# Patient Record
Sex: FEMALE | Race: WHITE | Employment: OTHER | URBAN - METROPOLITAN AREA
[De-identification: names, ages, dates, MRNs, and addresses within clinical notes are randomized per-mention and may not be internally consistent; named-entity substitution may affect disease eponyms.]

---

## 2018-03-07 RX ORDER — SCOLOPAMINE TRANSDERMAL SYSTEM 1 MG/1
1 PATCH, EXTENDED RELEASE TRANSDERMAL
COMMUNITY
End: 2021-03-15 | Stop reason: ALTCHOICE

## 2018-03-07 RX ORDER — CLINDAMYCIN AND BENZOYL PEROXIDE 10; 50 MG/G; MG/G
GEL TOPICAL NIGHTLY
COMMUNITY

## 2018-03-07 RX ORDER — GLIMEPIRIDE 4 MG/1
4 TABLET ORAL
COMMUNITY
End: 2022-03-07

## 2018-03-07 RX ORDER — SIMETHICONE 125 MG
1 CAPSULE ORAL 3 TIMES DAILY
COMMUNITY

## 2018-03-07 RX ORDER — TRETINOIN 1 MG/G
GEL TOPICAL NIGHTLY
COMMUNITY

## 2018-03-07 RX ORDER — M-VIT,TX,IRON,MINS/CALC/FOLIC 27MG-0.4MG
1 TABLET ORAL DAILY
COMMUNITY
End: 2020-12-02

## 2018-03-07 RX ORDER — ALPRAZOLAM 0.5 MG/1
0.5 TABLET ORAL EVERY 6 HOURS PRN
COMMUNITY
End: 2022-09-12

## 2018-03-07 RX ORDER — TACROLIMUS 0.5 MG/1
0.5 CAPSULE ORAL 2 TIMES DAILY
COMMUNITY

## 2018-03-07 RX ORDER — PRAVASTATIN SODIUM 10 MG
10 TABLET ORAL NIGHTLY
COMMUNITY

## 2018-03-07 RX ORDER — NYSTATIN 100000 U/G
OINTMENT TOPICAL 2 TIMES DAILY
COMMUNITY

## 2018-03-07 RX ORDER — DIPHENHYDRAMINE HCL 25 MG
25 CAPSULE ORAL NIGHTLY PRN
COMMUNITY

## 2018-03-07 RX ORDER — ESCITALOPRAM OXALATE 20 MG/1
20 TABLET ORAL DAILY
COMMUNITY

## 2018-03-07 RX ORDER — OMEPRAZOLE 20 MG/1
20 CAPSULE, DELAYED RELEASE ORAL DAILY
COMMUNITY

## 2018-03-07 RX ORDER — MYCOPHENOLATE MOFETIL 500 MG/1
1000 TABLET ORAL 2 TIMES DAILY
COMMUNITY
End: 2021-12-08

## 2018-03-07 RX ORDER — DOXYCYCLINE HYCLATE 100 MG
100 TABLET ORAL DAILY PRN
COMMUNITY

## 2018-03-07 RX ORDER — LISINOPRIL 5 MG/1
5 TABLET ORAL DAILY
COMMUNITY
End: 2022-09-12

## 2018-03-08 ENCOUNTER — HOSPITAL ENCOUNTER (OUTPATIENT)
Dept: RADIATION ONCOLOGY | Age: 63
Discharge: HOME | End: 2018-03-08
Payer: COMMERCIAL

## 2018-03-08 VITALS
HEART RATE: 100 BPM | DIASTOLIC BLOOD PRESSURE: 50 MMHG | BODY MASS INDEX: 31.5 KG/M2 | RESPIRATION RATE: 14 BRPM | WEIGHT: 220 LBS | HEIGHT: 70 IN | SYSTOLIC BLOOD PRESSURE: 119 MMHG | OXYGEN SATURATION: 95 % | TEMPERATURE: 96.6 F

## 2018-03-08 DIAGNOSIS — G47.33 OSA (OBSTRUCTIVE SLEEP APNEA): ICD-10-CM

## 2018-03-08 DIAGNOSIS — D05.12 DUCTAL CARCINOMA IN SITU (DCIS) OF LEFT BREAST: ICD-10-CM

## 2018-03-08 DIAGNOSIS — E11.9 TYPE 2 DIABETES MELLITUS WITHOUT COMPLICATION, UNSPECIFIED LONG TERM INSULIN USE STATUS: ICD-10-CM

## 2018-03-08 DIAGNOSIS — K21.9 GASTROESOPHAGEAL REFLUX DISEASE WITHOUT ESOPHAGITIS: ICD-10-CM

## 2018-03-08 DIAGNOSIS — E78.00 PURE HYPERCHOLESTEROLEMIA: ICD-10-CM

## 2018-03-08 DIAGNOSIS — I10 ESSENTIAL HYPERTENSION: ICD-10-CM

## 2018-03-08 DIAGNOSIS — Z94.1 H/O HEART TRANSPLANT (HCC): ICD-10-CM

## 2018-03-08 PROCEDURE — 99212 OFFICE O/P EST SF 10 MIN: CPT | Performed by: RADIOLOGY

## 2018-03-08 NOTE — H&P
RADIATION NEW PATIENT EVALUATION  DATE OF VISIT: 3/8/2018    DIAGNOSIS & STAGING: Low grad DCIS L breast 0.9 cm negative margins ER 90%      Dear Dr. Tatiana Alonso and Gucci Fowler,     CURRENT COMPLAINT: Here today for adjuvant radiation recommendations. HPI: I was asked by Dr. Tatiana Alonso to see this 58 y.o. postmenopausal female who has been followed closely with mammography +/- US every 6 months of the L breast for an abnormality thought to be probably benign since 2015. In November 2016 there was a 0.3 x 0.5 x 0.5 oval mass in the left breast seen and biopsy was recommended, but the patient declined. On 6/13/18 repeat mammography once again demonstrated likely benign findings and short term follow up was once again recommended, however on 11/28/18 the left breast mass was slightly larger (0.6cm) and biopsy was recommended. On 11/28/17 left breast biopsy at 12 o'clock 9 cm from the nipple demonstrated intraductal papilloma with focal ADH. Left breast lumpectomy on 1/20/18 at [facility] by Dr Gucci Fowler at [place] revealed a low grade solid and cribiform DCIS arising from intraductal papilloma with close margins. 0.9 cm ER 90%. The area underwent reexcision on 2/19/18 and only fat necrosis and fibrocystic changes with florid ductal hyperplasia was seen. The patient saw Dr Tatiana Alonso and discussed aromatase inhibitor therapy. Per Dr Catie Palma note, the patient researched the drug and declined therapy, however the patient states that Dr Tatiana Alonso did not recommend therapy as the relative benefit was small. She is here today to discuss adjuvant radiotherapy. She had a cardiac transplant in 2004 for dilated cardiomyopathy and mitral valve insufficiency. She is doing well from a cardiac standpoint, and has a catheterization scheduled in the near future. She complains of mild tenderness in the surgical bed.      PAST MEDICAL HISTORY:   Past Medical History:   Diagnosis Date    Depressive disorder     Ductal carcinoma in situ (DCIS) of left breast No cyanosis clubbing or lymphedema    MUSCULOSKELETAL: No bony tenderness along the hips ribs or spine. Motor strength is 5 out of 5 all extremities bilaterally. Tone is normal.    NEUROLOGIC:  Awake, alert, oriented x 3. Nonfocal    SKIN:  no bruising or bleeding, normal skin color, texture, turgor, no redness, warmth, or swelling, no rashes, no lesions, no abnormal moles and no jaundice      STUDIES: I have personally reviewed the imaging, laboratory, and pathology studies as previously described. IMPRESSION/PLAN:    0.9 cm low grade DCIS arising in papilloma. L breast, negative margins. ER 90%. History of cardiac transplant . [place]ed [facility] [place]:  Surgery alone 5y RR 8%, 10y RR 13%, with radiation 3%, 5%,  With AI  4%, 6%, AI and radiation 2% 2%. Recommend Oncotype DCIS score and further discussions regarding relative benefit of adjuvant therapy. Intent of treatment, potential risks benefits and side effects reviewed today. FOLLOW UP:  April 9 to discuss Oncotype DCIS results    ORDERS:  Through Dr Lillie Thornton office- Oncotype DCIS    Thank you for this consult and allowing us to participate in the care of this patient.   Sincerely,    Electronically signed by Charlotte Shelley MD on 3/8/18 at 2:43 PM      cc:   No att. providers found  MD Sandra Murillo MD  [facility], [address] [place] [address]

## 2018-04-09 ENCOUNTER — HOSPITAL ENCOUNTER (OUTPATIENT)
Dept: RADIATION ONCOLOGY | Age: 63
Discharge: HOME | End: 2018-04-09
Payer: COMMERCIAL

## 2018-11-12 ENCOUNTER — HOSPITAL ENCOUNTER (OUTPATIENT)
Dept: RADIATION ONCOLOGY | Age: 63
Discharge: HOME | End: 2018-11-12
Payer: COMMERCIAL

## 2018-11-12 VITALS
SYSTOLIC BLOOD PRESSURE: 142 MMHG | WEIGHT: 221.6 LBS | TEMPERATURE: 97.6 F | BODY MASS INDEX: 31.8 KG/M2 | RESPIRATION RATE: 16 BRPM | DIASTOLIC BLOOD PRESSURE: 97 MMHG | HEART RATE: 106 BPM

## 2018-11-12 DIAGNOSIS — D05.12 DUCTAL CARCINOMA IN SITU (DCIS) OF LEFT BREAST: Primary | ICD-10-CM

## 2018-11-12 PROCEDURE — 99214 OFFICE O/P EST MOD 30 MIN: CPT | Performed by: RADIOLOGY

## 2018-11-12 NOTE — ONCOLOGY
comparison. We will see her back at that time. Electronically signed by Juancho Davis MD on 11/12/18 at 4:58 PM      Thank you for allowing us to participate in the care of this patient.   cc:   No att. providers found  MD Lorena Gabriel MD  [address] [place] [address], [address] [place] [address]

## 2019-01-31 ENCOUNTER — HOSPITAL ENCOUNTER (OUTPATIENT)
Dept: PHYSICAL THERAPY | Age: 64
Setting detail: THERAPIES SERIES
Discharge: HOME | End: 2019-01-31
Payer: COMMERCIAL

## 2019-01-31 PROCEDURE — 97162 PT EVAL MOD COMPLEX 30 MIN: CPT

## 2019-01-31 ASSESSMENT — PAIN DESCRIPTION - LOCATION: LOCATION: KNEE

## 2019-01-31 ASSESSMENT — PAIN DESCRIPTION - FREQUENCY: FREQUENCY: INTERMITTENT

## 2019-01-31 ASSESSMENT — PAIN - FUNCTIONAL ASSESSMENT: PAIN_FUNCTIONAL_ASSESSMENT: PREVENTS OR INTERFERES WITH MANY ACTIVE NOT PASSIVE ACTIVITIES

## 2019-01-31 ASSESSMENT — PAIN DESCRIPTION - PAIN TYPE: TYPE: CHRONIC PAIN

## 2019-01-31 ASSESSMENT — PAIN DESCRIPTION - PROGRESSION: CLINICAL_PROGRESSION: GRADUALLY WORSENING

## 2019-01-31 ASSESSMENT — PAIN DESCRIPTION - DESCRIPTORS: DESCRIPTORS: DULL;PRESSURE

## 2019-01-31 ASSESSMENT — PAIN DESCRIPTION - ONSET: ONSET: PROGRESSIVE

## 2019-01-31 ASSESSMENT — PAIN DESCRIPTION - ORIENTATION: ORIENTATION: RIGHT;LEFT

## 2019-01-31 ASSESSMENT — PAIN SCALES - GENERAL: PAINLEVEL_OUTOF10: 2

## 2019-02-06 ENCOUNTER — HOSPITAL ENCOUNTER (OUTPATIENT)
Dept: PHYSICAL THERAPY | Age: 64
Setting detail: THERAPIES SERIES
Discharge: HOME | End: 2019-02-06
Payer: COMMERCIAL

## 2019-02-06 PROCEDURE — 97110 THERAPEUTIC EXERCISES: CPT

## 2019-02-06 ASSESSMENT — PAIN DESCRIPTION - LOCATION: LOCATION: KNEE

## 2019-02-06 ASSESSMENT — PAIN DESCRIPTION - PAIN TYPE: TYPE: CHRONIC PAIN

## 2019-02-06 ASSESSMENT — PAIN SCALES - GENERAL: PAINLEVEL_OUTOF10: 0

## 2019-02-06 ASSESSMENT — PAIN DESCRIPTION - PROGRESSION: CLINICAL_PROGRESSION: GRADUALLY WORSENING

## 2019-02-06 ASSESSMENT — PAIN DESCRIPTION - FREQUENCY: FREQUENCY: INTERMITTENT

## 2019-02-06 ASSESSMENT — PAIN DESCRIPTION - ORIENTATION: ORIENTATION: RIGHT;LEFT

## 2019-02-08 ENCOUNTER — HOSPITAL ENCOUNTER (OUTPATIENT)
Dept: PHYSICAL THERAPY | Age: 64
Setting detail: THERAPIES SERIES
Discharge: HOME | End: 2019-02-08
Payer: COMMERCIAL

## 2019-02-08 PROCEDURE — 97110 THERAPEUTIC EXERCISES: CPT

## 2019-02-08 ASSESSMENT — PAIN DESCRIPTION - FREQUENCY: FREQUENCY: INTERMITTENT

## 2019-02-08 ASSESSMENT — PAIN DESCRIPTION - ORIENTATION: ORIENTATION: RIGHT;LEFT

## 2019-02-08 ASSESSMENT — PAIN SCALES - GENERAL: PAINLEVEL_OUTOF10: 4

## 2019-02-08 ASSESSMENT — PAIN DESCRIPTION - LOCATION: LOCATION: KNEE

## 2019-02-08 ASSESSMENT — PAIN DESCRIPTION - DESCRIPTORS: DESCRIPTORS: DULL;PRESSURE

## 2019-02-08 ASSESSMENT — PAIN DESCRIPTION - PAIN TYPE: TYPE: CHRONIC PAIN

## 2019-02-08 ASSESSMENT — PAIN DESCRIPTION - PROGRESSION: CLINICAL_PROGRESSION: GRADUALLY WORSENING

## 2019-02-13 ENCOUNTER — HOSPITAL ENCOUNTER (OUTPATIENT)
Dept: PHYSICAL THERAPY | Age: 64
Setting detail: THERAPIES SERIES
Discharge: HOME | End: 2019-02-13
Payer: COMMERCIAL

## 2019-02-13 PROCEDURE — 97113 AQUATIC THERAPY/EXERCISES: CPT

## 2019-02-13 ASSESSMENT — PAIN DESCRIPTION - FREQUENCY: FREQUENCY: INTERMITTENT

## 2019-02-13 ASSESSMENT — PAIN DESCRIPTION - PROGRESSION: CLINICAL_PROGRESSION: GRADUALLY WORSENING

## 2019-02-13 ASSESSMENT — PAIN SCALES - GENERAL: PAINLEVEL_OUTOF10: 7

## 2019-02-13 ASSESSMENT — PAIN DESCRIPTION - LOCATION: LOCATION: KNEE

## 2019-02-13 ASSESSMENT — PAIN DESCRIPTION - DESCRIPTORS: DESCRIPTORS: ACHING

## 2019-02-13 ASSESSMENT — PAIN DESCRIPTION - ORIENTATION: ORIENTATION: LEFT;RIGHT

## 2019-02-15 ENCOUNTER — HOSPITAL ENCOUNTER (OUTPATIENT)
Dept: PHYSICAL THERAPY | Age: 64
Setting detail: THERAPIES SERIES
Discharge: HOME | End: 2019-02-15
Payer: COMMERCIAL

## 2019-02-20 ENCOUNTER — HOSPITAL ENCOUNTER (OUTPATIENT)
Dept: PHYSICAL THERAPY | Age: 64
Setting detail: THERAPIES SERIES
Discharge: HOME | End: 2019-02-20
Payer: COMMERCIAL

## 2019-02-22 ENCOUNTER — HOSPITAL ENCOUNTER (OUTPATIENT)
Dept: PHYSICAL THERAPY | Age: 64
Setting detail: THERAPIES SERIES
Discharge: HOME | End: 2019-02-22
Payer: COMMERCIAL

## 2019-02-22 PROCEDURE — 97110 THERAPEUTIC EXERCISES: CPT

## 2019-02-22 ASSESSMENT — PAIN SCALES - GENERAL: PAINLEVEL_OUTOF10: 6

## 2019-02-22 ASSESSMENT — PAIN DESCRIPTION - PAIN TYPE: TYPE: CHRONIC PAIN

## 2019-02-27 ENCOUNTER — HOSPITAL ENCOUNTER (OUTPATIENT)
Dept: PHYSICAL THERAPY | Age: 64
Setting detail: THERAPIES SERIES
Discharge: HOME | End: 2019-02-27
Payer: COMMERCIAL

## 2019-03-01 ENCOUNTER — HOSPITAL ENCOUNTER (OUTPATIENT)
Dept: PHYSICAL THERAPY | Age: 64
Setting detail: THERAPIES SERIES
Discharge: HOME | End: 2019-03-01
Payer: COMMERCIAL

## 2019-03-01 PROCEDURE — 97110 THERAPEUTIC EXERCISES: CPT

## 2019-03-01 ASSESSMENT — PAIN DESCRIPTION - PAIN TYPE: TYPE: CHRONIC PAIN

## 2019-03-01 ASSESSMENT — PAIN DESCRIPTION - FREQUENCY: FREQUENCY: INTERMITTENT

## 2019-03-01 ASSESSMENT — PAIN DESCRIPTION - ORIENTATION: ORIENTATION: LEFT;RIGHT

## 2019-03-01 ASSESSMENT — PAIN DESCRIPTION - DESCRIPTORS: DESCRIPTORS: ACHING

## 2019-03-01 ASSESSMENT — PAIN SCALES - GENERAL: PAINLEVEL_OUTOF10: 7

## 2019-03-01 ASSESSMENT — PAIN DESCRIPTION - LOCATION: LOCATION: OTHER (COMMENT)

## 2019-05-13 ENCOUNTER — HOSPITAL ENCOUNTER (OUTPATIENT)
Dept: ULTRASOUND IMAGING | Age: 64
Discharge: HOME | End: 2019-05-15
Payer: COMMERCIAL

## 2019-05-13 ENCOUNTER — HOSPITAL ENCOUNTER (OUTPATIENT)
Dept: WOMENS IMAGING | Age: 64
Discharge: HOME | End: 2019-05-15
Payer: COMMERCIAL

## 2019-05-13 DIAGNOSIS — Z85.3 HISTORY OF BREAST CANCER: ICD-10-CM

## 2019-05-13 PROCEDURE — G0279 TOMOSYNTHESIS, MAMMO: HCPCS

## 2019-05-13 PROCEDURE — 76642 ULTRASOUND BREAST LIMITED: CPT

## 2019-05-17 ENCOUNTER — HOSPITAL ENCOUNTER (OUTPATIENT)
Dept: RADIATION ONCOLOGY | Age: 64
Discharge: HOME | End: 2019-05-17
Payer: COMMERCIAL

## 2019-05-17 VITALS
SYSTOLIC BLOOD PRESSURE: 115 MMHG | WEIGHT: 222.2 LBS | DIASTOLIC BLOOD PRESSURE: 70 MMHG | HEIGHT: 70 IN | RESPIRATION RATE: 16 BRPM | TEMPERATURE: 96.6 F | HEART RATE: 96 BPM | BODY MASS INDEX: 31.81 KG/M2

## 2019-05-17 DIAGNOSIS — D05.12 DUCTAL CARCINOMA IN SITU (DCIS) OF LEFT BREAST: Primary | ICD-10-CM

## 2019-05-17 PROCEDURE — 99214 OFFICE O/P EST MOD 30 MIN: CPT | Performed by: RADIOLOGY

## 2019-05-17 RX ORDER — GABAPENTIN 300 MG/1
300 CAPSULE ORAL 2 TIMES DAILY
COMMUNITY
End: 2020-12-02

## 2019-05-17 NOTE — ONCOLOGY
History     Tobacco Use   Smoking Status Former Smoker    Packs/day: 10.00    Last attempt to quit: 1986    Years since quittin.3   Smokeless Tobacco Never Used     Social History     Substance and Sexual Activity   Alcohol Use No       ALLERGIES:   Allergies   Allergen Reactions    Iodides     Metformin      Other reaction(s): GI Upset    Metoclopramide      Other reaction(s): Intolerance  Teeth grinding    Nsaids      Other reaction(s): Other: See Comments  S/p heart transplant; decreased renal function    Procaine Hives        CURRENT MEDICATIONS:     Prior to Admission medications    Medication Sig Start Date End Date Taking? Authorizing Provider   gabapentin (NEURONTIN) 300 MG capsule Take 300 mg by mouth 2 times daily. Yes Historical Provider, MD   aspirin 81 MG tablet Take 81 mg by mouth daily   Yes Historical Provider, MD   ALPRAZolam (XANAX) 0.5 MG tablet Take 0.5 mg by mouth every 6 hours as needed for Sleep. Yes Historical Provider, MD   mycophenolate (CELLCEPT) 500 MG tablet Take 1,000 mg by mouth 2 times daily   Yes Historical Provider, MD   tacrolimus (PROGRAF) 0.5 MG capsule Take 0.5 mg by mouth 2 times daily   Yes Historical Provider, MD   insulin glargine (LANTUS SOLOSTAR) 100 UNIT/ML injection pen Inject 3 Units into the skin nightly   Yes Historical Provider, MD   omeprazole (PRILOSEC) 20 MG delayed release capsule Take 20 mg by mouth daily   Yes Historical Provider, MD   Multiple Vitamins-Minerals (OCUVITE-LUTEIN PO) Take 1 tablet by mouth daily   Yes Historical Provider, MD   glimepiride (AMARYL) 4 MG tablet Take 4 mg by mouth every morning (before breakfast)   Yes Historical Provider, MD   clindamycin-benzoyl peroxide (BENZACLIN) 1-5 % gel Apply topically nightly Apply topically 2 times daily.    Yes Historical Provider, MD   pravastatin (PRAVACHOL) 10 MG tablet Take 10 mg by mouth nightly   Yes Historical Provider, MD   lisinopril (PRINIVIL;ZESTRIL) 5 MG tablet Take 5 mg by mouth daily   Yes Historical Provider, MD   SITagliptin (JANUVIA) 100 MG tablet Take 100 mg by mouth daily   Yes Historical Provider, MD   escitalopram (LEXAPRO) 20 MG tablet Take 20 mg by mouth daily   Yes Historical Provider, MD   vitamin D (CHOLECALCIFEROL) 1000 UNIT TABS tablet Take 1,000 Units by mouth 2 times daily   Yes Historical Provider, MD   doxycycline hyclate (VIBRA-TABS) 100 MG tablet Take 100 mg by mouth daily    Historical Provider, MD   diphenhydrAMINE (BENADRYL) 25 MG capsule Take 25 mg by mouth nightly as needed for Itching (2-4 tabs at bedtime prn)    Historical Provider, MD   nystatin (MYCOSTATIN) 705609 UNIT/GM ointment Apply topically 2 times daily Apply topically 2 times daily. Historical Provider, MD   Magnesium 400 MG CAPS Take 1 tablet by mouth daily    Historical Provider, MD   tretinoin microspheres (RETIN-A MICRO) 0.1 % gel Apply topically nightly Apply topically nightly. Historical Provider, MD   scopolamine (TRANSDERM-SCOP, 1.5 MG,) transdermal patch Place 1 patch onto the skin every 72 hours    Historical Provider, MD   Multiple Vitamins-Minerals (THERAPEUTIC MULTIVITAMIN-MINERALS) tablet Take 1 tablet by mouth daily    Historical Provider, MD   Simethicone (GAS-X EXTRA STRENGTH) 125 MG CAPS Take 1 capsule by mouth 3 times daily    Historical Provider, MD     ECOG PERFORMANCE STATUS:    VITAL SIGNS:    Vitals:    05/17/19 0947   BP: 115/70   Pulse: 96   Resp: 16   Temp: 96.6 °F (35.9 °C)   Weight: 222 lb 3.2 oz (100.8 kg)   Height: 5' 10\" (1.778 m)     PHYSICAL EXAMINATION:  GENERAL: No acute distress. Alert, oriented, cooperative. Seen with  present. HEENT:  PERRLA, EOMI. Oral cavity WNL. NECK:  No palpable cervical or supraclavicular adenopathy. CHEST/LUNGS: CTA  CARDIOVASCULAR:  RRR, no audible murmur. Well-healed sternotomy scar from heart transplant. ABDOMEN:  Benign  EXTREMITIES: No C/C/E  BREASTS: Examined sitting and supine.   Good cosmetic outcome, with a solitary left upper inner quadrant scar. no palpable seroma. Breast nontender. Left nipple inverted which the patient states has always been the case. No palpable breast nodule, and in particular in the lower inner quadrant I could appreciate nothing suspicious. No axillary , infraclavicular, or supraclavicular adenopathy. Right breast entirely within normal limits. STUDIES: Mammogram L, & US. Compared with priors from [facility], now imported into [employer]. MILTON at site of DCIS. New 5mm nodule LIQ. Considered low risk, f/u 6m recommended. IMPRESSION/PLAN:  No evidence of recurrence at the lumpectomy site, with a new 5 mm density in the lower inner quadrant. Uncertain significance, considered low risk by radiology with a 6 month mammogram recommended. I ordered a bilateral mammogram to be done 6 months and I will see the patient back at that time. Electronically signed by Sameer Alvares MD on 5/17/19 at 11:22 AM      Thank you for allowing us to participate in the care of this patient.   cc:   No att. providers found  MD Magda Castañeda MD  [facility], [address] [place] [address]

## 2019-11-15 ENCOUNTER — HOSPITAL ENCOUNTER (OUTPATIENT)
Dept: WOMENS IMAGING | Age: 64
Discharge: HOME | End: 2019-11-17
Payer: COMMERCIAL

## 2019-11-15 DIAGNOSIS — Z85.3 HX: BREAST CANCER: ICD-10-CM

## 2019-11-15 PROCEDURE — G0279 TOMOSYNTHESIS, MAMMO: HCPCS

## 2019-11-20 ENCOUNTER — HOSPITAL ENCOUNTER (OUTPATIENT)
Dept: RADIATION ONCOLOGY | Age: 64
Discharge: HOME | End: 2019-11-20
Payer: COMMERCIAL

## 2019-11-20 VITALS
TEMPERATURE: 97.5 F | WEIGHT: 231 LBS | OXYGEN SATURATION: 98 % | DIASTOLIC BLOOD PRESSURE: 83 MMHG | BODY MASS INDEX: 33.15 KG/M2 | RESPIRATION RATE: 16 BRPM | SYSTOLIC BLOOD PRESSURE: 136 MMHG | HEART RATE: 89 BPM

## 2019-11-20 DIAGNOSIS — D05.12 DUCTAL CARCINOMA IN SITU (DCIS) OF LEFT BREAST: Primary | ICD-10-CM

## 2019-11-20 PROCEDURE — 99211 OFF/OP EST MAY X REQ PHY/QHP: CPT | Performed by: RADIOLOGY

## 2020-05-21 ENCOUNTER — HOSPITAL ENCOUNTER (OUTPATIENT)
Dept: WOMENS IMAGING | Age: 65
Discharge: HOME | End: 2020-05-23
Payer: COMMERCIAL

## 2020-05-21 PROCEDURE — G0279 TOMOSYNTHESIS, MAMMO: HCPCS

## 2020-05-22 ENCOUNTER — HOSPITAL ENCOUNTER (OUTPATIENT)
Dept: RADIATION ONCOLOGY | Age: 65
Discharge: HOME | End: 2020-05-22
Payer: COMMERCIAL

## 2020-05-22 VITALS
WEIGHT: 230.2 LBS | BODY MASS INDEX: 33.03 KG/M2 | SYSTOLIC BLOOD PRESSURE: 136 MMHG | HEART RATE: 89 BPM | DIASTOLIC BLOOD PRESSURE: 87 MMHG | RESPIRATION RATE: 16 BRPM | TEMPERATURE: 97.3 F

## 2020-05-22 PROCEDURE — 99212 OFFICE O/P EST SF 10 MIN: CPT | Performed by: RADIOLOGY

## 2020-05-22 NOTE — ONCOLOGY
NURSING ASSESSMENT     Date: 5/22/2020        Patient Name: Sofie Rojas     YOB: 1955      Age:  59 y.o. MRN: [id number]     Chaperone [] Yes   [x] No        Pain Score:   Pain Score (1-10): 8  Pain Location: lt breast  Near nipple  Pain Duration: comes and goes  Pain Management/Control:none      Is pain affecting your ability to take care of yourself or move throughout your home? [] Yes   [x] No    General: Fatigue  Patient has gained weight [] Yes   [x] No  Patient has lost weight [] Yes   [x] No  How much weight in pounds and over what length of time:     Eyes (Ophthalmic): No Problem- wearing glasses more     Skin (Dermatological): needs to see dermatologist- has itchy scapulas,rt arm and legs  Had rash but improved     ENT: No Problems     Respiratory: SOB and ALONZO     Cardiovascular: hx of transplant 2004. Some palpitations at time        Gastrointestinal: Nausea- every am-  This is new    Genito-Urinary: No Problems     Breast: Changes- lt ca. occ breast itches on lt  Pain in the lt breast  Has had a long time     Musculoskeletal: Joint Pain lower half of body    Neurological: Numbness and Tingling- feet. Hematological and Lymphatic: Prior Transfusion- during surgery     Endocrine: Diabetes Mellitus        Was the patient admitted during the course of treatment OR within 30 days of treatment?  NO      Additional Comments: Dr Nicol Cuello dismissed the pt

## 2020-05-22 NOTE — H&P
RADIATION FOLLOW UP NOTE  DATE OF VISIT: 5/22/2020    DIAGNOSIS & STAGING: Low grade DCIS L breast 0.9 cm negative margins ER 90%     Dear Evin Felix DO,     CURRENT COMPLAINT: Routine followup     INTERVAL HISTORY: Cody Branch is a  postmenopausal female who has been followed closely with mammography +/- US every 6 months of the L breast for an abnormality thought to be probably benign since 2015. In November 2016 there was a 0.3 x 0.5 x 0.5 oval mass in the left breast seen and biopsy was recommended, but the patient declined.  On 6/13/18 repeat mammography once again demonstrated likely benign findings and short term follow up was once again recommended, however on 11/28/18 the left breast mass was slightly larger (0.6cm) and biopsy was recommended. On 11/28/17 left breast biopsy at 12 o'clock 9 cm from the nipple demonstrated intraductal papilloma with focal ADH. Left breast lumpectomy on 1/20/18 at [facility] by Dr Ino Dougherty at [place] revealed a low grade solid and cribiform DCIS arising from intraductal papilloma with close margins. 0.9 cm ER 90%. The area underwent reexcision on 2/19/18 and only fat necrosis and fibrocystic changes with florid ductal hyperplasia was seen.      The patient saw Dr Erika Franco and discussed aromatase inhibitor therapy, and the patient states that Dr Erika Franco did not recommend therapy as the relative benefit was small. She no longer sees Dr Erika Franco. She was seen 3/8/18 to discuss adjuvant radiotherapy recommendations. She had a cardiac transplant in 2004 for dilated cardiomyopathy and mitral valve insufficiency. Oncotype DCIS score was ordered to help with radiation decision making. The score  returned as 0 (ER 10.7 MO >10) corresponding with 5% any breast recurrence risk (ICD and DCIS) and 4% IDC risk fiollowing surgery alone (without radiation). I discussed the report and the significance with the patient and her  and she decided for close followup and forego the radiation.      Since Spring 2018 she has been under close surveillence with self breast exams and q 6 month left mammography. She has transferred her mammograms to  [facility] and last bilateral mammogram was 11/15/19. COMPARISON: May 13, 2019, October 11, 2018, and November 28, 2017.       FINDINGS:3-D tomosynthesis imaging of the bilateral breasts was performed. Stable postop changes in the upper central left breast. Benign coarse calcification in the deep left breast. There are scattered fibroglandular densities within each breast.      There are no suspicious masses, areas of architectural distortion or suspicious areas of microcalcifications.         CAD analysis was performed and used in the interpretation.           Impression   BI-RADS 2: BENIGN FINDINGS.        Most recent mammogram 5/21/20:  EXAMINATION: BEE DIGITAL DIAGNOSTIC W OR WO CAD LEFT       CLINICAL HISTORY:D05.12 Ductal carcinoma in situ (DCIS) of left breast ICD10        COMPARISON: Priors dating back to 2017.       RESULT:       3-D tomosynthesis imaging of the left breast was performed.        There are scattered fibroglandular densities.          There are no suspicious masses or asymmetries, areas of architectural distortion or suspicious areas of microcalcifications. Posttreatment changes with areas of fat necrosis are unchanged. Stable asymmetries.         CAD analysis was performed and used in the interpretation.               Impression       BI-RADS 2: BENIGN FINDINGS.       ROUTINE FOLLOW-UP MAMMOGRAPHY IS SUGGESTED WHEN DUE FOR ANNUAL SCREENING.       DENSITY: Scattered        Board Certified Radiologists.  Accredited by the ACR and FDA. She is here for routine followup. She complains of persistent left breast tenderness to deep palpation.      PAST MEDICAL HISTORY:   Past Medical History:   Diagnosis Date    Depressive disorder     Ductal carcinoma in situ (DCIS) of left breast 01/30/2018    [id number]   2 cm low grade negative margins ER strongly pos.  s/p partial mastectomy    Esophageal reflux     H/O heart transplant ([facility] [address] 2004    for dilated cardiomyopathy     HPV (human papilloma virus) anogenital infection     Hyperlipidemia     Hypertension     Kidney stones     Lumbago     Multiple nevi     KARMEN (obstructive sleep apnea)     Osteoarthritis     [facility]        PAST SURGICAL HISTORY:  Past Surgical History:   Procedure Laterality Date    BREAST LUMPECTOMY      CATARACT REMOVAL Bilateral     CHOLECYSTECTOMY  2011    HEART TRANSPLANT  2004       ALLERGIES:   Allergies   Allergen Reactions    Iodides     Metformin      Other reaction(s): GI Upset    Metoclopramide      Other reaction(s): Intolerance  Teeth grinding    Nsaids      Other reaction(s): Other: See Comments  S/p heart transplant; decreased renal function    Procaine Hives        I have personally reviewed and reconciled the allergies listed. CURRENT MEDICATIONS:   Prior to Admission medications    Medication Sig Start Date End Date Taking? Authorizing Provider   insulin lispro (HUMALOG) 100 UNIT/ML injection vial Inject 200 Units into the skin 3 times daily (before meals) Indications: sliding scale   Yes Historical Provider, MD   gabapentin (NEURONTIN) 300 MG capsule Take 300 mg by mouth 2 times daily. Yes Historical Provider, MD   aspirin 81 MG tablet Take 81 mg by mouth daily   Yes Historical Provider, MD   ALPRAZolam (XANAX) 0.5 MG tablet Take 0.5 mg by mouth every 6 hours as needed for Sleep.    Yes Historical Provider, MD   mycophenolate (CELLCEPT) 500 MG tablet Take 1,000 mg by mouth 2 times daily   Yes Historical Provider, MD   tacrolimus (PROGRAF) 0.5 MG capsule Take 0.5 mg by mouth 2 times daily   Yes Historical Provider, MD   insulin glargine (LANTUS SOLOSTAR) 100 UNIT/ML injection pen Inject 3 Units into the skin nightly   Yes Historical Provider, MD   omeprazole (PRILOSEC) 20 MG delayed release capsule Take 20 mg by

## 2020-11-23 ENCOUNTER — HOSPITAL ENCOUNTER (OUTPATIENT)
Dept: WOMENS IMAGING | Age: 65
Discharge: HOME | End: 2020-11-25
Payer: MEDICARE

## 2020-11-23 ENCOUNTER — HOSPITAL ENCOUNTER (OUTPATIENT)
Dept: ULTRASOUND IMAGING | Age: 65
Discharge: HOME | End: 2020-11-25
Payer: MEDICARE

## 2020-11-23 PROCEDURE — G0279 TOMOSYNTHESIS, MAMMO: HCPCS

## 2020-11-23 PROCEDURE — 76642 ULTRASOUND BREAST LIMITED: CPT

## 2020-11-24 ENCOUNTER — TELEPHONE (OUTPATIENT)
Dept: SURGERY | Age: 65
End: 2020-11-24

## 2020-11-24 NOTE — TELEPHONE ENCOUNTER
I scheduled the patient for 12-2-20 at 1:15 PM with Dr. Reji Pyle. The patient has no questions at this time.

## 2020-12-02 ENCOUNTER — HOSPITAL ENCOUNTER (OUTPATIENT)
Dept: RADIATION ONCOLOGY | Age: 65
Discharge: HOME | End: 2020-12-02
Payer: MEDICARE

## 2020-12-02 ENCOUNTER — OFFICE VISIT (OUTPATIENT)
Dept: SURGERY | Age: 65
End: 2020-12-02
Payer: MEDICARE

## 2020-12-02 VITALS
SYSTOLIC BLOOD PRESSURE: 130 MMHG | TEMPERATURE: 97.7 F | RESPIRATION RATE: 18 BRPM | HEART RATE: 98 BPM | BODY MASS INDEX: 31.33 KG/M2 | DIASTOLIC BLOOD PRESSURE: 78 MMHG | WEIGHT: 223.8 LBS | HEIGHT: 71 IN

## 2020-12-02 VITALS
HEART RATE: 98 BPM | DIASTOLIC BLOOD PRESSURE: 78 MMHG | BODY MASS INDEX: 31.33 KG/M2 | RESPIRATION RATE: 18 BRPM | TEMPERATURE: 99.1 F | SYSTOLIC BLOOD PRESSURE: 130 MMHG | WEIGHT: 223.8 LBS | HEIGHT: 71 IN

## 2020-12-02 PROBLEM — D05.12 BREAST NEOPLASM, TIS (DCIS), LEFT: Status: ACTIVE | Noted: 2020-12-02

## 2020-12-02 PROBLEM — R92.8 ABNORMAL MAMMOGRAM OF LEFT BREAST: Status: ACTIVE | Noted: 2020-12-02

## 2020-12-02 PROCEDURE — 3017F COLORECTAL CA SCREEN DOC REV: CPT | Performed by: SURGERY

## 2020-12-02 PROCEDURE — 1123F ACP DISCUSS/DSCN MKR DOCD: CPT | Performed by: SURGERY

## 2020-12-02 PROCEDURE — G8417 CALC BMI ABV UP PARAM F/U: HCPCS | Performed by: SURGERY

## 2020-12-02 PROCEDURE — G8427 DOCREV CUR MEDS BY ELIG CLIN: HCPCS | Performed by: SURGERY

## 2020-12-02 PROCEDURE — 1036F TOBACCO NON-USER: CPT | Performed by: SURGERY

## 2020-12-02 PROCEDURE — 1090F PRES/ABSN URINE INCON ASSESS: CPT | Performed by: SURGERY

## 2020-12-02 PROCEDURE — 4040F PNEUMOC VAC/ADMIN/RCVD: CPT | Performed by: SURGERY

## 2020-12-02 PROCEDURE — G8400 PT W/DXA NO RESULTS DOC: HCPCS | Performed by: SURGERY

## 2020-12-02 PROCEDURE — 99204 OFFICE O/P NEW MOD 45 MIN: CPT | Performed by: SURGERY

## 2020-12-02 PROCEDURE — G8484 FLU IMMUNIZE NO ADMIN: HCPCS | Performed by: SURGERY

## 2020-12-02 RX ORDER — TRIAMCINOLONE ACETONIDE 1 MG/G
1 CREAM TOPICAL DAILY
COMMUNITY
Start: 2020-10-23 | End: 2021-12-08

## 2020-12-02 RX ORDER — BETAMETHASONE DIPROPIONATE 0.5 MG/G
1 CREAM TOPICAL 2 TIMES DAILY
COMMUNITY
Start: 2020-12-01

## 2020-12-02 ASSESSMENT — ENCOUNTER SYMPTOMS
SHORTNESS OF BREATH: 0
COUGH: 0
COLOR CHANGE: 0
ABDOMINAL PAIN: 0
VOMITING: 0
CHEST TIGHTNESS: 0
NAUSEA: 0
SORE THROAT: 0

## 2020-12-02 NOTE — PROGRESS NOTES
NEW BREAST PATIENT         SERVICE DATE: 12/2/20  SERVICE TIME:  1:28 PM EST    REFERRED BY:  Dr. Caty Vickers, Carina Houston DO  REASON FOR TODAY'S VISIT:    Chief Complaint   Patient presents with    New Patient     HX of Left Breast cancer 2018. Left Lumpectomy 2-9-2018 followed by radiation, declined endocrine therapy    Abnormal Mammogram     Left Breast BIRADS 4. Patient states the left breast is more painful than it was before      CHAPERONE WAS OFFERED, PATIENT RESPONDED: no    HISTORY AND CHIEF COMPLAINT:  Soy Zhong is a 72 y.o.  female who is here for a New Patient (HX of Left Breast cancer 2018. Left Lumpectomy 2-9-2018 followed by radiation, declined endocrine therapy) and Abnormal Mammogram (Left Breast BIRADS 4. Patient states the left breast is more painful than it was before)    Cancer Staging  Breast neoplasm, Tis (DCIS), left  Staging form: Breast, AJCC 8th Edition  - Clinical stage from 3/5/2018: Stage 0 (cTis (DCIS), cN0, cM0, ER+) - Signed by Yasmine Temple MD on 12/2/2020         BREAST HISTORY  Her past breast history (prior to this encounter) is as follows: Abnormal mammogram:   Yes, Left BIRADS 4  Abnormal Breast US:  Yes, Left Breast  Breast biopsy:    Yes, 2018 Left Breast  Breast cysts:    No  Breast surgery:    Yes, Left Breast Lumpectomy 2018 followed by reexcision of margins for DCIS on 2-9-2018,   Breast cancer              Yes, Left Breast DCIS, Oncotype DX score 0  History of Breastfeeding: No   Currently Breastfeeding: No      RISK FACTORS FOR BREAST CANCER:  Family History of Breast Cancer: Yes, significant for Paternal Grandmother age 80.   History of ovarian cancer: no  Ashkenazi Ancestry: no  Age at the birth of first child: n/a  Age at the onset of menses: 6  Age at menopause: 52's   Hormonal therapy: no  Postmenopausal obesity: yes, BMI 31.66    BRA SIZE: 42DDD    Past Medical History:   Diagnosis Date    Depressive disorder     Ductal carcinoma in situ (DCIS) of left breast 2018    pTispNxpM   2 cm low grade negative margins ER strongly pos.  s/p partial mastectomy    Esophageal reflux     H/O heart transplant ([facility] [address]     for dilated cardiomyopathy     HPV (human papilloma virus) anogenital infection     Hyperlipidemia     Hypertension     Kidney stones     Lumbago     Multiple nevi     KARMEN (obstructive sleep apnea)     Osteoarthritis     Type 2 diabetes mellitus without complication (HCC)      Past Surgical History:   Procedure Laterality Date    BREAST LUMPECTOMY      CATARACT REMOVAL Bilateral     CHOLECYSTECTOMY  2011    HEART TRANSPLANT       Family History   Problem Relation Age of Onset    Breast Cancer Paternal Grandmother     Cancer Paternal Grandmother      Social History     Socioeconomic History    Marital status:      Spouse name: Not on file    Number of children: Not on file    Years of education: Not on file    Highest education level: Not on file   Occupational History    Not on file   Social Needs    Financial resource strain: Not on file    Food insecurity     Worry: Not on file     Inability: Not on file    Transportation needs     Medical: Not on file     Non-medical: Not on file   Tobacco Use    Smoking status: Former Smoker     Packs/day: 10.00     Last attempt to quit: 1986     Years since quittin.9    Smokeless tobacco: Never Used   Substance and Sexual Activity    Alcohol use: No     Comment: occ    Drug use: Never    Sexual activity: Yes     Partners: Male   Lifestyle    Physical activity     Days per week: Not on file     Minutes per session: Not on file    Stress: Not on file   Relationships    Social connections     Talks on phone: Not on file     Gets together: Not on file     Attends Sikhism service: Not on file     Active member of club or organization: Not on file     Attends meetings of clubs or organizations: Not on file     Relationship status: Not on file   Dejon Stokes Intimate partner violence     Fear of current or ex partner: Not on file     Emotionally abused: Not on file     Physically abused: Not on file     Forced sexual activity: Not on file   Other Topics Concern    Not on file   Social History Narrative    Not on file       Review of Systems   Constitutional: Negative for activity change, appetite change, chills, diaphoresis, fatigue, fever and unexpected weight change. HENT: Negative for congestion, ear pain, hearing loss, mouth sores, nosebleeds and sore throat. Respiratory: Negative for cough, chest tightness and shortness of breath. Cardiovascular: Negative for chest pain, palpitations and leg swelling. Gastrointestinal: Negative for abdominal pain, nausea and vomiting. Endocrine: Negative for cold intolerance, heat intolerance, polydipsia, polyphagia and polyuria. Genitourinary: Negative for difficulty urinating, menstrual problem and vaginal bleeding. Musculoskeletal: Negative for neck pain and neck stiffness. Skin: Negative for color change, pallor, rash and wound. Allergic/Immunologic: Negative for environmental allergies and immunocompromised state. Neurological: Negative for weakness. Hematological: Does not bruise/bleed easily. Psychiatric/Behavioral: Negative for agitation, confusion, sleep disturbance and suicidal ideas. The patient is not nervous/anxious. Have you ever tested positive for AIDS? no  Have you ever tested positive for Hepatitis? no    ANTICOAGULANT MEDICATIONS:  ASA    SOCIAL HISTORY   Marital status:   Occupation:  Retired  Tobacco use: Mac Kelley  reports that she quit smoking about 34 years ago. She smoked 10.00 packs per day. She has never used smokeless tobacco.  Alcohol use: Mac Kelley  reports no history of alcohol use. Drug use: Mac Kelley  reports no history of drug use.   Caffeine intake: 2 cups of caffeinated coffee per day(s)  Exercise:  not active    /78   Pulse 98   Temp 99.1 °F (37.3 °C) Resp 18   Ht 5' 10.5\" (1.791 m)   Wt 223 lb 12.8 oz (101.5 kg)   BMI 31.66 kg/m²     Physical Exam  Vitals signs reviewed. Constitutional:       Appearance: Normal appearance. She is well-developed. HENT:      Head: Normocephalic and atraumatic. Nose: Nose normal.   Eyes:      Conjunctiva/sclera: Conjunctivae normal.      Right eye: No hemorrhage. Left eye: No hemorrhage. Neck:      Musculoskeletal: Normal range of motion and neck supple. Cardiovascular:      Rate and Rhythm: Normal rate. Pulmonary:      Effort: Pulmonary effort is normal. No respiratory distress. Chest:      Breasts: Breasts are symmetrical.         Right: No inverted nipple, mass, nipple discharge, skin change or tenderness. Left: No inverted nipple, mass, nipple discharge, skin change or tenderness. Abdominal:      Tenderness: There is no abdominal tenderness. Musculoskeletal: Normal range of motion. Comments: Normal Range of motion in upper and lower extremities. Lymphadenopathy:      Cervical: No cervical adenopathy. Right cervical: No superficial, deep or posterior cervical adenopathy. Left cervical: No superficial, deep or posterior cervical adenopathy. Upper Body:      Right upper body: No supraclavicular, axillary or pectoral adenopathy. Left upper body: No supraclavicular, axillary or pectoral adenopathy. Skin:     General: Skin is warm and dry. Findings: No abrasion, bruising, erythema or lesion. Neurological:      Mental Status: She is alert and oriented to person, place, and time. She is not disoriented. Psychiatric:         Speech: Speech normal.         Behavior: Behavior normal. Behavior is cooperative. Thought Content:  Thought content normal.         Judgment: Judgment normal.     RADIOGRAPHIC FINDINGS:    [facility] Digital Diagnostic W Or Wo Cad Bilateral    Result Date: 11/23/2020  [facility] DIGITAL DIAGNOSTIC W OR WO CAD BILATERAL, US BREAST LIMITED LEFT:11/23/2020 CLINICAL HISTORY:D05.12 Ductal carcinoma in situ (DCIS) of left breast ICD10. COMPARISONS:   11/15/2019, 10/11/2018, 11/28/2017. TECHNIQUE:  Full field routine digital mammograms were obtained bilaterally. 3D breast tomosynthesis was also performed. Directed ultrasound of the left breast was performed, with a regional survey. CAD analysis was performed and used in the interpretation. FINDINGS-    An approximately 1 cm partially obscured irregular density within the upper-outer quadrant of the left breast at a posterior depth is best visualized on the 3-D breast tomosynthesis images. Directed ultrasound demonstrates an approximately 6 x 5 mm irregular hypoechoic nodule at the 1:00 position approximately 7 cm from the nipple, which may correspond to the mammographic area of concern. However, biopsy using 3D breast tomosynthesis guidance is suggested. Scattered fibroglandular densities are noted with otherwise stable asymmetry and postoperative changes on the left. BI-RADS 4: SUSPICIOUS FINDING--BIOPSY SHOULD BE CONSIDERED. A 3D STEREOTACTIC LEFT BREAST BIOPSY IS SUGGESTED. DENSITY: Scattered fibroglandular densities. Board Certified Radiologists. Accredited by the ACR and FDA. MAMMOGRAPHY IS VERY IMPORTANT TO YOUR HEALTH. THE AMERICAN CANCER SOCIETY GUIDELINES RECOMMEND THAT WOMEN 36YEARS OF AGE AND OLDER SHOULD HAVE A MAMMOGRAM EVERY YEAR. A REMINDER LETTER WILL BE SENT AT THE APPROPRIATE TIME. Us Breast Limited Left    Result Date: 11/23/2020  [facility] DIGITAL DIAGNOSTIC W OR WO CAD BILATERAL, US BREAST LIMITED LEFT:11/23/2020 CLINICAL HISTORY:D05.12 Ductal carcinoma in situ (DCIS) of left breast ICD10. COMPARISONS:   11/15/2019, 10/11/2018, 11/28/2017. TECHNIQUE:  Full field routine digital mammograms were obtained bilaterally. 3D breast tomosynthesis was also performed. Directed ultrasound of the left breast was performed, with a regional survey.  CAD analysis was performed and used in the interpretation. FINDINGS-    An approximately 1 cm partially obscured irregular density within the upper-outer quadrant of the left breast at a posterior depth is best visualized on the 3-D breast tomosynthesis images. Directed ultrasound demonstrates an approximately 6 x 5 mm irregular hypoechoic nodule at the 1:00 position approximately 7 cm from the nipple, which may correspond to the mammographic area of concern. However, biopsy using 3D breast tomosynthesis guidance is suggested. Scattered fibroglandular densities are noted with otherwise stable asymmetry and postoperative changes on the left. BI-RADS 4: SUSPICIOUS FINDING--BIOPSY SHOULD BE CONSIDERED. A 3D STEREOTACTIC LEFT BREAST BIOPSY IS SUGGESTED. DENSITY: Scattered fibroglandular densities. Board Certified Radiologists. Accredited by the ACR and FDA. MAMMOGRAPHY IS VERY IMPORTANT TO YOUR HEALTH. THE AMERICAN CANCER SOCIETY GUIDELINES RECOMMEND THAT WOMEN 36YEARS OF AGE AND OLDER SHOULD HAVE A MAMMOGRAM EVERY YEAR. A REMINDER LETTER WILL BE SENT AT THE APPROPRIATE TIME. ASSESSMENT    IMPRESSION :      ICD-10-CM    1. Abnormal mammogram of left breast  R92.8    2. Breast neoplasm, Tis (DCIS), left  D05.12         PLAN:    F/u cardiologist regarding premedication for breast biopsy  I recommended a stereotactic core needle biopsy. I reviewed the procedure in great detail. I reviewed the risks and benefits of the procedure including bleeding and infection. I did inform the patient that if the procedure was not amendable to stereotactic biopsy, we will need to schedule an excisional biopsy. We will schedule the procedure. The patient had the opportunity to ask several questions and all were answered to their satisfaction. Total face to face time was 45 minutes with greater than 50% spent on counseling the patient or coordinating her care.      Cecilia Rogers MD    CC: Summer Jack,     The chief complaint, extensive medical and family history, and review of systems were asked and reviewed with the patient by me. I also reviewed the note in detail. This note was partially generated using Dragon voice recognition system, and there may be some incorrect words, spellings, punctuation that were not noticed in checking the note before saving.

## 2020-12-09 ENCOUNTER — HOSPITAL ENCOUNTER (OUTPATIENT)
Dept: WOMENS IMAGING | Age: 65
Discharge: HOME | End: 2020-12-11
Payer: MEDICARE

## 2020-12-09 VITALS — RESPIRATION RATE: 20 BRPM | SYSTOLIC BLOOD PRESSURE: 164 MMHG | DIASTOLIC BLOOD PRESSURE: 77 MMHG | HEART RATE: 109 BPM

## 2020-12-09 PROCEDURE — 88342 IMHCHEM/IMCYTCHM 1ST ANTB: CPT

## 2020-12-09 PROCEDURE — 2500000003 HC RX 250 WO HCPCS: Performed by: RADIOLOGY

## 2020-12-09 PROCEDURE — 2709999900 MAM STEREO BREAST BX W LOC DEVICE 1ST LESION LEFT

## 2020-12-09 PROCEDURE — 88305 TISSUE EXAM BY PATHOLOGIST: CPT

## 2020-12-09 PROCEDURE — 88341 IMHCHEM/IMCYTCHM EA ADD ANTB: CPT

## 2020-12-09 PROCEDURE — 2580000003 HC RX 258: Performed by: RADIOLOGY

## 2020-12-09 RX ORDER — LIDOCAINE HYDROCHLORIDE 20 MG/ML
20 INJECTION, SOLUTION INFILTRATION; PERINEURAL ONCE
Status: COMPLETED | OUTPATIENT
Start: 2020-12-09 | End: 2020-12-09

## 2020-12-09 RX ORDER — MAGNESIUM HYDROXIDE 1200 MG/15ML
250 LIQUID ORAL CONTINUOUS
Status: DISCONTINUED | OUTPATIENT
Start: 2020-12-09 | End: 2020-12-12 | Stop reason: HOSPADM

## 2020-12-09 RX ORDER — LIDOCAINE HYDROCHLORIDE AND EPINEPHRINE 10; 10 MG/ML; UG/ML
20 INJECTION, SOLUTION INFILTRATION; PERINEURAL ONCE
Status: COMPLETED | OUTPATIENT
Start: 2020-12-09 | End: 2020-12-09

## 2020-12-09 RX ADMIN — LIDOCAINE HYDROCHLORIDE AND EPINEPHRINE 20 ML: 10; 10 INJECTION, SOLUTION INFILTRATION; PERINEURAL at 11:30

## 2020-12-09 RX ADMIN — SODIUM CHLORIDE 250 ML: 900 IRRIGANT IRRIGATION at 11:30

## 2020-12-09 RX ADMIN — LIDOCAINE HYDROCHLORIDE 20 ML: 20 INJECTION, SOLUTION INFILTRATION; PERINEURAL at 11:30

## 2020-12-09 ASSESSMENT — PAIN SCALES - GENERAL: PAINLEVEL_OUTOF10: 1

## 2020-12-09 NOTE — SEDATION DOCUMENTATION
10:41 Patient arrived to [facility] with steady gait. 10:54 Reviewed procedure with patient and patient verbalizes understanding. VS taken. Consent signed. Reviewed history, medications, and allergies. 11:00 Patient assisted into mamm room and into stereo chair. Tech taking films. 11:05  Dr. Murl Cogan talking with patient and looking at films. 11:30  Area of concern located. Area cleaned with chloraprep x 3. Eviva KM16A lot [id number] exp 05- used during procedure. Dr. Murl Cogan then injected lido 2% into left breast to numb area. Then he took samples of tissue out using Eviva 0913-20  Lot [id number] exp 09-. Patient tolerated fair. Tissue out at 11:30 and into formalin. He then inserted Venda Eviva 13 lot [id number] exp 02-. Compression held to site. Steri strips, gauze, and tegaderm applied. Wrapped chest with ace wrap and inserted ice pack to bx site. Assisted patient out of mamm chair and into consultation room. Steady gait. !2:06 Discharge instructions reviewed with patient and patient verbalizes understanding. VS taken. Dressing clean, dry, and intact. Denies pain at present. Encouraged patient to call with any questions or concerns. Patient left [facility] with steady gait.

## 2020-12-14 ENCOUNTER — TELEPHONE (OUTPATIENT)
Dept: SURGERY | Age: 65
End: 2020-12-14

## 2020-12-14 ENCOUNTER — OFFICE VISIT (OUTPATIENT)
Dept: SURGERY | Age: 65
End: 2020-12-14
Payer: MEDICARE

## 2020-12-14 VITALS
WEIGHT: 224.2 LBS | SYSTOLIC BLOOD PRESSURE: 125 MMHG | HEIGHT: 71 IN | DIASTOLIC BLOOD PRESSURE: 81 MMHG | BODY MASS INDEX: 31.39 KG/M2 | HEART RATE: 101 BPM

## 2020-12-14 PROCEDURE — G8417 CALC BMI ABV UP PARAM F/U: HCPCS | Performed by: SURGERY

## 2020-12-14 PROCEDURE — 3017F COLORECTAL CA SCREEN DOC REV: CPT | Performed by: SURGERY

## 2020-12-14 PROCEDURE — G8427 DOCREV CUR MEDS BY ELIG CLIN: HCPCS | Performed by: SURGERY

## 2020-12-14 PROCEDURE — 99215 OFFICE O/P EST HI 40 MIN: CPT | Performed by: SURGERY

## 2020-12-14 PROCEDURE — 1123F ACP DISCUSS/DSCN MKR DOCD: CPT | Performed by: SURGERY

## 2020-12-14 PROCEDURE — G8400 PT W/DXA NO RESULTS DOC: HCPCS | Performed by: SURGERY

## 2020-12-14 PROCEDURE — G8484 FLU IMMUNIZE NO ADMIN: HCPCS | Performed by: SURGERY

## 2020-12-14 PROCEDURE — 1090F PRES/ABSN URINE INCON ASSESS: CPT | Performed by: SURGERY

## 2020-12-14 PROCEDURE — 1036F TOBACCO NON-USER: CPT | Performed by: SURGERY

## 2020-12-14 PROCEDURE — 4040F PNEUMOC VAC/ADMIN/RCVD: CPT | Performed by: SURGERY

## 2020-12-14 NOTE — TELEPHONE ENCOUNTER
Attempted to contact patient, unable to leave message phone rings continuously. Patient needs to be made aware of previous message. Refill on : lisdexamfetamine (VYVANSE) 40 MG capsule  [facility]  Call placed on refill line at 8:35am  Mom would like a call when RX is called in. OK to leave message on her phone.

## 2020-12-15 PROBLEM — Z17.0 CARCINOMA OF UPPER-OUTER QUADRANT OF LEFT BREAST IN FEMALE, ESTROGEN RECEPTOR POSITIVE (HCC): Status: ACTIVE | Noted: 2020-12-15

## 2020-12-15 PROBLEM — C50.412 CARCINOMA OF UPPER-OUTER QUADRANT OF LEFT BREAST IN FEMALE, ESTROGEN RECEPTOR POSITIVE (HCC): Status: ACTIVE | Noted: 2020-12-15

## 2020-12-15 NOTE — PROGRESS NOTES
CANCER TALK    PATIENT:  Kisha Simon    DATE:     12/15/20    SURGICAL DISCUSSION:    Parvin Simpson is a 72y.o. year old  female who presents for a discussion left breast cancer. She did not receive radiation or hormonal therapy after her DCIS diagnosis in 2018. I believe this is a recurrence. We underwent a description of the pathology of her tumor, the clinical stage, her treatment options of breast conservation versus simple mastectomy and sentinel lymph node biopsy. These were all discussed with her. The patient is aware that by having a sentinel lymph node if the sentinel lymph node is negative on frozen section and the final pathology is positive she will need to return to the Operating Room. She is also aware there is a 5% chance that the sentinel lymph node may not be identified at surgery and that she may need to have a completion axillary dissection. There is a 3-5% chance of a false/negative finding on sentinel lymph node biopsy. The indications for chemotherapy and radiation therapy were also discussed. The patient has undergone explanation of the complications of the procedures which are including but not limited to bleeding, infection, nerve injury and lymphedema. The variation in lymphedema rates between sentinel lymph node biopsy and the completion axillary dissection were discussed. The patient is aware that if she meets all the criteria of having clear margins that the survival and local recurrence rate for mastectomy and breast conservation are the same. The potential risk of local recurrence is 5-6%. She is aware that if her margins return positive on the lumpectomy specimen that she would need to have either a re-excision for margins or a completion mastectomy. Multiple questions were answered and the patient wanted to schedule a wire localized lumpectomy and sentinel lymph node biopsy at the next available time. She will need cardiac clearance due to her transplant. Greater than 50% of the time spent was reviewing management options with the patient. Approximately 50 minutes was spent with the patient and family. IMPRESSION:    Cancer Staging  Breast neoplasm, Tis (DCIS), left  Staging form: Breast, AJCC 8th Edition  - Clinical stage from 3/5/2018: Stage 0 (cTis (DCIS), cN0, cM0, ER+) - Signed by Ciara Villavicencio MD on 12/2/2020    Carcinoma of upper-outer quadrant of left breast in female, estrogen receptor positive ([facility] [address]  Staging form: Breast, AJCC 8th Edition  - Clinical stage from 12/14/2020: rcT1, cN0, cM0, G2, ER+, WA+ - Signed by Ciara Villavicencio MD on 12/15/2020     Consultations to    Plastic Surgery Not Applicable  Radiation/Oncology Yes   Medical/Oncology  Yes  Genetic Counseling Yes, But Patient declined  Fertility issues  Not Applicable    Yes, But Patient declined  Psychology Yes, But Patient declined    Dictated by:  Merrill Ennis MD 12/15/20      Cc: Teofilo Delgado, DO Dr. Beatrice Garcia    This note was partially generated using Dragon voice recognition system, and there may be some incorrect words, spellings, punctuation that were not noticed in checking the note before saving.

## 2020-12-29 ENCOUNTER — HOSPITAL ENCOUNTER (OUTPATIENT)
Dept: OCCUPATIONAL THERAPY | Age: 65
Setting detail: THERAPIES SERIES
Discharge: HOME | End: 2020-12-29
Payer: MEDICARE

## 2020-12-29 ENCOUNTER — NURSE ONLY (OUTPATIENT)
Dept: SURGERY | Age: 65
End: 2020-12-29
Payer: MEDICARE

## 2020-12-29 VITALS
DIASTOLIC BLOOD PRESSURE: 86 MMHG | BODY MASS INDEX: 31.22 KG/M2 | RESPIRATION RATE: 20 BRPM | HEIGHT: 71 IN | SYSTOLIC BLOOD PRESSURE: 147 MMHG | HEART RATE: 96 BPM | WEIGHT: 223 LBS

## 2020-12-29 PROCEDURE — 97166 OT EVAL MOD COMPLEX 45 MIN: CPT

## 2020-12-29 RX ORDER — LIDOCAINE AND PRILOCAINE 25; 25 MG/G; MG/G
CREAM TOPICAL
Qty: 1 TUBE | Refills: 0 | Status: SHIPPED | OUTPATIENT
Start: 2020-12-29 | End: 2021-08-30

## 2020-12-29 NOTE — PROGRESS NOTES
Occupational Therapy Lymphedema Prehab Screen    Date: 2020  Patient Name: Jovanna Cowan        MRN: [id number]  Account: [de-identified]   : 1955  (72 y.o.)    Chart Review:  Diagnosis:  There were no encounter diagnoses. Past Medical History:   Diagnosis Date    Depressive disorder     Ductal carcinoma in situ (DCIS) of left breast 2018    pTispNxpM   2 cm low grade negative margins ER strongly pos.  s/p partial mastectomy    Esophageal reflux     H/O heart transplant ([facility] [place] [address]     for dilated cardiomyopathy     HPV (human papilloma virus) anogenital infection     Hyperlipidemia     Hypertension     Kidney stones     Lumbago     Multiple nevi     KARMEN (obstructive sleep apnea)     Osteoarthritis     Type 2 diabetes mellitus without complication ([facility] [place] [address]      Past Surgical History:   Procedure Laterality Date    BREAST LUMPECTOMY      CATARACT REMOVAL Bilateral     CHOLECYSTECTOMY      HEART TRANSPLANT      BEE STEROTACTIC LOC BREAST BIOPSY LEFT  2020    BEE STEROTACTIC LOC BREAST BIOPSY LEFT 2020 [facility]       Strength:   WFL BUEs    ROM:    WNL BUEs    Observation:   Good skin integrity BUEs. Circumferential Measurements    Location Rt UE (cm)   Date:  Lt UE (cm)   Date:    3rd DIP/ PIP 4.8/6 4.5/6   10 cm from distal 3rd finger 21.2 21.7   15 cm 23.1 22.3   20 cm 17.6 17.9   30 cm 22.3 23.3   40 cm 29 28.6   50 cm 31 30   60 cm 37.5 36.7                                                     Instructed pt. in lymphedema signs and symptoms and provided written hand out. Yes    Instructed pt. in ROM HEP for prevention and reduction of lymphedema symptoms. Yes    Pt. and  demonstrate a good understanding of provided information. Plan:  Follow up with physician per physician orders. Therapy Time:   OT Individual Minutes  Time In: 1310  Time Out: 2188  Minutes: 39    Electronically signed by:     MILO Rider  2020, 1:51 PM

## 2020-12-29 NOTE — PROGRESS NOTES
Patient teaching completed for their scheduled Left Breast Needle LOC Lumpectomy and SLNB. I reviewed Surgical instructions with the Patient, including the following information, you will receive a phone call from the surgery schedulers the day prior to the surgery, usually between 3-5 pm, to let you know what time to report to surgery. A map of [facility] was given to the Patient with instructions on where to park and go the day of surgery. The Patient was instructed to Not Eat or drink anything after midnight, before their surgery. Approved medications, which you have discussed with your Doctor, can be taken the morning of surgery, with sips of water. All blood thinners, oral anticoagulants, Aspirin, NSAIDS, should be stopped at least ten days prior to surgery. Please follow your Doctors instructions. Do not chew gum, take cough drops or eat hard candy the morning of your surgery. Do not wear any jewelry. Remove all body piercings prior to arriving for surgery. Leave all valuable items at home. Avoid hairspray, make-up, deodorant, lotions, nail polish or acrylic nails. Bring all personal care items, toiletries, and a loose fitting shirt that buttons or zips in the front to wear when discharged. Dont forget to bring your cell phone . Bring your cases for dentures and glasses, do not wear contact lenses. You will need an adult to drive you home after your surgery or when you are discharged from the hospital.No driving for one week after Lumpectomy. The Surgeon will call you with your pathology results in about 7-10 days. Do not lift greater than 10 pounds or use the affected arm to reach upwards after surgery, until the Doctor clears you post operatively. You may be asked to wear a surgical bra. If it is recommended that you wear one,it will be given to you after your surgery. Wear the surgical bra /ace wrap for the first 48 hours after surgery.  After surgery, you will need to take care of your incision as it heals. Either stitches, staples, or tape strips ( steri-strips),were used to close your incision. You will need to keep the area clean, change the dressing according to the wound care instructions that were given to you and observe for s/s of infection at each dressing change. You may notice soreness, tenderness, tingling, numbness and itching around your incision. There may also be mild oozing and bruising, and a small lump may form. This is normal and no cause for concern. Placing an ice bag on the site,it may help with any swelling or discomfort. After 48 hours you may, remove the outer gauze bandage. You can take a shower. Allow the soap and water to just run over the incision, do not scrub the incision, gently pat the area dry. If steri strips or surgical glue are covering the incision,do not remove, the surgeon will address the steri strips or glue at your post operative appointment. Redress the wound with gauze/Band-Aid as needed. Take Tylenol or Motrin for pain. If you notice any of the following signs of an infection, such as, a yellow or green discharge that is increasing, a change in the odor of the discharge/drainage, a change in the size of the incision,redness or hardening of the area surrounding the incision or if it is  hot to the touch, a fever, or excessive bleeding that has soaked through the dressing, notify the surgeon as soon as possible. I educated the Patient on KENDALL drains . I explained that they may have an external drainage device after surgery. I reviewed how to empty and record the drainage output on the log sheet they were given. The Patient is aware to call Dr. Susan Arteaga, if they develop a fever over 101? F, increased drainage (more than 240 cc 8 ounces per drain over one 24-hour period), or increased pain not controlled by the pain medication. I explained that the fluid output should gradually decrease. The color, although not vital, might change from red to red-orange, to straw color. If it is thick, pus -like, and / or changes to red,to please call Dr. Bell Heads office. The ideal time to remove the drains is when the output is less than 30 cc per 24-hours for 2 days consecutively. It can take up to two weeks for that to happen. Dr. Olesya Thakur will typically remove the drains if it has been more than two weeks. Patient is aware to bring the drainage log to the post operative appointment. If the drainage record indicates that there is still too much fluid draining to have the drain removed, Dr. Olesya Thakur will determine when you should return to the office. Patient is aware to empty, measure and to record the drainage at least three times daily, using the cc side of the measuring cup. They were given a lanyard and it was explained that the drains should be pinned to the Bra, clothing or the lanyard so the drain will not be pulled out of the body. I reviewed the procedure for emptying the drain as follows, get all the supplies and work in a clean area: measuring cup(s), drain sheet, pen . You and/or your assistant must wash and dry your hands. Unpin the drain(s). Open the stopper out of the bulb and empty the fluid into the measuring cup. Educated the Patient on the importance of milking/ stripping the tubing three times a day and it was practiced on a KENDALL drain. Patient was then taught to squeeze the drainage bulb while firmly pushing the stopper back into place. Then to, re-pin the drain to their bra/clothing/lanyard. Then make sure to record the amount of drainage on the log. Empty the cup into the toilet or sink, then rinse and store it for future use. Begin recording the drainage the day you are discharged from the hospital.Pre-Operative Lymphedema arm measurements were completed by OT. I educated the Patient on Lymphedema and how to protect the affected limb to avoid injuries. I reviewed post operative exercises. The Patient is aware to not begin exercising until the surgeon has approved exercising post operatively. Your postop appointment is on 1- at 9 AM at the [facility] office. The Patient verbalized understanding of all of the surgical instructions and has no further questions at this time.

## 2020-12-31 ENCOUNTER — OFFICE VISIT (OUTPATIENT)
Dept: FAMILY MEDICINE CLINIC | Age: 65
End: 2020-12-31
Payer: MEDICARE

## 2020-12-31 VITALS
SYSTOLIC BLOOD PRESSURE: 118 MMHG | BODY MASS INDEX: 31.22 KG/M2 | OXYGEN SATURATION: 99 % | DIASTOLIC BLOOD PRESSURE: 76 MMHG | HEART RATE: 89 BPM | TEMPERATURE: 97.6 F | WEIGHT: 223 LBS | HEIGHT: 71 IN

## 2020-12-31 DIAGNOSIS — Z01.818 PRE-OP EXAMINATION: ICD-10-CM

## 2020-12-31 DIAGNOSIS — Z01.818 PRE-OP EXAMINATION: Primary | ICD-10-CM

## 2020-12-31 PROCEDURE — G8427 DOCREV CUR MEDS BY ELIG CLIN: HCPCS | Performed by: NURSE PRACTITIONER

## 2020-12-31 PROCEDURE — 93000 ELECTROCARDIOGRAM COMPLETE: CPT | Performed by: NURSE PRACTITIONER

## 2020-12-31 PROCEDURE — 99203 OFFICE O/P NEW LOW 30 MIN: CPT | Performed by: NURSE PRACTITIONER

## 2020-12-31 PROCEDURE — G8417 CALC BMI ABV UP PARAM F/U: HCPCS | Performed by: NURSE PRACTITIONER

## 2020-12-31 PROCEDURE — 1090F PRES/ABSN URINE INCON ASSESS: CPT | Performed by: NURSE PRACTITIONER

## 2020-12-31 PROCEDURE — G8484 FLU IMMUNIZE NO ADMIN: HCPCS | Performed by: NURSE PRACTITIONER

## 2020-12-31 ASSESSMENT — PATIENT HEALTH QUESTIONNAIRE - PHQ9
SUM OF ALL RESPONSES TO PHQ QUESTIONS 1-9: 0
SUM OF ALL RESPONSES TO PHQ9 QUESTIONS 1 & 2: 0
2. FEELING DOWN, DEPRESSED OR HOPELESS: 0

## 2020-12-31 NOTE — PROGRESS NOTES
Preoperative Consultation      Elis Lawson  YOB: 1955    Date of Service:  12/31/2020    Vitals:    12/31/20 1611   BP: 118/76   Site: Right Upper Arm   Position: Sitting   Cuff Size: Large Adult   Pulse: 89   Temp: 97.6 °F (36.4 °C)   TempSrc: Temporal   SpO2: 99%   Weight: 223 lb (101.2 kg)   Height: 5' 10.5\" (1.791 m)      Wt Readings from Last 2 Encounters:   12/31/20 223 lb (101.2 kg)   12/29/20 223 lb (101.2 kg)     BP Readings from Last 3 Encounters:   12/31/20 118/76   12/29/20 (!) 147/86   12/14/20 125/81        Chief Complaint   Patient presents with   Jack Nath Pre-op Exam     Patient here for Pre-op for (DR. WHYTE) LEFT BREAST NEEDLE LOCALIZED LUMPECTOMY AND SENTINEL LYMPH NODE BIOPSY  SURGERY 1/7/21     Allergies   Allergen Reactions    Metformin      Other reaction(s): GI Upset    Metoclopramide      Other reaction(s): Intolerance  Teeth grinding    Naproxen     Nsaids Other (See Comments)     Other reaction(s): Other: See Comments  S/p heart transplant; decreased renal function  S/p heart transplant; decreased renal function    Procaine Hives    Iodides Rash     Outpatient Medications Marked as Taking for the 12/31/20 encounter (Office Visit) with Lorenzo Camarillo.  ALEX Thornton - CNP   Medication Sig Dispense Refill    Multiple Vitamins-Minerals (OCUVITE ADULT FORMULA PO) Take by mouth      lidocaine-prilocaine (EMLA) 2.5-2.5 % cream Indications: apply topically to Left areola one hour prior to procedure Apply topically to left areola one hour prior to procedure 1 Tube 0    triamcinolone (KENALOG) 0.1 % cream Apply 1 Dose topically daily      diclofenac sodium (VOLTAREN) 1 % GEL Apply 2 g topically 4 times daily      augmented betamethasone dipropionate (DIPROLENE-AF) 0.05 % cream Apply 1 Dose topically 2 times daily      insulin lispro (HUMALOG) 100 UNIT/ML injection vial Inject into the skin 3 times daily (before meals) Indications: sliding scale 15 units TID Patient objection to receiving blood products: No    Patient Active Problem List   Diagnosis    Ductal carcinoma in situ (DCIS) of left breast    [facility] ([facility] [address] [facility]    Esophageal reflux    Hyperlipidemia    Hypertension    KARMEN (obstructive sleep apnea)    Type 2 diabetes mellitus without complication ([facility] [address] [facility]    Breast neoplasm, Tis (DCIS), left    Abnormal mammogram of left breast    Carcinoma of upper-outer quadrant of left breast in female, estrogen receptor positive ([facility] [address]       Past Medical History:   Diagnosis Date    Depressive disorder     Ductal carcinoma in situ (DCIS) of left breast 01/30/2018    pTispNxpM   2 cm low grade negative margins ER strongly pos.  s/p partial mastectomy    Esophageal reflux     H/O heart transplant ([facility] [address] 2004    for dilated cardiomyopathy     HPV (human papilloma virus) anogenital infection     Hyperlipidemia     Hypertension     Kidney stones     Lumbago     Multiple nevi     KARMEN (obstructive sleep apnea)     Osteoarthritis     Type 2 diabetes mellitus without complication ([facility] [address]      Past Surgical History:   Procedure Laterality Date    BREAST LUMPECTOMY      CATARACT REMOVAL Bilateral     CHOLECYSTECTOMY  2011    HEART TRANSPLANT  2004    BEE STEROTACTIC LOC BREAST BIOPSY LEFT  12/9/2020    BEE STEROTACTIC LOC BREAST BIOP[place] [facility]     Family History   Problem Relation Age of Onset    Breast Cancer Paternal Grandmother     Cancer Paternal Grandmother      Social History     Socioeconomic History    Marital status:      Spouse name: Not on file    Number of children: Not on file    Years of education: Not on file    Highest education level: Not on file   Occupational History    Not on file   Social Needs    Financial resource strain: Not on file    Food insecurity     Worry: Not on file     Inability: Not on file    Transportation needs     Medical: Not on file     Non-medical: Not on file   Tobacco Use  Smoking status: Former Smoker     Packs/day: 10.00     Quit date: 1986     Years since quittin.0    Smokeless tobacco: Never Used   Substance and Sexual Activity    Alcohol use: No     Comment: occ    Drug use: Never    Sexual activity: Yes     Partners: Male   Lifestyle    Physical activity     Days per week: Not on file     Minutes per session: Not on file    Stress: Not on file   Relationships    Social connections     Talks on phone: Not on file     Gets together: Not on file     Attends Zoroastrian service: Not on file     Active member of club or organization: Not on file     Attends meetings of clubs or organizations: Not on file     Relationship status: Not on file    Intimate partner violence     Fear of current or ex partner: Not on file     Emotionally abused: Not on file     Physically abused: Not on file     Forced sexual activity: Not on file   Other Topics Concern    Not on file   Social History Narrative    Not on file       Review of Systems  A comprehensive review of systems was negative except for what was noted in the HPI. Physical Exam   Constitutional: She is oriented to person, place, and time. She appears well-developed and well-nourished. No distress. HENT:   Head: Normocephalic and atraumatic. Mouth/Throat: Uvula is midline, oropharynx is clear and moist and mucous membranes are normal.   Eyes: Conjunctivae and EOM are normal. Pupils are equal, round, and reactive to light. Neck: Trachea normal and normal range of motion. Neck supple. No JVD present. Carotid bruit is not present. No mass and no thyromegaly present. Cardiovascular: Normal rate, regular rhythm, normal heart sounds and intact distal pulses. Exam reveals no gallop and no friction rub. No murmur heard. Pulmonary/Chest: Effort normal and breath sounds normal. No respiratory distress. She has no wheezes. She has no rales. 5. Please review the above to proceed with surgery

## 2021-01-03 LAB
SARS-COV-2: DETECTED
SOURCE: ABNORMAL

## 2021-01-04 ENCOUNTER — TELEPHONE (OUTPATIENT)
Dept: OBGYN CLINIC | Age: 66
End: 2021-01-04

## 2021-01-04 PROBLEM — Z79.60 IMMUNODEFICIENCY DUE TO TREATMENT WITH IMMUNOSUPPRESSIVE MEDICATION: Status: ACTIVE | Noted: 2019-11-13

## 2021-01-04 PROBLEM — Z79.899 IMMUNODEFICIENCY DUE TO TREATMENT WITH IMMUNOSUPPRESSIVE MEDICATION (HCC): Status: ACTIVE | Noted: 2019-11-13

## 2021-01-04 PROBLEM — E66.9 OBESITY, CLASS I, BMI 30-34.9: Status: ACTIVE | Noted: 2018-09-11

## 2021-01-04 PROBLEM — D24.9 PAPILLOMA OF BREAST: Status: ACTIVE | Noted: 2017-12-14

## 2021-01-04 PROBLEM — T45.1X5A IMMUNODEFICIENCY DUE TO TREATMENT WITH IMMUNOSUPPRESSIVE MEDICATION: Status: ACTIVE | Noted: 2019-11-13

## 2021-01-04 PROBLEM — D84.821 IMMUNODEFICIENCY DUE TO TREATMENT WITH IMMUNOSUPPRESSIVE MEDICATION (HCC): Status: ACTIVE | Noted: 2019-11-13

## 2021-01-04 PROBLEM — M72.2 PLANTAR FASCIITIS: Status: ACTIVE | Noted: 2019-08-06

## 2021-01-04 PROBLEM — M47.817 FACET ARTHROPATHY, LUMBOSACRAL: Status: ACTIVE | Noted: 2019-04-12

## 2021-01-04 PROBLEM — M76.829 PTTD (POSTERIOR TIBIAL TENDON DYSFUNCTION): Status: ACTIVE | Noted: 2019-08-06

## 2021-01-04 PROBLEM — E66.811 OBESITY, CLASS I, BMI 30-34.9: Status: ACTIVE | Noted: 2018-09-11

## 2021-01-04 PROBLEM — M79.672 PAIN IN LEFT FOOT: Status: ACTIVE | Noted: 2019-08-06

## 2021-01-04 PROBLEM — K76.0 FATTY INFILTRATION OF LIVER: Status: ACTIVE | Noted: 2018-05-18

## 2021-01-04 PROBLEM — H43.813 PVD (POSTERIOR VITREOUS DETACHMENT), BILATERAL: Status: ACTIVE | Noted: 2017-11-14

## 2021-01-04 RX ORDER — DIPHENHYDRAMINE HCL 25 MG
TABLET ORAL
COMMUNITY
Start: 2020-12-18

## 2021-01-04 RX ORDER — CALCIUM CITRATE/VITAMIN D3 200MG-6.25
TABLET ORAL
COMMUNITY
Start: 2020-12-18

## 2021-01-04 RX ORDER — GLUCOSAM/CHON-MSM1/C/MANG/BOSW 500-416.6
TABLET ORAL
COMMUNITY
Start: 2020-12-18

## 2021-01-15 ENCOUNTER — NURSE ONLY (OUTPATIENT)
Dept: PRIMARY CARE CLINIC | Age: 66
End: 2021-01-15

## 2021-01-15 DIAGNOSIS — Z01.818 ENCOUNTER FOR PREADMISSION TESTING: ICD-10-CM

## 2021-01-15 DIAGNOSIS — Z01.818 ENCOUNTER FOR PREADMISSION TESTING: Primary | ICD-10-CM

## 2021-01-16 LAB
SARS-COV-2: NOT DETECTED
SOURCE: NORMAL

## 2021-01-21 ENCOUNTER — ANESTHESIA EVENT (OUTPATIENT)
Dept: OPERATING ROOM | Age: 66
End: 2021-01-21
Payer: MEDICARE

## 2021-01-21 ENCOUNTER — HOSPITAL ENCOUNTER (OUTPATIENT)
Dept: WOMENS IMAGING | Age: 66
Discharge: HOME | End: 2021-01-23
Attending: SURGERY
Payer: MEDICARE

## 2021-01-21 ENCOUNTER — HOSPITAL ENCOUNTER (OUTPATIENT)
Age: 66
Setting detail: OUTPATIENT SURGERY
Discharge: HOME | End: 2021-01-21
Attending: SURGERY | Admitting: SURGERY
Payer: MEDICARE

## 2021-01-21 ENCOUNTER — HOSPITAL ENCOUNTER (OUTPATIENT)
Dept: NUCLEAR MEDICINE | Age: 66
Discharge: HOME | End: 2021-01-23
Payer: MEDICARE

## 2021-01-21 ENCOUNTER — ANESTHESIA (OUTPATIENT)
Dept: OPERATING ROOM | Age: 66
End: 2021-01-21
Payer: MEDICARE

## 2021-01-21 VITALS — RESPIRATION RATE: 20 BRPM | DIASTOLIC BLOOD PRESSURE: 74 MMHG | HEART RATE: 100 BPM | SYSTOLIC BLOOD PRESSURE: 140 MMHG

## 2021-01-21 VITALS
SYSTOLIC BLOOD PRESSURE: 132 MMHG | DIASTOLIC BLOOD PRESSURE: 56 MMHG | HEART RATE: 84 BPM | OXYGEN SATURATION: 94 % | TEMPERATURE: 96.8 F | RESPIRATION RATE: 12 BRPM

## 2021-01-21 VITALS — DIASTOLIC BLOOD PRESSURE: 69 MMHG | TEMPERATURE: 96.3 F | OXYGEN SATURATION: 98 % | SYSTOLIC BLOOD PRESSURE: 119 MMHG

## 2021-01-21 DIAGNOSIS — Z85.3 HISTORY OF LEFT BREAST CANCER: ICD-10-CM

## 2021-01-21 DIAGNOSIS — Z17.0 CARCINOMA OF UPPER-OUTER QUADRANT OF LEFT BREAST IN FEMALE, ESTROGEN RECEPTOR POSITIVE (HCC): ICD-10-CM

## 2021-01-21 DIAGNOSIS — C50.412 CARCINOMA OF UPPER-OUTER QUADRANT OF LEFT BREAST IN FEMALE, ESTROGEN RECEPTOR POSITIVE (HCC): ICD-10-CM

## 2021-01-21 LAB
GLUCOSE BLD-MCNC: 152 MG/DL (ref 60–115)
GLUCOSE BLD-MCNC: 99 MG/DL (ref 60–115)
PERFORMED ON: ABNORMAL
PERFORMED ON: NORMAL

## 2021-01-21 PROCEDURE — 3430000000 HC RX DIAGNOSTIC RADIOPHARMACEUTICAL: Performed by: SURGERY

## 2021-01-21 PROCEDURE — 38792 RA TRACER ID OF SENTINL NODE: CPT

## 2021-01-21 PROCEDURE — 6370000000 HC RX 637 (ALT 250 FOR IP): Performed by: ANESTHESIOLOGY

## 2021-01-21 PROCEDURE — 76998 US GUIDE INTRAOP: CPT | Performed by: SURGERY

## 2021-01-21 PROCEDURE — 88307 TISSUE EXAM BY PATHOLOGIST: CPT

## 2021-01-21 PROCEDURE — 19301 PARTIAL MASTECTOMY: CPT | Performed by: SURGERY

## 2021-01-21 PROCEDURE — 88305 TISSUE EXAM BY PATHOLOGIST: CPT

## 2021-01-21 PROCEDURE — 6360000002 HC RX W HCPCS

## 2021-01-21 PROCEDURE — 7100000001 HC PACU RECOVERY - ADDTL 15 MIN: Performed by: SURGERY

## 2021-01-21 PROCEDURE — A9541 TC99M SULFUR COLLOID: HCPCS | Performed by: SURGERY

## 2021-01-21 PROCEDURE — 76098 X-RAY EXAM SURGICAL SPECIMEN: CPT

## 2021-01-21 PROCEDURE — 3700000000 HC ANESTHESIA ATTENDED CARE: Performed by: SURGERY

## 2021-01-21 PROCEDURE — 7100000010 HC PHASE II RECOVERY - FIRST 15 MIN: Performed by: SURGERY

## 2021-01-21 PROCEDURE — 6370000000 HC RX 637 (ALT 250 FOR IP): Performed by: SURGERY

## 2021-01-21 PROCEDURE — 38525 BIOPSY/REMOVAL LYMPH NODES: CPT | Performed by: SURGERY

## 2021-01-21 PROCEDURE — 6360000002 HC RX W HCPCS: Performed by: ANESTHESIOLOGY

## 2021-01-21 PROCEDURE — 64450 NJX AA&/STRD OTHER PN/BRANCH: CPT | Performed by: ANESTHESIOLOGY

## 2021-01-21 PROCEDURE — 2709999900 HC NON-CHARGEABLE SUPPLY

## 2021-01-21 PROCEDURE — C1819 TISSUE LOCALIZATION-EXCISION: HCPCS

## 2021-01-21 PROCEDURE — 6360000002 HC RX W HCPCS: Performed by: SURGERY

## 2021-01-21 PROCEDURE — 14000 TIS TRNFR TRUNK 10 SQ CM/<: CPT | Performed by: SURGERY

## 2021-01-21 PROCEDURE — 2580000003 HC RX 258: Performed by: ANESTHESIOLOGY

## 2021-01-21 PROCEDURE — 2720000010 HC SURG SUPPLY STERILE: Performed by: SURGERY

## 2021-01-21 PROCEDURE — 3600000014 HC SURGERY LEVEL 4 ADDTL 15MIN: Performed by: SURGERY

## 2021-01-21 PROCEDURE — 3600000004 HC SURGERY LEVEL 4 BASE: Performed by: SURGERY

## 2021-01-21 PROCEDURE — 88341 IMHCHEM/IMCYTCHM EA ADD ANTB: CPT

## 2021-01-21 PROCEDURE — 19281 PERQ DEVICE BREAST 1ST IMAG: CPT

## 2021-01-21 PROCEDURE — 7100000011 HC PHASE II RECOVERY - ADDTL 15 MIN: Performed by: SURGERY

## 2021-01-21 PROCEDURE — 2580000003 HC RX 258: Performed by: SURGERY

## 2021-01-21 PROCEDURE — 38900 IO MAP OF SENT LYMPH NODE: CPT | Performed by: SURGERY

## 2021-01-21 PROCEDURE — 2500000003 HC RX 250 WO HCPCS

## 2021-01-21 PROCEDURE — 2709999900 HC NON-CHARGEABLE SUPPLY: Performed by: SURGERY

## 2021-01-21 PROCEDURE — 3700000001 HC ADD 15 MINUTES (ANESTHESIA): Performed by: SURGERY

## 2021-01-21 PROCEDURE — 7100000000 HC PACU RECOVERY - FIRST 15 MIN: Performed by: SURGERY

## 2021-01-21 PROCEDURE — 2500000003 HC RX 250 WO HCPCS: Performed by: RADIOLOGY

## 2021-01-21 PROCEDURE — 88342 IMHCHEM/IMCYTCHM 1ST ANTB: CPT

## 2021-01-21 DEVICE — CLIP INT SM TI EZ LD LIG SYS WECK HORZ: Type: IMPLANTABLE DEVICE | Status: FUNCTIONAL

## 2021-01-21 RX ORDER — MEPERIDINE HYDROCHLORIDE 25 MG/ML
12.5 INJECTION INTRAMUSCULAR; INTRAVENOUS; SUBCUTANEOUS EVERY 5 MIN PRN
Status: DISCONTINUED | OUTPATIENT
Start: 2021-01-21 | End: 2021-01-21 | Stop reason: HOSPADM

## 2021-01-21 RX ORDER — DEXAMETHASONE SODIUM PHOSPHATE 10 MG/ML
INJECTION INTRAMUSCULAR; INTRAVENOUS PRN
Status: DISCONTINUED | OUTPATIENT
Start: 2021-01-21 | End: 2021-01-21 | Stop reason: SDUPTHER

## 2021-01-21 RX ORDER — DEXAMETHASONE SODIUM PHOSPHATE 4 MG/ML
INJECTION, SOLUTION INTRA-ARTICULAR; INTRALESIONAL; INTRAMUSCULAR; INTRAVENOUS; SOFT TISSUE PRN
Status: DISCONTINUED | OUTPATIENT
Start: 2021-01-21 | End: 2021-01-21 | Stop reason: SDUPTHER

## 2021-01-21 RX ORDER — ONDANSETRON 2 MG/ML
INJECTION INTRAMUSCULAR; INTRAVENOUS PRN
Status: DISCONTINUED | OUTPATIENT
Start: 2021-01-21 | End: 2021-01-21 | Stop reason: SDUPTHER

## 2021-01-21 RX ORDER — HYDROCODONE BITARTRATE AND ACETAMINOPHEN 5; 325 MG/1; MG/1
1 TABLET ORAL PRN
Status: COMPLETED | OUTPATIENT
Start: 2021-01-21 | End: 2021-01-21

## 2021-01-21 RX ORDER — HYDROCODONE BITARTRATE AND ACETAMINOPHEN 5; 325 MG/1; MG/1
2 TABLET ORAL PRN
Status: COMPLETED | OUTPATIENT
Start: 2021-01-21 | End: 2021-01-21

## 2021-01-21 RX ORDER — DIPHENHYDRAMINE HYDROCHLORIDE 50 MG/ML
12.5 INJECTION INTRAMUSCULAR; INTRAVENOUS
Status: DISCONTINUED | OUTPATIENT
Start: 2021-01-21 | End: 2021-01-21 | Stop reason: HOSPADM

## 2021-01-21 RX ORDER — FENTANYL CITRATE 50 UG/ML
25 INJECTION, SOLUTION INTRAMUSCULAR; INTRAVENOUS EVERY 5 MIN PRN
Status: DISCONTINUED | OUTPATIENT
Start: 2021-01-21 | End: 2021-01-21 | Stop reason: HOSPADM

## 2021-01-21 RX ORDER — LIDOCAINE HYDROCHLORIDE 10 MG/ML
INJECTION, SOLUTION EPIDURAL; INFILTRATION; INTRACAUDAL; PERINEURAL PRN
Status: DISCONTINUED | OUTPATIENT
Start: 2021-01-21 | End: 2021-01-21 | Stop reason: SDUPTHER

## 2021-01-21 RX ORDER — MAGNESIUM HYDROXIDE 1200 MG/15ML
LIQUID ORAL PRN
Status: DISCONTINUED | OUTPATIENT
Start: 2021-01-21 | End: 2021-01-21 | Stop reason: ALTCHOICE

## 2021-01-21 RX ORDER — FENTANYL CITRATE 50 UG/ML
INJECTION, SOLUTION INTRAMUSCULAR; INTRAVENOUS PRN
Status: DISCONTINUED | OUTPATIENT
Start: 2021-01-21 | End: 2021-01-21 | Stop reason: SDUPTHER

## 2021-01-21 RX ORDER — PROCHLORPERAZINE EDISYLATE 5 MG/ML
5 INJECTION INTRAMUSCULAR; INTRAVENOUS
Status: DISCONTINUED | OUTPATIENT
Start: 2021-01-21 | End: 2021-01-21 | Stop reason: HOSPADM

## 2021-01-21 RX ORDER — 0.9 % SODIUM CHLORIDE 0.9 %
500 INTRAVENOUS SOLUTION INTRAVENOUS
Status: DISCONTINUED | OUTPATIENT
Start: 2021-01-21 | End: 2021-01-21 | Stop reason: HOSPADM

## 2021-01-21 RX ORDER — SODIUM CHLORIDE 9 MG/ML
INJECTION INTRAVENOUS PRN
Status: DISCONTINUED | OUTPATIENT
Start: 2021-01-21 | End: 2021-01-21 | Stop reason: ALTCHOICE

## 2021-01-21 RX ORDER — PROPOFOL 10 MG/ML
INJECTION, EMULSION INTRAVENOUS PRN
Status: DISCONTINUED | OUTPATIENT
Start: 2021-01-21 | End: 2021-01-21 | Stop reason: SDUPTHER

## 2021-01-21 RX ORDER — METHYLENE BLUE 10 MG/ML
INJECTION INTRAVENOUS PRN
Status: DISCONTINUED | OUTPATIENT
Start: 2021-01-21 | End: 2021-01-21 | Stop reason: ALTCHOICE

## 2021-01-21 RX ORDER — LABETALOL HYDROCHLORIDE 5 MG/ML
5 INJECTION, SOLUTION INTRAVENOUS EVERY 10 MIN PRN
Status: DISCONTINUED | OUTPATIENT
Start: 2021-01-21 | End: 2021-01-21 | Stop reason: HOSPADM

## 2021-01-21 RX ORDER — ONDANSETRON 2 MG/ML
4 INJECTION INTRAMUSCULAR; INTRAVENOUS
Status: DISCONTINUED | OUTPATIENT
Start: 2021-01-21 | End: 2021-01-21 | Stop reason: HOSPADM

## 2021-01-21 RX ORDER — CEFAZOLIN SODIUM 2 G/50ML
2000 SOLUTION INTRAVENOUS
Status: COMPLETED | OUTPATIENT
Start: 2021-01-21 | End: 2021-01-21

## 2021-01-21 RX ORDER — LIDOCAINE HYDROCHLORIDE 20 MG/ML
20 INJECTION, SOLUTION INFILTRATION; PERINEURAL ONCE
Status: COMPLETED | OUTPATIENT
Start: 2021-01-21 | End: 2021-01-21

## 2021-01-21 RX ORDER — MIDAZOLAM HYDROCHLORIDE 1 MG/ML
INJECTION INTRAMUSCULAR; INTRAVENOUS PRN
Status: DISCONTINUED | OUTPATIENT
Start: 2021-01-21 | End: 2021-01-21 | Stop reason: SDUPTHER

## 2021-01-21 RX ORDER — ROPIVACAINE HYDROCHLORIDE 5 MG/ML
INJECTION, SOLUTION EPIDURAL; INFILTRATION; PERINEURAL
Status: COMPLETED | OUTPATIENT
Start: 2021-01-21 | End: 2021-01-21

## 2021-01-21 RX ORDER — SODIUM CHLORIDE, SODIUM LACTATE, POTASSIUM CHLORIDE, CALCIUM CHLORIDE 600; 310; 30; 20 MG/100ML; MG/100ML; MG/100ML; MG/100ML
INJECTION, SOLUTION INTRAVENOUS CONTINUOUS
Status: DISCONTINUED | OUTPATIENT
Start: 2021-01-21 | End: 2021-01-21 | Stop reason: HOSPADM

## 2021-01-21 RX ADMIN — FENTANYL CITRATE 25 MCG: 50 INJECTION, SOLUTION INTRAMUSCULAR; INTRAVENOUS at 11:21

## 2021-01-21 RX ADMIN — LIDOCAINE HYDROCHLORIDE 50 MG: 10 INJECTION, SOLUTION EPIDURAL; INFILTRATION; INTRACAUDAL; PERINEURAL at 09:11

## 2021-01-21 RX ADMIN — FENTANYL CITRATE 25 MCG: 50 INJECTION, SOLUTION INTRAMUSCULAR; INTRAVENOUS at 09:41

## 2021-01-21 RX ADMIN — CEFAZOLIN SODIUM 2 G: 2 SOLUTION INTRAVENOUS at 09:22

## 2021-01-21 RX ADMIN — PHENYLEPHRINE HYDROCHLORIDE 100 MCG: 10 INJECTION INTRAVENOUS at 09:50

## 2021-01-21 RX ADMIN — ONDANSETRON 4 MG: 2 INJECTION INTRAMUSCULAR; INTRAVENOUS at 10:13

## 2021-01-21 RX ADMIN — LIDOCAINE HYDROCHLORIDE 20 ML: 20 INJECTION, SOLUTION INFILTRATION; PERINEURAL at 07:18

## 2021-01-21 RX ADMIN — PHENYLEPHRINE HYDROCHLORIDE 100 MCG: 10 INJECTION INTRAVENOUS at 10:00

## 2021-01-21 RX ADMIN — SODIUM CHLORIDE, POTASSIUM CHLORIDE, SODIUM LACTATE AND CALCIUM CHLORIDE: 600; 310; 30; 20 INJECTION, SOLUTION INTRAVENOUS at 10:06

## 2021-01-21 RX ADMIN — DEXAMETHASONE SODIUM PHOSPHATE 4 MG: 4 INJECTION, SOLUTION INTRAMUSCULAR; INTRAVENOUS at 09:19

## 2021-01-21 RX ADMIN — SODIUM CHLORIDE, POTASSIUM CHLORIDE, SODIUM LACTATE AND CALCIUM CHLORIDE: 600; 310; 30; 20 INJECTION, SOLUTION INTRAVENOUS at 08:11

## 2021-01-21 RX ADMIN — FENTANYL CITRATE 25 MCG: 50 INJECTION, SOLUTION INTRAMUSCULAR; INTRAVENOUS at 11:16

## 2021-01-21 RX ADMIN — PHENYLEPHRINE HYDROCHLORIDE 100 MCG: 10 INJECTION INTRAVENOUS at 09:34

## 2021-01-21 RX ADMIN — ROPIVACAINE HYDROCHLORIDE 30 ML: 5 INJECTION, SOLUTION EPIDURAL; INFILTRATION; PERINEURAL at 08:43

## 2021-01-21 RX ADMIN — SODIUM CHLORIDE, POTASSIUM CHLORIDE, SODIUM LACTATE AND CALCIUM CHLORIDE: 600; 310; 30; 20 INJECTION, SOLUTION INTRAVENOUS at 09:07

## 2021-01-21 RX ADMIN — HYDROCODONE BITARTRATE AND ACETAMINOPHEN 2 TABLET: 5; 325 TABLET ORAL at 12:12

## 2021-01-21 RX ADMIN — MIDAZOLAM HYDROCHLORIDE 2 MG: 2 INJECTION, SOLUTION INTRAMUSCULAR; INTRAVENOUS at 09:07

## 2021-01-21 RX ADMIN — MIDAZOLAM HYDROCHLORIDE 2 MG: 2 INJECTION, SOLUTION INTRAMUSCULAR; INTRAVENOUS at 08:43

## 2021-01-21 RX ADMIN — DEXAMETHASONE SODIUM PHOSPHATE 4 MG: 10 INJECTION INTRAMUSCULAR; INTRAVENOUS at 09:29

## 2021-01-21 RX ADMIN — PROPOFOL 150 MG: 10 INJECTION, EMULSION INTRAVENOUS at 09:11

## 2021-01-21 RX ADMIN — PROPOFOL 30 MG: 10 INJECTION, EMULSION INTRAVENOUS at 09:39

## 2021-01-21 RX ADMIN — Medication 2 MILLICURIE: at 07:32

## 2021-01-21 ASSESSMENT — PULMONARY FUNCTION TESTS
PIF_VALUE: 20
PIF_VALUE: 1
PIF_VALUE: 20
PIF_VALUE: 2
PIF_VALUE: 20
PIF_VALUE: 20
PIF_VALUE: 15
PIF_VALUE: 1
PIF_VALUE: 20
PIF_VALUE: 17
PIF_VALUE: 2
PIF_VALUE: 18
PIF_VALUE: 20
PIF_VALUE: 20
PIF_VALUE: 2
PIF_VALUE: 20
PIF_VALUE: 3
PIF_VALUE: 20
PIF_VALUE: 20
PIF_VALUE: 21
PIF_VALUE: 20
PIF_VALUE: 3
PIF_VALUE: 17
PIF_VALUE: 17
PIF_VALUE: 3
PIF_VALUE: 20
PIF_VALUE: 2
PIF_VALUE: 2
PIF_VALUE: 4
PIF_VALUE: 20
PIF_VALUE: 18
PIF_VALUE: 1
PIF_VALUE: 20
PIF_VALUE: 20
PIF_VALUE: 3
PIF_VALUE: 20
PIF_VALUE: 20
PIF_VALUE: 5
PIF_VALUE: 20
PIF_VALUE: 4
PIF_VALUE: 20
PIF_VALUE: 1
PIF_VALUE: 20
PIF_VALUE: 20
PIF_VALUE: 2
PIF_VALUE: 2
PIF_VALUE: 19
PIF_VALUE: 20
PIF_VALUE: 17
PIF_VALUE: 20

## 2021-01-21 ASSESSMENT — PAIN SCALES - GENERAL
PAINLEVEL_OUTOF10: 10
PAINLEVEL_OUTOF10: 10
PAINLEVEL_OUTOF10: 0

## 2021-01-21 NOTE — SEDATION DOCUMENTATION
0449 Patient arrived to [facility] via wheelchair from short stay surgery. 9314 Reviewed procedure with patient and patient verbalizes understanding. Reviewed history, medications, and allergies. Consent was signed. VS taken. 2230 Patient assisted into mamm room and into needle loc chair. Tech taking films. 8254 Dr. Carl Plascencia talking with patient and looking at films. Area of concern located D 1/4 and 1 1/3. Area cleaned with chloraprep x 3. Dr. Carl Plascencia injected lido 2 % into left breast site to numb area . He then inserted [place] BLN 20ga x 7.5cm lot [id number] exp 02-. Patient tolerated well. Films taken. Gauze and paper tape over wire. 1959 [facility] from [place] arrived. Areolar area cleaned with chloraprep x 3. Dr. Carl Plascencia then injected filtered sulfur colloid injecttions x 4 of approx 1.5mCi for sentinel node imaging to areolar area at 0731 Area massaged and then dressing of gauze and paper tape applied. 1940 Patient assisted out of needle loc chair and into wheelchair. VS taken. Patient assisted to [place] via wheelchair. Tolerated procedure well.

## 2021-01-21 NOTE — H&P
UPDATED HISTORY AND PHYSICAL EXAMINATION    SERVICE DATE:  1/21/2021   SERVICE TIME:  7:51 AM    PHYSICAL EXAM MUST BE COMPLETED ON ADMISSION    The History and Physical (completed in the past 30 days) has been reviewed and the patient has been examined. The contents accurately reflect the patient's condition with the following additions or revisions since the H&P was completed. Examination indicates no changes. This H&P can be found in the Epic. The patient was counseled at length about the risks of anibal Covid-19 during their perioperative period and any recovery window from their procedure. The patient was made aware that anibal Covid-19  may worsen their prognosis for recovering from their procedure  and lend to a higher morbidity and/or mortality risk. All material risks, benefits, and reasonable alternatives including postponing the procedure were discussed. The patient does wish to proceed with the procedure at this time.     SIGNATURE: Cong Travis MD PATIENT NAME: Tonnie Hodgkin   DATE: 1/21/21 MRN: [id number]   TIME: 7:51 AM EST PHONE: [phone]

## 2021-01-21 NOTE — ANESTHESIA PROCEDURE NOTES
Peripheral Block    Patient location during procedure: pre-op  Start time: 1/21/2021 8:43 AM  End time: 1/21/2021 8:53 AM  Staffing  Performed: anesthesiologist   Anesthesiologist: Julius Leiva MD  Preanesthetic Checklist  Completed: patient identified, IV checked, site marked, risks and benefits discussed, surgical consent, monitors and equipment checked, pre-op evaluation, timeout performed, anesthesia consent given, oxygen available and patient being monitored  Peripheral Block  Patient position: supine  Prep: ChloraPrep  Patient monitoring: cardiac monitor, continuous pulse ox, frequent blood pressure checks and IV access  Block type: PECS I and PECS II  Laterality: left  Injection technique: single-shot  Guidance: nerve stimulator and ultrasound guided  Local infiltration: ropivacaine  Infiltration strength: 0.5 %  Dose: 30 mL  Provider prep: mask and sterile gloves (Sterile probe cover)  Local infiltration: ropivacaine  Needle  Needle type: combined needle/nerve stimulator   Needle gauge: 22 G  Needle length: 5 cm  Needle localization: anatomical landmarks and ultrasound guidance  Assessment  Injection assessment: negative aspiration for heme, no paresthesia on injection and local visualized surrounding nerve on ultrasound  Paresthesia pain: immediately resolved  Slow fractionated injection: yes  Hemodynamics: stable  Additional Notes  Ultrasound image printed and saved in patient chart.     Sterile probe cover used    Medications Administered  Ropivacaine (NAROPIN) injection 0.5%, 30 mL  Reason for block: post-op pain management and at surgeon's request

## 2021-01-21 NOTE — ANESTHESIA POSTPROCEDURE EVALUATION
Department of Anesthesiology  Postprocedure Note    Patient: Yonatan Walden  MRN: [id number]  YOB: 1955  Date of evaluation: 1/21/2021  Time:  11:01 AM     Procedure Summary     Date: 01/21/21 Room / Location: [address]    Anesthesia Start: 7017 Anesthesia Stop: 1100    Procedure: LEFT BREAST NEEDLE LOCALIZED LUMPECTOMY AND SENTINEL LYMPH NODE BIOPSY. (Left Breast) Diagnosis: (LEFT BREAST CANCER)    Surgeons: Fitz Shelley MD Responsible Provider: Ru Mac MD    Anesthesia Type: general ASA Status: 3          Anesthesia Type: general    Kellie Phase I: Kellie Score: 4    Kellie Phase II:      Last vitals: Reviewed and per EMR flowsheets.        Anesthesia Post Evaluation    Patient location during evaluation: bedside  Patient participation: waiting for patient participation  Level of consciousness: sleepy but conscious  Pain score: 0  Airway patency: patent  Nausea & Vomiting: no nausea and no vomiting  Complications: no  Cardiovascular status: blood pressure returned to baseline and hemodynamically stable  Respiratory status: acceptable, face mask and oral airway  Hydration status: euvolemic

## 2021-01-21 NOTE — ANESTHESIA PRE PROCEDURE
Department of Anesthesiology  Preprocedure Note       Name:  Deloris Ribeiro   Age:  72 y.o.  :  1955                                          MRN:  [id number]         Date:  2021      Surgeon: Cori Beasley):  Geoffrey Kinsey MD    Procedure: Procedure(s):  LEFT BREAST NEEDLE LOCALIZED LUMPECTOMY AND SENTINEL LYMPH NODE BIOPSY. Medications prior to admission:   Prior to Admission medications    Medication Sig Start Date End Date Taking? Authorizing Provider   Multiple Vitamins-Minerals (OCUVITE ADULT FORMULA PO) Take by mouth   Yes Historical Provider, MD   aspirin 81 MG tablet Take 81 mg by mouth daily   Yes Historical Provider, MD   Simethicone (GAS-X EXTRA STRENGTH) 125 MG CAPS Take 1 capsule by mouth 3 times daily   Yes Historical Provider, MD   Blood Glucose Monitoring Suppl (TRUE METRIX AIR GLUCOSE METER) w/Device KIT USE AS DIRECTED FOR CHECKING BLOOD SUGAR 4 TIMES DAILY. 20   Historical Provider, MD   TRUE METRIX BLOOD GLUCOSE TEST strip USE AS DIRECTED FOR CHECKING BLOOD SUGAR 4 TIMES DAILY. 20   Historical Provider, MD   TRUEplus Lancets 28G MISC USE AS DIRECTED FOR CHECKING BLOOD SUGAR 4 TIMES DAILY.  20   Historical Provider, MD   lidocaine-prilocaine (EMLA) 2.5-2.5 % cream Indications: apply topically to Left areola one hour prior to procedure Apply topically to left areola one hour prior to procedure 20   Geoffrey Kinsey MD   triamcinolone (KENALOG) 0.1 % cream Apply 1 Dose topically daily 10/23/20   Historical Provider, MD   diclofenac sodium (VOLTAREN) 1 % GEL Apply 2 g topically 4 times daily 20   Historical Provider, MD   augmented betamethasone dipropionate (DIPROLENE-AF) 0.05 % cream Apply 1 Dose topically 2 times daily 20   Historical Provider, MD   insulin lispro (HUMALOG) 100 UNIT/ML injection vial Inject into the skin 3 times daily (before meals) Indications: sliding scale 15 units TID    Historical Provider, MD ALPRAZolam (XANAX) 0.5 MG tablet Take 0.5 mg by mouth every 6 hours as needed for Sleep. Historical Provider, MD   mycophenolate (CELLCEPT) 500 MG tablet Take 1,000 mg by mouth 2 times daily    Historical Provider, MD   tacrolimus (PROGRAF) 0.5 MG capsule Take 0.5 mg by mouth 2 times daily    Historical Provider, MD   insulin glargine (LANTUS SOLOSTAR) 100 UNIT/ML injection pen Inject into the skin 2 times daily 45 units in the AM  55 at bedtime    Historical Provider, MD   omeprazole (PRILOSEC) 20 MG delayed release capsule Take 20 mg by mouth daily    Historical Provider, MD   doxycycline hyclate (VIBRA-TABS) 100 MG tablet Take 100 mg by mouth daily as needed     Historical Provider, MD   glimepiride (AMARYL) 4 MG tablet Take 4 mg by mouth every morning (before breakfast)    Historical Provider, MD   clindamycin-benzoyl peroxide (BENZACLIN) 1-5 % gel Apply topically nightly Apply topically 2 times daily. Historical Provider, MD   pravastatin (PRAVACHOL) 10 MG tablet Take 10 mg by mouth nightly    Historical Provider, MD   lisinopril (PRINIVIL;ZESTRIL) 5 MG tablet Take 5 mg by mouth daily    Historical Provider, MD   SITagliptin (JANUVIA) 100 MG tablet Take 100 mg by mouth daily    Historical Provider, MD   escitalopram (LEXAPRO) 20 MG tablet Take 20 mg by mouth daily    Historical Provider, MD   diphenhydrAMINE (BENADRYL) 25 MG capsule Take 25 mg by mouth nightly as needed for Itching (2-4 tabs at bedtime prn)    Historical Provider, MD   nystatin (MYCOSTATIN) 992691 UNIT/GM ointment Apply topically 2 times daily Apply topically 2 times daily. Historical Provider, MD   vitamin D (CHOLECALCIFEROL) 1000 UNIT TABS tablet Take 1,000 Units by mouth 2 times daily    Historical Provider, MD   tretinoin microspheres (RETIN-A MICRO) 0.1 % gel Apply topically nightly Apply topically nightly.     Historical Provider, MD scopolamine (TRANSDERM-SCOP, 1.5 MG,) transdermal patch Place 1 patch onto the skin every 72 hours    Historical Provider, MD       Current medications:    Current Facility-Administered Medications   Medication Dose Route Frequency Provider Last Rate Last Admin    ceFAZolin (ANCEF) 2000 mg in dextrose 3 % 50 mL IVPB (duplex)  2,000 mg Intravenous On Call to [address] [place] [address], MD        lactated ringers infusion   Intravenous Continuous Phyllistine MD Daina 100 mL/hr at 01/21/21 0811 New Bag at 01/21/21 1887       Allergies: Allergies   Allergen Reactions    Metformin      Other reaction(s): GI Upset    Metoclopramide      Other reaction(s): Intolerance  Teeth grinding    Naproxen     Nsaids Other (See Comments)     Other reaction(s):  Other: See Comments  S/p heart transplant; decreased renal function  S/p heart transplant; decreased renal function    Procaine Hives    Iodides Rash       Problem List:    Patient Active Problem List   Diagnosis Code    Ductal carcinoma in situ (DCIS) of left breast D05.12    H/O heart transplant ([facility] [address] Z94.1    Esophageal reflux K21.9    Hyperlipidemia E78.5    Hypertension I10    KARMEN (obstructive sleep apnea) G47.33    Type 2 diabetes mellitus without complication (HCC) B28.8    Breast neoplasm, Tis (DCIS), left D05.12    Abnormal mammogram of left breast R92.8    Carcinoma of upper-outer quadrant of left breast in female, estrogen receptor positive ([facility] [address] C50.412, Z17.0    Heart replaced by transplant ([facility] [address] Z94.1    Gastroparesis K31.84    Vitamin D deficiency E55.9    Senile sebaceous gland hyperplasia L73.8    PTTD (posterior tibial tendon dysfunction) M76.829    PVD (posterior vitreous detachment), bilateral H43.813    Plantar fasciitis M72.2    Papilloma of breast 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.9    Pain in left foot M79.672    Obesity, Class I, BMI 30-34.9 E66.9    Moderate episode of recurrent major depressive disorder (HCC) F33.1    Lumbago M54.5  Immunodeficiency due to treatment with immunosuppressive medication D84.821, Z79.899    History of cervical dysplasia Z87.410    Fatty infiltration of liver K76.0    Family history of malignant neoplasm of breast Z80.3    Facet arthropathy, lumbosacral M47.817    Bilateral pseudophakia Z96.1       Past Medical History:        Diagnosis Date    Anxiety     Depressive disorder     Ductal carcinoma in situ (DCIS) of left breast 2018    pTispNxpM   2 cm low grade negative margins ER strongly pos.  s/p partial mastectomy    Esophageal reflux     H/O heart transplant ([facility] [address]     for dilated cardiomyopathy     HPV (human papilloma virus) anogenital infection     Hyperlipidemia     Hypertension     Kidney stones     Lumbago     Multiple nevi     KARMEN (obstructive sleep apnea)     Osteoarthritis     Type 2 diabetes mellitus without complication ([facility]        Past Surgical History:        Procedure Laterality Date    BREAST LUMPECTOMY      CATARACT REMOVAL Bilateral     CHOLECYSTECTOMY      HEART TRANSPLANT      BEE STEROTACTIC LOC BREAST BIOPSY LEFT  2020    BEE STEROTACTIC LOC BREAST BIOPSY LEFT 2020 [facility] [place]       Social History:    Social History     Tobacco Use    Smoking status: Former Smoker     Packs/day: 10.00     Quit date: 1986     Years since quittin.0    Smokeless tobacco: Never Used   Substance Use Topics    Alcohol use: No     Comment: occ                                Counseling given: Not Answered      Vital Signs (Current):   Vitals:    21 0600   BP: 119/77   Pulse: 89   Resp: 18   Temp: 99.2 °F (37.3 °C)   TempSrc: Temporal   SpO2: 93%                                              BP Readings from Last 3 Encounters:   21 119/77   21 (!) 140/74   20 118/76       NPO Status: Time of last liquid consumption:                         Time of last solid consumption:  Date of last liquid consumption: 01/20/21                        Date of last solid food consumption: 01/20/21    BMI:   Wt Readings from Last 3 Encounters:   12/31/20 223 lb (101.2 kg)   12/29/20 223 lb (101.2 kg)   12/14/20 224 lb 3.2 oz (101.7 kg)     There is no height or weight on file to calculate BMI.    CBC:   Lab Results   Component Value Date    WBC 22.9 04/14/2013    RBC 5.75 04/14/2013    RBC 4.80 12/28/2011    HGB 17.3 04/14/2013    HCT 53.3 04/14/2013    MCV 92.8 04/14/2013    RDW 13.3 04/14/2013     04/14/2013       CMP:   Lab Results   Component Value Date     04/14/2013    K 4.7 04/14/2013     04/14/2013    CO2 21 04/14/2013    BUN 23 04/14/2013    CREATININE 1.25 04/14/2013    GFRAA 56.7 04/14/2013    LABGLOM 46.9 04/14/2013    GLUCOSE 266 04/14/2013    GLUCOSE 247 12/28/2011    PROT 7.4 04/14/2013    CALCIUM 10.0 04/14/2013    BILITOT 0.9 04/14/2013    ALKPHOS 99 04/14/2013    AST 27 04/14/2013    ALT 31 04/14/2013       POC Tests:   Recent Labs     01/21/21  0621   POCGLU 152*       Coags: No results found for: PROTIME, INR, APTT    HCG (If Applicable): No results found for: PREGTESTUR, PREGSERUM, HCG, HCGQUANT     ABGs: No results found for: PHART, PO2ART, ZFZ8JVZ, ZPA6KTZ, BEART, R7KNJHMD     Type & Screen (If Applicable):  No results found for: LABABO, LABRH    Drug/Infectious Status (If Applicable):  No results found for: HIV, HEPCAB    COVID-19 Screening (If Applicable):   Lab Results   Component Value Date    COVID19 Not Detected 01/15/2021         Anesthesia Evaluation  Patient summary reviewed and Nursing notes reviewed no history of anesthetic complications:   Airway: Mallampati: II  TM distance: >3 FB   Neck ROM: full  Mouth opening: > = 3 FB Dental: normal exam         Pulmonary:Negative Pulmonary ROS and normal exam    (+) sleep apnea:                             Cardiovascular:Negative CV ROS  Exercise tolerance: good (>4 METS), (+) hypertension:,       ECG reviewed               Beta Blocker:  Not on Beta Blocker      ROS comment: Heart transplant 2004     Neuro/Psych:   Negative Neuro/Psych ROS              GI/Hepatic/Renal: Neg GI/Hepatic/Renal ROS  (+) GERD:,           Endo/Other: Negative Endo/Other ROS   (+) DiabetesType II DM, , .          Pt had PAT visit. Abdominal:           Vascular: negative vascular ROS. Anesthesia Plan      general     ASA 3       Induction: intravenous. MIPS: Postoperative opioids intended and Prophylactic antiemetics administered. Anesthetic plan and risks discussed with patient. Plan discussed with CRNA.     Attending anesthesiologist reviewed and agrees with Pre Eval content              Ron Moran MD   1/21/2021

## 2021-01-21 NOTE — OP NOTE
OPERATIVE REPORT    LOG ID: [id number]  Surgery Date: 1/21/2021   Incision/Procedure Start Time:  0936  Incision Close/Procedure End Time:  10:45    Procedure Performed: Procedure(s):  LEFT BREAST NEEDLE LOCALIZED LUMPECTOMY AND SENTINEL LYMPH NODE BIOPSY. Surgeon(s)/Proceduralist(s) and Assistant(s): [facility]   First Assistant: Edgardo Palacios  Scrub Person First: Lala Marshall       Anesthesia: Anesthesiologist: Yovani Mcdonnell MD  CRNA: ALEX Lares CRNA   General   Block by Anesthesia    Pre-Operative Diagnosis: LEFT BREAST CANCER   Post-Operative Diagnosis: same    ESTIMATED BLOOD LOSS: Minimal, 1 cc. COMPLICATIONS: No complications. FINDINGS: clip in specimen (additional lateral margin     SPECIMEN: left breast mass; left sentinel lymph nodes; superior, medial, inferior, lateral, additional left breast margins were taken. DRAINS: No drains were placed. Venodynes placed preoperatively    PREOPERATIVE ANTIBIOTICS:  ANCEF 2 GRAMS     Cancer Staging  Breast neoplasm, Tis (DCIS), left  Staging form: Breast, AJCC 8th Edition  - Clinical stage from 3/5/2018: Stage 0 (cTis (DCIS), cN0, cM0, ER+) - Signed by Rae Isaac MD on 12/2/2020    Carcinoma of upper-outer quadrant of left breast in female, estrogen receptor positive ([facility] [address]  Staging form: Breast, AJCC 8th Edition  - Clinical stage from 12/14/2020: Stage IA (rcT1, cN0, cM0, G2, ER+, MS+, HER2-) - Signed by Rae Isaac MD on 1/4/2021        INDICATIONS:  Mal Guerra is a 72 y.o.  female who presented with a left breast imaging abnormality. She underwent a minimally invasive   biopsy that showed cancer. I recommended  left needle localized lumpectomy and sentinel lymph node biopsy and possible axillary dissection. She understood the risks and benefits of procedure and consented on the day of surgery. SIGNATURE: Néstor Raymundo MD PATIENT NAME: Xiomy Remy   DATE: 1/21/21 MRN: [id number]   TIME: 11:02 AM EST PHONE:  [phone]

## 2021-01-22 ENCOUNTER — TELEPHONE (OUTPATIENT)
Dept: SURGERY | Age: 66
End: 2021-01-22

## 2021-01-22 NOTE — TELEPHONE ENCOUNTER
I called the patient post Left Breast Needle LOC Lumpectomy and SLNB. The Patient verbalized a pain level ,\"3\", and stated that she took Tylenol at about 9:00 or 10:00 AM with good effect for a ,\"poking type pain sensation\". The Patient encouraged to use ice packs per post operative orders. The patient requested that I re-review post operative instructions, r/t surgery was rescheduled d/t COVID, and it's been a few weeks since the post operative teaching session. I reviewed post operative instructions with the patient and verified date/time/location of post operative appointment. The Patient has no further questions at this time.

## 2021-01-28 ENCOUNTER — TELEPHONE (OUTPATIENT)
Dept: SURGERY | Age: 66
End: 2021-01-28

## 2021-01-28 NOTE — TELEPHONE ENCOUNTER
I notified the patient of her Left Breast Needle LOC Lumpectomy and SLNB pathology results. The Patient aware of her clear surgical margins. The Patient verbalized understanding of her pathology results and has no questions at this time.

## 2021-01-28 NOTE — TELEPHONE ENCOUNTER
----- Message from Angela Lim MD sent at 1/28/2021  7:54 AM EST -----  Regarding: call with path, clear margins, no oncotype bc so small    ----- Message -----  From: Catherine Ching Incoming Lab Results From Soft  Sent: 1/27/2021   4:10 PM EST  To: Angela Lim MD

## 2021-02-08 ENCOUNTER — VIRTUAL VISIT (OUTPATIENT)
Dept: SURGERY | Age: 66
End: 2021-02-08
Payer: MEDICARE

## 2021-02-08 ENCOUNTER — TELEPHONE (OUTPATIENT)
Dept: SURGERY | Age: 66
End: 2021-02-08

## 2021-02-08 DIAGNOSIS — N64.9 BREAST DISORDER: ICD-10-CM

## 2021-02-08 DIAGNOSIS — C50.412 CARCINOMA OF UPPER-OUTER QUADRANT OF LEFT BREAST IN FEMALE, ESTROGEN RECEPTOR POSITIVE (HCC): Primary | ICD-10-CM

## 2021-02-08 DIAGNOSIS — Z17.0 CARCINOMA OF UPPER-OUTER QUADRANT OF LEFT BREAST IN FEMALE, ESTROGEN RECEPTOR POSITIVE (HCC): Primary | ICD-10-CM

## 2021-02-08 PROCEDURE — 99024 POSTOP FOLLOW-UP VISIT: CPT | Performed by: SURGERY

## 2021-02-08 NOTE — TELEPHONE ENCOUNTER
I called patient to schedule her 6m mammogram and follow up. Patient states that she had some bumps by her left nipple where she had received the some injections. She states she can't feel them today, and thinks they will go away, but she forgot to mention to Dr. Alicia Phillip during her appointment today.     Please advise.    (patient would like response via wiseri)

## 2021-02-08 NOTE — PROGRESS NOTES
POSTOPERATIVE NOTE    PATIENT:  Shannan Kiran     DATE:     2/8/2021     TIME: 10:20 AM EST     HISTORY AND CHIEF COMPLAINT:    Shannan Kiran  is a 72y.o. year old  Female  status post  Left lumpectomy and sln    The pathology showed clear margins. PHYSICAL EXAMINATION:       Shannan Kiran  is a 72y.o. year old  Female in no acute distress, alert and oriented x 3. Incision: clean, dry, intact    IMPRESSION:    Status post left lumpectomy and sln    PLAN:    Patient is doing well. Recommend clinical breast exams in the breast surgical suite in 6 month(s)  Mammography in 6 month(s)  Recommend an active lifestyle, healthy diet, limited alcohol intake, achieve and maintain a healthy BMI to optimize breast cancer outcomes / decrease risk of breast cancer.   Follow up with medical oncology  Follow up with radiation oncology  Follow up with PCP    Dictated by:  Alan Fuchs MD 2/8/2021 10:20 AM EST

## 2021-02-18 ENCOUNTER — HOSPITAL ENCOUNTER (OUTPATIENT)
Dept: RADIATION ONCOLOGY | Age: 66
Discharge: HOME | End: 2021-02-18
Attending: RADIOLOGY
Payer: MEDICARE

## 2021-02-18 ENCOUNTER — HOSPITAL ENCOUNTER (OUTPATIENT)
Dept: RADIATION ONCOLOGY | Age: 66
Discharge: HOME | End: 2021-02-18
Payer: MEDICARE

## 2021-02-18 VITALS
DIASTOLIC BLOOD PRESSURE: 82 MMHG | WEIGHT: 223 LBS | BODY MASS INDEX: 31.54 KG/M2 | HEART RATE: 106 BPM | SYSTOLIC BLOOD PRESSURE: 125 MMHG | RESPIRATION RATE: 16 BRPM | TEMPERATURE: 97 F

## 2021-02-18 PROCEDURE — 99212 OFFICE O/P EST SF 10 MIN: CPT | Performed by: RADIOLOGY

## 2021-02-18 PROCEDURE — 77334 RADIATION TREATMENT AID(S): CPT | Performed by: RADIOLOGY

## 2021-02-18 PROCEDURE — 77290 THER RAD SIMULAJ FIELD CPLX: CPT | Performed by: RADIOLOGY

## 2021-02-18 NOTE — CONSULTS
NURSING ASSESSMENT     Date: 2021        Patient Name: Ariel Olvera     YOB: 1955      Age:  72 y.o. MRN: [id number]     Chaperone [] Yes   [x] No      Advance Directives:   Do you currently have completed advance directives (living will)? [x] Yes   [] No         *If yes, please bring us a copy for your records. *If no, would you like info or assistance in completing advance directives (living will)? [] Yes   [x] No    Pain Score:   Pain Score (1-10): none     General: Fatigue  Patient has gained weight [] Yes   [x] No  Patient has lost weight [] Yes   [x] No  How much weight in pounds and over what length of time:     Eyes (Ophthalmic): No Problem     Skin (Dermatological): dry skin     ENT: scratchy throat all the time     Respiratory: ALONZO     Cardiovascular: Edema feet on occasion      Device   [] Yes   [x] No   Copy of Card Obtained [] Yes   [x] No    Gastrointestinal: No Problems    Genito-Urinary: No Problems, recently passed a kidney stone     Breast: Changes left breast lumpectomy done 21     Musculoskeletal: Joint Pain and Back Pain chronic    Neurological: Numbness and Tingling feet sometimes      Hematological and Lymphatic: No Problems     Endocrine: Diabetes Mellitus, Hgba1c 7    Gyn History:   /Para: 0/0  Age of Menarche: 6  Age of Menopause 52's  Vaginal Bleeding:  none  First Pregnancy:  Breast Feeding:    Last Menstrual period:   Oral Contraceptives: yes     Number of years: 5  Hormone Replacement therapy none     Number of Years:   Last Pap Smear:   Last Mammogram    A 10-point review of systems has been conducted and pertinent positives have been   recorded.  All other review of systems are negative    Was the patient admitted during the course of treatment OR within 30 days of treatment? n/a    Additional Comments: saw Dr Lenore Quick today

## 2021-02-18 NOTE — H&P
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 0/4 SLN. neg margins. ERPR+ 99/98  Her2- , Likely local recurrence of DCIS diagnosed 2019 a/w OncotypeDCIS score 0. Dear Dr Sujata Head,     CURRENT COMPLAINT: Here today for adjuvant radiation recommendations    INTERVAL HISTORY:   Ivett Alcaraz is a  postmenopausal female who has been followed closely with mammography +/- US every 6 months of the L breast for an abnormality thought to be probably benign since 2015. In November 2016 there was a 0.3 x 0.5 x 0.5 oval mass in the left breast seen and biopsy was recommended, but the patient declined.  On 6/13/18 repeat mammography once again demonstrated likely benign findings and short term follow up was once again recommended, however on 11/28/18 the left breast mass was slightly larger (0.6cm) and biopsy was recommended. On 11/28/17 left breast biopsy at 12 o'clock 9 cm from the nipple demonstrated intraductal papilloma with focal ADH. Left breast lumpectomy on 1/20/18 at [facility] by Dr Amelia Damico at [facility] revealed a low grade solid and cribiform DCIS arising from intraductal papilloma with close margins. 0.9 cm ER 90%. The area underwent reexcision on 2/19/18 and only fat necrosis and fibrocystic changes with florid ductal hyperplasia was seen.      The patient saw Dr Joe Bryan and discussed aromatase inhibitor therapy. Per Dr Lozada Grain note, the patient researched the drug and declined therapy, however the patient states that Dr Joe Bryan did not recommend therapy as the relative benefit was small. She is here today to discuss adjuvant radiotherapy.  She had a cardiac transplant in 2004 for dilated cardiomyopathy and mitral valve insufficiency.    Oncotype DCIS score was ordered to help with radiation decision making. The score has returned as 0 (ER 10.7 UT >10) corresponding with 5% any breast recurrence risk (ICD and DCIS) and 4% IDC risk following surgery alone (without radiation). I discussed the report and the significance with the patient and her  today. She had decided for close followup and forego the radiation.      Since Spring 2018 she has been under close surveillence with self breast exams and q 6 month left mammography. She has transferred her mammograms to  [address] [place] [address]. Mammogram 5/21/20 showed benign findings but 11/23/20 showed suspicious area in the L breast    BEE DIGITAL DIAGNOSTIC W OR WO CAD BILATERAL, US BREAST LIMITED LEFT:11/23/2020       CLINICAL HISTORY:D05.12 Ductal carcinoma in situ (DCIS) of left breast ICD10.       COMPARISONS:   11/15/2019, 10/11/2018, 11/28/2017.       TECHNIQUE:  Full field routine digital mammograms were obtained bilaterally.  3D breast tomosynthesis was also performed.        Directed ultrasound of the left breast was performed, with a regional survey.        CAD analysis was performed and used in the interpretation.           FINDINGS-           An approximately 1 cm partially obscured irregular density within the upper-outer quadrant of the left breast at a posterior depth is best visualized on the 3-D breast tomosynthesis images.       Directed ultrasound demonstrates an approximately 6 x 5 mm irregular hypoechoic nodule at the 1:00 position approximately 7 cm from the nipple, which may correspond to the mammographic area of concern.       However, biopsy using 3D breast tomosynthesis guidance is suggested.       Scattered fibroglandular densities are noted with otherwise stable asymmetry and postoperative changes on the left.                   Impression       BI-RADS 4: SUSPICIOUS FINDING--BIOPSY SHOULD BE CONSIDERED. She was sent to Dr Sujata Head for further workup and treatment. Stereotactic core bx on 12/9/20 showed  ADDENDUM DIAGNOSIS:   LEFT BREAST CORE BIOPSY:   INVASIVE CARCINOMA WITH MIXED DUCTAL AND LOBULAR FEATURES         PROCEDURE: NEEDLE BIOPSY   SPECIMEN LATERALITY: LEFT   TUMOR SITE: UPPER OUTER QUADRANT   TUMOR SIZE: GREATEST DIMENSION OF LARGEST INVASIVE FOCUS: 3MM   HISTOLOGIC TYPE: INVASIVE CARCINOMA WITH MIXED DUCTAL AND LOBULAR   FEATURES   HISTOLOGIC GRADE: GLANDULAR DIFFERENTIATION SCORE 3, NUCLEAR PLEOMORPHISM   SCORE 2, MITOTIC RATE SCORE 1.  OVERALL GRADE: GRADE 2   DUCTAL CARCINOMA IN SITU: PRESENT. ARCHITECTURAL PATTERNS: CRIBRIFORM,   SOLID.  NUCLEAR GRADE: GRADE 2 (INTERMEDIATE), NECROSIS: PRESENT, FOCAL   LYMPHOVASCULAR INVASION: NOT IDENTIFIED   MICROCALCIFICATIONS: PRESENT IN DCIS AND INVASIVE CARCINOMA        ESTROGEN/PROGESTERONE RECEPTORS:     ANTIBODY:  ER     %TUMOR STAINING/INTENSITY:  99%/STRONG INTENSITY STAINING      INTERPRETATION:  POSITIVE   ANTIBODY:  [place]     %TUMOR STAINING/INTENSITY:  98%/STRONG INTENSITY STAINING      INTERPRETATION:  POSITIVE     HER-2 STUDIES:  HER2 NEGATIVE BY IHC (SCORE 0) AND HER2 NEGATIVE BY FISH     She underwent lumpectomy and SLN evaluation. Final pathology showed: FINAL DIAGNOSIS:   A.  LEFT BREAST-   NO EVIDENCE OF INVASIVE CARCINOMA. FOCAL LOW-GRADE DUCTAL CARCINOMA IN-SITU   FOCAL FLORID-USUAL DUCTAL EPITHELIAL HYPERPLASIA   ALL MARGINS ARE FREE OF INVOLVEMENT. BJocelyn Curly MARGIN-   NO EVIDENCE OF CARCINOMA. FIBROCYSTIC CHANGES AND ADENOSIS. CJocelyn Curly BREAST TISSUE-   INVASIVE DUCTAL CARCINOMA (3MM) AND DUCTAL CARCINOMA IN-SITU, NOT AT NEW   MARGIN. IMMUNOSTAIN P63 AND MYOSIN (WITH APPROPRIATE CONTROLS) ARE NEGATIVE FOR   MYOEPITHELIAL CELL LAYER IN BLOCK C3, SUPPORTING ABOVE DIAGNOSIS. D.  SUPERIOR MARGIN-   NO EVIDENCE OF CARCINOMA. E.  MEDIAL MARGIN-   NO EVIDENCE OF CARCINOMA.      [facility]id[facility]io [facility]- NO EVIDENCE OF CARCINOMA. Heriberto Lathe LYMPH NODES-   FOUR LYMPH NODES NEGATIVE FOR METASTATIC CARCINOMA (0/4). IMMUNOSTAIN CK7 PERFORMED ONLY ON BLOCK G1 AND G2, NEGATIVE FOR TUMOR   CELLS IN THE PRESENCE OF SATISFACTORY CONTROLS. COMMENTS: DR. Carol Kinsey HAS REVIEWED ONLY SLIDES A2, A6 & PART C ALONG   WITH CHECKLIST OF THIS CASE AND CONCURS. CAP BREAST INVASIVE TUMOR CHECKLIST:   PROCEDURE: EXCISION (LUMPECTOMY)   LATERALITY: LEFT   TUMOR SIZE: 3 MM GREATEST DIMENSION   HISTOLOGIC TYPE: INVASIVE DUCTAL CARCINOMA   HISTOLOGIC GRADE (SUDHIR HISTOLOGIC SCORE):   GLANDULAR (ACINAR/TUBULAR DIFFERENTIATION): SCORE 2   NUCLEAR PLEOMORPHISM: SCORE 2   MITOTIC RATE: SCORE 2   OVERALL GRADE: GRADE 2 (SCORE OF 6)   DUCTAL CARCINOMA IN SITU: PRESENT   INVASIVE CARCINOMA MARGINS - UNINVOLVED BY INVASIVE CARCINOMA   DISTANCE FROM CLOSEST MARGIN: 8 MM   SPECIFY CLOSEST MARGIN: LATERAL   DCIS MARGINS: UNINVOLVED BY DCIS   DISTANCE FROM CLOSEST MARGIN: 8 MM   SPECIFY CLOSEST MARGIN: LATERAL   REGIONAL LYMPH NODES: UNINVOLVED BY TUMOR CELLS   NUMBER OF LYMPH NODES EXAMINED:  4   NUMBER OF SENTINEL NODES EXAMINED:  4   TREATMENT EFFECTS:  NO KNOW PRESURGICAL THERAPY   LYMPHOVASCULAR INVASION-NOT IDENTIFIED   PATHOLOGIC STAGE (TNM, [facility] 8TH EDITION)     PRIMARY TUMOR (PT): PT1A     REGIONAL LYMPH NODES (PSN): PSN0     DISTANT METASTASIS (PM): PMX     BREAST BIOMARKER TESTING     PERFORMED ON THE PREVIOUS BIOPSY (S-).  PLEASE SEE PREVIOUS   BIOPSY REPORT FOR COMPLETE DETAILS. ER (POSITIVE 99%)   TN (POSITIVE 98%)   HER-2/ANSLEY: NEGATIVE ([facility] SCORE 0) . She is healed and here today for adjuvant raditaion recommendations.        PAST MEDICAL HISTORY:   Past Medical History:   Diagnosis Date    Anxiety     Cancer [facility])     breast, left    Depressive disorder     Ductal carcinoma in situ (DCIS) of left breast 01/30/2018 pTispNxpM   2 cm low grade negative margins ER strongly pos.  s/p partial mastectomy    Esophageal reflux     H/O heart transplant ([facility] [address] 2004    for dilated cardiomyopathy     HPV (human papilloma virus) anogenital infection     Hyperlipidemia     Hypertension     Kidney stones     Lumbago     Multiple nevi     KARMEN (obstructive sleep apnea)     Osteoarthritis     Type 2 diabetes mellitus without complication ([facility] [address]        PAST SURGICAL HISTORY:  Past Surgical History:   Procedure Laterality Date    BREAST BIOPSY Left 1/21/2021    LEFT BREAST NEEDLE LOCALIZED LUMPECTOMY AND SENTINEL LYMPH NODE BIOPSY. performed by Carlos Marquez MD at [address] [facility] LUMPECTOMY Left     CATARACT REMOVAL Bilateral     CHOLECYSTECTOMY  2011    HEART TRANSPLANT  2004    BEE STEROTACTIC LOC BREAST BIOPSY LEFT  12/9/2020    BEE STEROTACTIC LOC BREAST BIOPSY LEFT 12/9/2020 [facility]       ALLERGIES:   Allergies   Allergen Reactions    Metformin      Other reaction(s): GI Upset    Metoclopramide      Other reaction(s): Intolerance  Teeth grinding    Naproxen     Nsaids Other (See Comments)     Other reaction(s): Other: See Comments  S/p heart transplant; decreased renal function  S/p heart transplant; decreased renal function    Procaine Hives    Iodides Rash        I have personally reviewed and reconciled the allergies listed. CURRENT MEDICATIONS:   Prior to Admission medications    Medication Sig Start Date End Date Taking? Authorizing Provider   Blood Glucose Monitoring Suppl (TRUE METRIX AIR GLUCOSE METER) w/Device KIT USE AS DIRECTED FOR CHECKING BLOOD SUGAR 4 TIMES DAILY. 12/18/20   Historical Provider, MD   TRUE METRIX BLOOD GLUCOSE TEST strip USE AS DIRECTED FOR CHECKING BLOOD SUGAR 4 TIMES DAILY. 12/18/20   Historical Provider, MD   TRUEplus Lancets 28G MISC USE AS DIRECTED FOR CHECKING BLOOD SUGAR 4 TIMES DAILY.  12/18/20   Historical Provider, MD Multiple Vitamins-Minerals (OCUVITE ADULT FORMULA PO) Take by mouth    Historical Provider, MD   lidocaine-prilocaine (EMLA) 2.5-2.5 % cream Indications: apply topically to Left areola one hour prior to procedure Apply topically to left areola one hour prior to procedure 12/29/20   Dayna Dorado MD   triamcinolone (KENALOG) 0.1 % cream Apply 1 Dose topically daily 10/23/20   Historical Provider, MD   diclofenac sodium (VOLTAREN) 1 % GEL Apply 2 g topically 4 times daily 9/17/20   Historical Provider, MD   augmented betamethasone dipropionate (DIPROLENE-AF) 0.05 % cream Apply 1 Dose topically 2 times daily 12/1/20   Historical Provider, MD   insulin lispro (HUMALOG) 100 UNIT/ML injection vial Inject into the skin 3 times daily (before meals) Indications: sliding scale 15 units TID    Historical Provider, MD   aspirin 81 MG tablet Take 81 mg by mouth daily    Historical Provider, MD   ALPRAZolam (XANAX) 0.5 MG tablet Take 0.5 mg by mouth every 6 hours as needed for Sleep. Historical Provider, MD   mycophenolate (CELLCEPT) 500 MG tablet Take 1,000 mg by mouth 2 times daily    Historical Provider, MD   tacrolimus (PROGRAF) 0.5 MG capsule Take 0.5 mg by mouth 2 times daily    Historical Provider, MD   insulin glargine (LANTUS SOLOSTAR) 100 UNIT/ML injection pen Inject into the skin 2 times daily 45 units in the AM  55 at bedtime    Historical Provider, MD   omeprazole (PRILOSEC) 20 MG delayed release capsule Take 20 mg by mouth daily    Historical Provider, MD   doxycycline hyclate (VIBRA-TABS) 100 MG tablet Take 100 mg by mouth daily as needed     Historical Provider, MD   glimepiride (AMARYL) 4 MG tablet Take 4 mg by mouth every morning (before breakfast)    Historical Provider, MD   clindamycin-benzoyl peroxide (BENZACLIN) 1-5 % gel Apply topically nightly Apply topically 2 times daily.     Historical Provider, MD   pravastatin (PRAVACHOL) 10 MG tablet Take 10 mg by mouth nightly    Historical Provider, MD lisinopril (PRINIVIL;ZESTRIL) 5 MG tablet Take 5 mg by mouth daily    Historical Provider, MD   SITagliptin (JANUVIA) 100 MG tablet Take 100 mg by mouth daily    Historical Provider, MD   escitalopram (LEXAPRO) 20 MG tablet Take 20 mg by mouth daily    Historical Provider, MD   diphenhydrAMINE (BENADRYL) 25 MG capsule Take 25 mg by mouth nightly as needed for Itching (2-4 tabs at bedtime prn)    Historical Provider, MD   nystatin (MYCOSTATIN) 393397 UNIT/GM ointment Apply topically 2 times daily Apply topically 2 times daily. Historical Provider, MD   vitamin D (CHOLECALCIFEROL) 1000 UNIT TABS tablet Take 1,000 Units by mouth 2 times daily    Historical Provider, MD   tretinoin microspheres (RETIN-A MICRO) 0.1 % gel Apply topically nightly Apply topically nightly. Historical Provider, MD   scopolamine (TRANSDERM-SCOP, 1.5 MG,) transdermal patch Place 1 patch onto the skin every 72 hours    Historical Provider, MD   Simethicone (GAS-X EXTRA STRENGTH) 125 MG CAPS Take 1 capsule by mouth 3 times daily    Historical Provider, MD       I have personally reviewed and reconciled the medications listed. FAMILY HISTORY:   Family History   Problem Relation Age of Onset    Breast Cancer Paternal Grandmother     Cancer Paternal Grandmother        SOCIAL HISTORY:   Social History     Tobacco Use   Smoking Status Former Smoker    Packs/day: 10.00    Quit date: 1986    Years since quittin.1   Smokeless Tobacco Never Used     Social History     Substance and Sexual Activity   Alcohol Use No    Comment: occ       Review Of Systems:  Pain Score:   Pain Score (1-10): none     General: Fatigue  Patient has gained weight []? Yes   [x]? No  Patient has lost weight []? Yes   [x]? No  How much weight in pounds and over what length of time:      Eyes (Ophthalmic):  No Problem                Skin (Dermatological): dry skin                ENT: scratchy throat all the time                Respiratory: ALONZO Cardiovascular: Edema feet on occasion                            Device   []? Yes   [x]? No              Copy of Card Obtained []? Yes   [x]? No     Gastrointestinal: No Problems     Genito-Urinary: No Problems, recently passed a kidney stone                Breast: Changes left breast lumpectomy done 21                Musculoskeletal: Joint Pain and Back Pain chronic     Neurological: Numbness and Tingling feet sometimes                            Hematological and Lymphatic: No Problems                Endocrine: Diabetes Mellitus, Hgba1c 7     Gyn History:   /Para: 0/0  Age of Menarche: 6  Age of Menopause 50's      A 10-point review of systems has been conducted and pertinent positives have been   recorded. All other review of systems are negative. ECOG PERFORMANCE STATUS: 1    VITAL SIGNS:   Vitals:    21 1126   BP: 125/82   Pulse: 106   Resp: 16   Temp: 97 °F (36.1 °C)   Weight: 223 lb (101.2 kg)       PHYSICAL EXAMINATION:  Constitutional: No acute distress.  awake, alert, cooperative    HEENT:  Normocephalic, without obvious abnormality, atraumatic, EOMI, external ears without lesions, oral pharynx with moist mucus membranes, orophayrnx clear, mucous membranes moist    NECK:  Supple, without supraclavicular or cervical adenopathy, thyroid symmetric, not enlarged    CARDIOVASCULAR:  Normal apical impulse, regular rate and rhythm, normal S1 and S2, no S3 or S4, and no murmur noted    CHEST/BREASTS:  Breasts relatively symmetrical with well healed L breast incision, some residual bruising, otherwise skin without lesion(s), no nipple retraction or dimpling, no nipple discharge, no masses palpated, no axillary or supraclavicular adenopathy    LUNGS:  No increased work of breathing, good air exchange, clear to auscultation bilaterally, no crackles or wheezing    ABDOMEN:  Nontender, nondistended, normal bowel sounds, soft, no masses, no hepatosplenomegally EXTREMITIES: NO cyanosis clubbing or lymphedema    MUSCULOSKELETAL: No bony tenderness along the hips ribs or spine. Motor strength is 5 out of 5 all extremities bilaterally. Tone is normal.    NEUROLOGIC:  Awake, alert, oriented x 3. Nonfocal    SKIN:  no bruising or bleeding, normal skin color, texture, turgor, no redness, warmth, or swelling, no rashes, no lesions, no abnormal moles and no jaundice      STUDIES: I have personally reviewed the imaging, laboratory, and pathology studies as previously described. IMPRESSION/PLAN:    Stage 1A [id number] L breast G2 3mm IDC with lobular features + G2 cribiform DCIS focal necrosis. 0/4 SLN. neg margins. ERPR+ 99/98  Her2- , Likely local recurrence of DCIS diagnosed 2019 a/w OncotypeDCIS score 0. Adjuvant radiation recommended for this patient. She is a candidate for FASTFORWARD approach 5.7Gy x 5 FX qod. Will take extra care to keep off heart with daily imaging given prior heart transplant. Also discussed COVID vaccine and encouraged her to receive. [facility] appt to evaluate for possible endocrine therapy. Thank you for allowing us to participate in the care of this patient.   Sincerely,    Electronically signed by Jose Gutierrez MD on 2/18/21 at 11:37 AM EST      cc:   No att. providers found  Duane Berry, DO Merced Modest, MD  [address] [place] [address]

## 2021-02-22 PROCEDURE — 77300 RADIATION THERAPY DOSE PLAN: CPT | Performed by: RADIOLOGY

## 2021-02-22 PROCEDURE — 77334 RADIATION TREATMENT AID(S): CPT | Performed by: RADIOLOGY

## 2021-02-22 PROCEDURE — 77295 3-D RADIOTHERAPY PLAN: CPT | Performed by: RADIOLOGY

## 2021-02-24 ENCOUNTER — HOSPITAL ENCOUNTER (OUTPATIENT)
Dept: RADIATION ONCOLOGY | Age: 66
Discharge: HOME | End: 2021-02-24
Attending: RADIOLOGY
Payer: MEDICARE

## 2021-02-24 PROCEDURE — 77412 RADIATION TX DELIVERY LVL 3: CPT | Performed by: RADIOLOGY

## 2021-02-24 PROCEDURE — 77280 THER RAD SIMULAJ FIELD SMPL: CPT | Performed by: RADIOLOGY

## 2021-02-24 PROCEDURE — 77387 GUIDANCE FOR RADJ TX DLVR: CPT | Performed by: RADIOLOGY

## 2021-02-26 ENCOUNTER — APPOINTMENT (OUTPATIENT)
Dept: RADIATION ONCOLOGY | Age: 66
End: 2021-02-26
Attending: RADIOLOGY
Payer: MEDICARE

## 2021-03-01 ENCOUNTER — HOSPITAL ENCOUNTER (OUTPATIENT)
Dept: RADIATION ONCOLOGY | Age: 66
Discharge: HOME | End: 2021-03-01
Attending: RADIOLOGY
Payer: MEDICARE

## 2021-03-01 PROCEDURE — 77412 RADIATION TX DELIVERY LVL 3: CPT | Performed by: RADIOLOGY

## 2021-03-01 PROCEDURE — 99214 OFFICE O/P EST MOD 30 MIN: CPT | Performed by: RADIOLOGY

## 2021-03-01 PROCEDURE — 77417 THER RADIOLOGY PORT IMAGE(S): CPT | Performed by: RADIOLOGY

## 2021-03-01 PROCEDURE — 77387 GUIDANCE FOR RADJ TX DLVR: CPT | Performed by: RADIOLOGY

## 2021-03-03 ENCOUNTER — HOSPITAL ENCOUNTER (OUTPATIENT)
Dept: RADIATION ONCOLOGY | Age: 66
Discharge: HOME | End: 2021-03-03
Attending: RADIOLOGY
Payer: MEDICARE

## 2021-03-03 PROCEDURE — 77412 RADIATION TX DELIVERY LVL 3: CPT | Performed by: RADIOLOGY

## 2021-03-03 PROCEDURE — 77387 GUIDANCE FOR RADJ TX DLVR: CPT | Performed by: RADIOLOGY

## 2021-03-05 ENCOUNTER — HOSPITAL ENCOUNTER (OUTPATIENT)
Dept: RADIATION ONCOLOGY | Age: 66
Discharge: HOME | End: 2021-03-05
Attending: RADIOLOGY
Payer: MEDICARE

## 2021-03-05 PROCEDURE — 77387 GUIDANCE FOR RADJ TX DLVR: CPT | Performed by: RADIOLOGY

## 2021-03-05 PROCEDURE — 77412 RADIATION TX DELIVERY LVL 3: CPT | Performed by: RADIOLOGY

## 2021-03-08 ENCOUNTER — HOSPITAL ENCOUNTER (OUTPATIENT)
Dept: RADIATION ONCOLOGY | Age: 66
Discharge: HOME | End: 2021-03-08
Attending: RADIOLOGY
Payer: MEDICARE

## 2021-03-08 PROCEDURE — 77336 RADIATION PHYSICS CONSULT: CPT | Performed by: RADIOLOGY

## 2021-03-08 PROCEDURE — 77387 GUIDANCE FOR RADJ TX DLVR: CPT | Performed by: RADIOLOGY

## 2021-03-08 PROCEDURE — 77412 RADIATION TX DELIVERY LVL 3: CPT | Performed by: RADIOLOGY

## 2021-03-15 ENCOUNTER — HOSPITAL ENCOUNTER (OUTPATIENT)
Dept: RADIATION ONCOLOGY | Age: 66
Discharge: HOME | End: 2021-03-15
Attending: RADIOLOGY
Payer: MEDICARE

## 2021-03-15 VITALS
HEART RATE: 95 BPM | SYSTOLIC BLOOD PRESSURE: 120 MMHG | OXYGEN SATURATION: 96 % | BODY MASS INDEX: 31.12 KG/M2 | TEMPERATURE: 97 F | WEIGHT: 220 LBS | DIASTOLIC BLOOD PRESSURE: 67 MMHG | RESPIRATION RATE: 16 BRPM

## 2021-03-15 PROCEDURE — 99213 OFFICE O/P EST LOW 20 MIN: CPT | Performed by: RADIOLOGY

## 2021-03-15 NOTE — H&P
RADIATION FOLLOW UP NOTE  DATE OF VISIT: 3/15/2021    DIAGNOSIS & STAGING: Stage 1A [id number] L breast G2 3mm IDC with lobular features + G2 cribiform DCIS focal necrosis. 0/4 SLN. neg margins. ERPR+ 99/98  Her2-    PREVIOUS TREATMENT:    Treatment Dates:                    2/24, 3/1, 3/3, 3/5, 3/8/21     Site: Dose: Total # fractions: Dose per fraction: Energy: Technique   L breast 28.5 Gy 5 QOD 5.7 Gy 10/18 MV photons Opposed tangents      Concurrent therapy:             none     Technique:                 FAST FORWARD fractionation     CURRENT COMPLAINT: Here for skin check and routine followup    INTERVAL HISTORY: Gautam Kang complains of some breast pain. She has an area of under the breast that is raw and tender. Starts endocrine therapy 4/1    PAST MEDICAL HISTORY:   Past Medical History:   Diagnosis Date    Anxiety     Cancer [facility])     breast, left    Depressive disorder     Ductal carcinoma in situ (DCIS) of left breast 01/30/2018    pTispNxpM   2 cm low grade negative margins ER strongly pos.  s/p partial mastectomy    Esophageal reflux     H/O heart transplant ([address] 2004    for dilated cardiomyopathy     HPV (human papilloma virus) anogenital infection     Hyperlipidemia     Hypertension     Kidney stones     Lumbago     Multiple nevi     KARMEN (obstructive sleep apnea)     Osteoarthritis     Type 2 diabetes mellitus without complication ([address]        PAST SURGICAL HISTORY:  Past Surgical History:   Procedure Laterality Date    BREAST BIOPSY Left 1/21/2021    LEFT BREAST NEEDLE LOCALIZED LUMPECTOMY AND SENTINEL LYMPH NODE BIOPSY.  performed by Angela Lim MD at [address] LUMPECTOMY Left     CATARACT REMOVAL Bilateral     CHOLECYSTECTOMY  2011    HEART TRANSPLANT  2004    BEE STEROTACTIC LOC BREAST BIOPSY LEFT  12/9/2020    BEE STEROTACTIC LOC BREAST BIOPSY LEFT 12/9/2020 [facility]       ALLERGIES:   Allergies   Allergen Reactions    Metformin      Other topically nightly. Historical Provider, MD CRUZ have personally reviewed and reconciled the medications listed. FAMILY HISTORY:   Family History   Problem Relation Age of Onset    Breast Cancer Paternal Grandmother     Cancer Paternal Grandmother        SOCIAL HISTORY:   Social History     Tobacco Use   Smoking Status Former Smoker    Packs/day: 10.00    Quit date: 1986    Years since quittin.2   Smokeless Tobacco Never Used     Social History     Substance and Sexual Activity   Alcohol Use No    Comment: occ       Review Of Systems:     Pain Score:   Pain Score (1-10): 7  Pain Location: under left breast   Pain Duration: intermittent  Pain Management/Control: tylenol if needed      Is pain affecting your ability to take care of yourself or move throughout your home? []? Yes   [x]? No    General: Fatigue     Eyes (Ophthalmic): No Change                Skin (Dermatological): 2 pea sized areas of moist desquamation left inframammory fold                ENT: No Problems, dry mouth from medications                Respiratory: No Problems                Cardiovascular: No Problems     Gastrointestinal: No Problems     Genito-Urinary: has blood in urine, will have US next week, seeing Dr Rinku Reeves                 Breast: Changes, see skin assessment                Musculoskeletal: Joint Pain and Back Pain, chronic     Neurological: Numbness and Tingling, feet                            Hematological and Lymphatic: No Problems                Endocrine: Diabetes Mellitus     Gyn History:   No problems    A 10-point review of systems has been conducted and pertinent positives have been   recorded. All other review of systems are negative. ECOG PERFORMANCE STATUS: 1    VITAL SIGNS:   Vitals:    03/15/21 1118   BP: 120/67   Pulse: 95   Resp: 16   Temp: 97 °F (36.1 °C)   SpO2: 96%   Weight: 220 lb (99.8 kg)       PHYSICAL EXAMINATION:  Constitutional: No acute distress.  awake, alert, cooperative    CHEST/BREASTS:  Breasts relatively symmetrical, skin L inframammary w 2 adjacent areas 2 mm punctate desquamation no infection, no nipple retraction or dimpling, no nipple discharge, no masses palpated, no axillary or supraclavicular adenopathy    EXTREMITIES: NO cyanosis clubbing or lymphedema      IMPRESSION/PLAN:    punctate moist desquamation under breast where skin sticks together. Mepilex return in 1 week for skin check, survivorhsip discussed. Written materials given    Thank you for allowing us to participate in the care of this patient.   Sincerely,    Electronically signed by Ayse Laboy MD on 3/15/21 at 12:16 PM EDT      cc:   MD Pau Livingston DO Leilani Natter, MD  [address] [place] [address]

## 2021-03-15 NOTE — PROGRESS NOTES
NURSING ASSESSMENT     Date: 3/15/2021        Patient Name: Petey Lezama     YOB: 1955      Age:  72 y.o. MRN: [id number]     Chaperone [] Yes   [x] No      Pain Score:   Pain Score (1-10): 7  Pain Location: under left breast   Pain Duration: intermittent  Pain Management/Control: tylenol if needed      Is pain affecting your ability to take care of yourself or move throughout your home? [] Yes   [x] No    General: Fatigue    Eyes (Ophthalmic): No Change     Skin (Dermatological): 2 pea sized areas of moist desquamation left inframammory fold     ENT: No Problems, dry mouth from medications     Respiratory: No Problems     Cardiovascular: No Problems    Gastrointestinal: No Problems    Genito-Urinary: has blood in urine, will have US next week, seeing Dr Derek Viera      Breast: Changes, see skin assessment     Musculoskeletal: Joint Pain and Back Pain, chronic    Neurological: Numbness and Tingling, feet      Hematological and Lymphatic: No Problems     Endocrine: Diabetes Mellitus    Gyn History:   No problems  A 10-point review of systems has been conducted and pertinent positives have been   recorded. All other review of systems are negative    Was the patient admitted during the course of treatment OR within 30 days of treatment?  no    Additional Comments: here for 1 week follow up

## 2021-03-24 ENCOUNTER — HOSPITAL ENCOUNTER (OUTPATIENT)
Dept: RADIATION ONCOLOGY | Age: 66
Discharge: HOME | End: 2021-03-24
Attending: RADIOLOGY
Payer: MEDICARE

## 2021-03-24 VITALS
HEART RATE: 99 BPM | TEMPERATURE: 97 F | DIASTOLIC BLOOD PRESSURE: 58 MMHG | OXYGEN SATURATION: 97 % | RESPIRATION RATE: 16 BRPM | SYSTOLIC BLOOD PRESSURE: 106 MMHG

## 2021-03-24 PROCEDURE — 99212 OFFICE O/P EST SF 10 MIN: CPT | Performed by: RADIOLOGY

## 2021-03-24 RX ORDER — CALCIUM CARBONATE 200(500)MG
1 TABLET,CHEWABLE ORAL DAILY
COMMUNITY
End: 2021-12-08

## 2021-03-24 NOTE — PROGRESS NOTES
NURSING ASSESSMENT     Date: 3/24/2021        Patient Name: Aurelio Flores     YOB: 1955      Age:  72 y.o. MRN: [id number]     Chaperone [] Yes   [x] No      Pain Score:   Pain Score (1-10):  None at this time  Pain Location: feet, knees and back   Pain Duration: intermittent  Pain Management/Control: nothing needed for pain      Is pain affecting your ability to take care of yourself or move throughout your home? [] Yes   [x] No    General: Fatigue    Eyes (Ophthalmic): No Problem     Skin (Dermatological): healing inframammory fold, slightly reddened but no open areas     ENT: No Problems     Respiratory: Cough and ALONZO, dry cough     Cardiovascular: Chest Pressure this am, lasted 10 mins      Device   [] Yes   [x] No   Copy of Card Obtained [] Yes   [x] No    Gastrointestinal: No Problems    Genito-Urinary: has hematuria, saw urologist today     Breast: Changes, see skin assessment above     Musculoskeletal: Joint Pain and Back Pain    Neurological: feet and toes numb      Hematological and Lymphatic: No Problems     Endocrine: Diabetes Mellitus blood sugar 197 this am    Gyn History:   No problems    A 10-point review of systems has been conducted and pertinent positives have been   recorded. All other review of systems are negative    Was the patient admitted during the course of treatment OR within 30 days of treatment?  no    Additional Comments: pt here for skin check

## 2021-05-21 PROCEDURE — C1819 TISSUE LOCALIZATION-EXCISION: HCPCS

## 2021-08-09 ENCOUNTER — HOSPITAL ENCOUNTER (OUTPATIENT)
Dept: WOMENS IMAGING | Age: 66
Discharge: HOME | End: 2021-08-11
Payer: MEDICARE

## 2021-08-09 DIAGNOSIS — N64.9 BREAST DISORDER: ICD-10-CM

## 2021-08-09 DIAGNOSIS — C50.412 CARCINOMA OF UPPER-OUTER QUADRANT OF LEFT BREAST IN FEMALE, ESTROGEN RECEPTOR POSITIVE (HCC): ICD-10-CM

## 2021-08-09 DIAGNOSIS — Z17.0 CARCINOMA OF UPPER-OUTER QUADRANT OF LEFT BREAST IN FEMALE, ESTROGEN RECEPTOR POSITIVE (HCC): ICD-10-CM

## 2021-08-09 PROCEDURE — G0279 TOMOSYNTHESIS, MAMMO: HCPCS

## 2021-08-30 ENCOUNTER — OFFICE VISIT (OUTPATIENT)
Dept: SURGERY | Age: 66
End: 2021-08-30
Payer: MEDICARE

## 2021-08-30 VITALS
RESPIRATION RATE: 16 BRPM | HEART RATE: 98 BPM | WEIGHT: 216 LBS | BODY MASS INDEX: 30.24 KG/M2 | HEIGHT: 71 IN | DIASTOLIC BLOOD PRESSURE: 76 MMHG | SYSTOLIC BLOOD PRESSURE: 126 MMHG

## 2021-08-30 DIAGNOSIS — Z17.0 CARCINOMA OF UPPER-OUTER QUADRANT OF LEFT BREAST IN FEMALE, ESTROGEN RECEPTOR POSITIVE (HCC): Primary | ICD-10-CM

## 2021-08-30 DIAGNOSIS — N64.9 BREAST DISORDER: ICD-10-CM

## 2021-08-30 DIAGNOSIS — C50.412 CARCINOMA OF UPPER-OUTER QUADRANT OF LEFT BREAST IN FEMALE, ESTROGEN RECEPTOR POSITIVE (HCC): Primary | ICD-10-CM

## 2021-08-30 DIAGNOSIS — Z12.31 ENCOUNTER FOR SCREENING MAMMOGRAM FOR BREAST CANCER: ICD-10-CM

## 2021-08-30 PROCEDURE — 1036F TOBACCO NON-USER: CPT | Performed by: SURGERY

## 2021-08-30 PROCEDURE — 3017F COLORECTAL CA SCREEN DOC REV: CPT | Performed by: SURGERY

## 2021-08-30 PROCEDURE — G9899 SCRN MAM PERF RSLTS DOC: HCPCS | Performed by: SURGERY

## 2021-08-30 PROCEDURE — 1090F PRES/ABSN URINE INCON ASSESS: CPT | Performed by: SURGERY

## 2021-08-30 PROCEDURE — 99214 OFFICE O/P EST MOD 30 MIN: CPT | Performed by: SURGERY

## 2021-08-30 PROCEDURE — G8427 DOCREV CUR MEDS BY ELIG CLIN: HCPCS | Performed by: SURGERY

## 2021-08-30 PROCEDURE — 1123F ACP DISCUSS/DSCN MKR DOCD: CPT | Performed by: SURGERY

## 2021-08-30 PROCEDURE — G8417 CALC BMI ABV UP PARAM F/U: HCPCS | Performed by: SURGERY

## 2021-08-30 PROCEDURE — G8400 PT W/DXA NO RESULTS DOC: HCPCS | Performed by: SURGERY

## 2021-08-30 PROCEDURE — 4040F PNEUMOC VAC/ADMIN/RCVD: CPT | Performed by: SURGERY

## 2021-08-30 RX ORDER — ANASTROZOLE 1 MG/1
1 TABLET ORAL DAILY
COMMUNITY
Start: 2021-05-26 | End: 2021-08-30

## 2021-08-30 RX ORDER — LETROZOLE 2.5 MG/1
2.5 TABLET, FILM COATED ORAL DAILY
COMMUNITY
End: 2021-12-08

## 2021-08-30 RX ORDER — DULOXETIN HYDROCHLORIDE 20 MG/1
1 CAPSULE, DELAYED RELEASE ORAL DAILY
COMMUNITY
Start: 2021-08-29

## 2021-08-30 ASSESSMENT — ENCOUNTER SYMPTOMS
SHORTNESS OF BREATH: 1
VOMITING: 0
ABDOMINAL PAIN: 0
CHEST TIGHTNESS: 0
COLOR CHANGE: 0
SORE THROAT: 0
NAUSEA: 0
COUGH: 0

## 2021-08-30 NOTE — PROGRESS NOTES
Apply 1 Dose topically daily      diclofenac sodium (VOLTAREN) 1 % GEL Apply 2 g topically 4 times daily      augmented betamethasone dipropionate (DIPROLENE-AF) 0.05 % cream Apply 1 Dose topically 2 times daily      insulin lispro (HUMALOG) 100 UNIT/ML injection vial Inject into the skin 3 times daily (before meals) Indications: sliding scale 15 units TID      aspirin 81 MG tablet Take 81 mg by mouth daily 3/24/21 On hold for now      ALPRAZolam (XANAX) 0.5 MG tablet Take 0.5 mg by mouth every 6 hours as needed for Sleep.  mycophenolate (CELLCEPT) 500 MG tablet Take 1,000 mg by mouth 2 times daily      tacrolimus (PROGRAF) 0.5 MG capsule Take 0.5 mg by mouth 2 times daily      insulin glargine (LANTUS SOLOSTAR) 100 UNIT/ML injection pen Inject into the skin 2 times daily 45 units in the AM  55 at bedtime      omeprazole (PRILOSEC) 20 MG delayed release capsule Take 20 mg by mouth daily      doxycycline hyclate (VIBRA-TABS) 100 MG tablet Take 100 mg by mouth daily as needed       glimepiride (AMARYL) 4 MG tablet Take 4 mg by mouth every morning (before breakfast)      clindamycin-benzoyl peroxide (BENZACLIN) 1-5 % gel Apply topically nightly Apply topically 2 times daily.  pravastatin (PRAVACHOL) 10 MG tablet Take 10 mg by mouth nightly      lisinopril (PRINIVIL;ZESTRIL) 5 MG tablet Take 5 mg by mouth daily      SITagliptin (JANUVIA) 100 MG tablet Take 100 mg by mouth daily      escitalopram (LEXAPRO) 20 MG tablet Take 20 mg by mouth daily      diphenhydrAMINE (BENADRYL) 25 MG capsule Take 25 mg by mouth nightly as needed for Itching (2-4 tabs at bedtime prn)      nystatin (MYCOSTATIN) 930792 UNIT/GM ointment Apply topically 2 times daily Apply topically 2 times daily.  vitamin D (CHOLECALCIFEROL) 1000 UNIT TABS tablet Take 1,000 Units by mouth 2 times daily      tretinoin microspheres (RETIN-A MICRO) 0.1 % gel Apply topically nightly Apply topically nightly.       Simethicone (GAS-X distress. Chest:      Breasts: Breasts are symmetrical.         Right: No inverted nipple, mass, nipple discharge, skin change or tenderness. Left: Skin change (due to radiation) and tenderness (mild due to radiation) present. No inverted nipple, mass or nipple discharge. Musculoskeletal:         General: Normal range of motion. Cervical back: Normal range of motion and neck supple. Comments: Normal Range of motion in upper and lower extremities. Lymphadenopathy:      Cervical: No cervical adenopathy. Right cervical: No superficial, deep or posterior cervical adenopathy. Left cervical: No superficial, deep or posterior cervical adenopathy. Upper Body:      Right upper body: No supraclavicular, axillary or pectoral adenopathy. Left upper body: No supraclavicular, axillary or pectoral adenopathy. Skin:     General: Skin is warm and dry. Findings: No abrasion, bruising, erythema or lesion. Neurological:      Mental Status: She is alert and oriented to person, place, and time. She is not disoriented. Psychiatric:         Speech: Speech normal.         Behavior: Behavior normal. Behavior is cooperative. Thought Content: Thought content normal.         Judgment: Judgment normal.             IMAGING:     [facility] PO DIGITAL DIAGNOSTIC UNILATERAL LEFT    Result Date: 8/9/2021  EXAMINATION: [facility] PO DIGITAL DIAGNOSTIC UNILATERAL LEFT CLINICAL HISTORY:N64.9 Breast disorder ICD10 COMPARISON: Priors dating back to 2017 RESULT: 3-D tomosynthesis imaging of the left breast was performed. There are scattered fibroglandular densities. Interval postsurgical changes involving the left breast with multiple surgical clips and coarse calcifications, and probable postsurgical scarring. Diffuse skin thickening. There are no suspicious masses or asymmetries, areas of architectural distortion or suspicious areas of microcalcifications.   CAD analysis was performed and used in the interpretation. BI-RADS 2: BENIGN FINDINGS. ROUTINE FOLLOW-UP MAMMOGRAPHY IS SUGGESTED WHEN DUE FOR ANNUAL BILATERAL MAMMOGRAM. DENSITY: Scattered Board Certified Radiologists. Accredited by the ACR and FDA. MAMMOGRAPHY IS VERY IMPORTANT TO YOUR HEALTH. THE AMERICAN CANCER SOCIETY GUIDELINES RECOMMEND THAT WOMEN 36YEARS OF AGE AND OLDER SHOULD HAVE A MAMMOGRAM EVERY YEAR. A REMINDER LETTER WILL BE SENT AT THE APPROPRIATE TIME.  THIS FACILITY UTILIZES A REMINDER SYSTEM TO ENSURE ALL PATIENTS RECEIVE REMINDER NOTIFICATIONS AT THE APPROPRIATE TIME BASED ON THE RECOMMENDATIONS OF THIS EXAM. THIS INCLUDES REMINDERS FOR ROUTINE  SCREENING MAMMOGRAMS, DIAGNOSTIC MAMMOGRAMS IN WHICH THE PATIENT IS ASKED TO RETURN FOR ADDITIONAL VIEWS, OR OTHER BREAST IMAGING INTERVENTIONS WHEN APPROPRIATE. THE PATIENT WILL BE PLACED IN THE APPROPRIATE REMINDER SYSTEM INCLUDING A REMINDER AT THE APPROPRIATE TIME FOR ANY PENDING ADDITIONAL VIEWS. Assessment:       ICD-10-CM    1. Carcinoma of upper-outer quadrant of left breast in female, estrogen receptor positive [facility]  C50.412     Z17.0    2. Breast disorder  N64.9    3. Encounter for screening mammogram for breast cancer  Z12.31 BEE DIGITAL SCREEN W OR WO CAD BILATERAL         Plan:     Patient is doing well. Recommend clinical breast exams in the breast surgical suite in 6 month(s)  Mammography in 6 month(s)  Those on Aromatase Inhibitors should follow up with medical oncologist / PCP for monitoring bone health   Recommend an active lifestyle, healthy diet, limited alcohol intake, achieve and maintain a healthy BMI to optimize breast cancer outcomes / decrease risk of breast cancer.   Referral to or Follow up with Medical Oncology  Referral to or Follow up with Radiation Oncology  Follow up with PCP   Follow up with cardiology  Gave survivorship info / reviewed booklet       Total face to face time was 40 minutes with greater than 50% spent on counseling the patient or coordinating her care. Dictated by Matthias Benites MD 8/30/2021 10:49 AM       This note was partially generated using Dragon voice recognition system, and there may be some incorrect words, spellings, punctuation that were not noticed in checking the note before saving.

## 2021-08-30 NOTE — PROGRESS NOTES
General Information   Patient Name: Demond Viramontes  Patient : 1955    Patient QXWWY:[phone]  Email: [email or web address]    Health Care Providers (Including Names, Institution)   Primary Care Provider: Iliana Avalos DO    Surgeon: Luzma Mansfield MD [facility]   Radiation Oncologist: Dr. Xenia Daugherty Oncologist: [facility]       Treatment Summary   Diagnosis   Cancer Staging  Breast neoplasm, Tis (DCIS), left  Staging form: Breast, AJCC 8th Edition  - Clinical stage from 3/5/2018: Stage 0 (cTis (DCIS), cN0, cM0, ER+) - Signed by Luzma Mansfield MD on 2020    Carcinoma of upper-outer quadrant of left breast in female, estrogen receptor positive ([facility]  Staging form: Breast, AJCC 8th Edition  - Clinical stage from 2020: Stage IA (rcT1, cN0, cM0, G2, ER+, VA+, HER2-) - Signed by Luzma Mansfield MD on 2021       Diagnosis Year:    Treatment Completed   Surgery: [x] Yes   []No Surgery Date(s) (year): 2021   Surgical procedure/findings: Left Breast Needle LOC Lumpectomy and SLNB    Lymph node removal: []Axillary Dissection [x] [place] Biopsy   Radiation: Yes Body area treated: Breast End Date (year):3/8/2021   Systemic Therapy (chemotherapy, hormonal therapy, other): Yes  [] Before surgery [x] After surgery   Treatment Ongoing   Additional treatment name Planned duration Possible Side effects   [] Tamoxifen 5-10 Years Hot flashes and vaginal discharge (common); endometrial cancer, serious blood clots and eye problems (all very rare). Other rare side effects may occur. [x] Aromatase Inhibitors (anastrozole, exemestane and letrozole) 5 Years Hot flashes, joint/muscle aches, vaginal dryness and bone loss (common); hair thinning (rare) Other rare side effects may occur.   [] GnRH agonist (Zoladex, Lupron) for ovarian suppression  Hot flashes and vaginal dryness (common); other rare side effects may occur.    Other:     Persistent symptoms or side effects at completion of treatment:  Fatigue: Yes                                                                Menopausal symptoms: Yes    Hot flashes, night sweats  Numbness:  No                                                          Pain: Yes    Left lateral breast when touched  Psychosocial/Depression: No                                    Other (enter type(s)): Familial Cancer Risk Assessment   Breast and or ovarian cancer in 1st or 2nd degree relatives:  Yes, Paternal Grandmother, maternal Julianna Renato Aunt     Received Genetic counseling:      No Genetic testing:  no Genetic testing results:not completed       Follow-up Care Plan   Your follow-up care plan is design to inform you and primary care providers regarding the recommended and required follow-up, cancer screening and routine health maintenance that is needed to maintain optimal health. Possible late- and long-term effects that someone with this type of cancer and treatment may experience:  Weakening of the heart presenting as shortness of breath and swelling of legs (rare < 5%); and bones become weak and at risk for fracture (osteoporosis). It is important to remember that these symptoms can be due to other causes like diabetes or with normal aging. If these or any other new symptoms occur bring these to attention of your health care provider. These symptoms should be brought to the attention of your provider:   1. Anything that represents a brand new symptom;  2. Anything that represents a persistent symptom;  3. Anything you are worried about that might be related to the cancer coming back. Please continue to see your primary care provider for all general health care recommended for a woman your age such as routine immunizations, and routine non-breast cancer screening like colonoscopy or bone density exams. Consult with your health care provider about prevention and screening for bone loss using bone density tests.    Schedule for Clinical Visits Coordinating Provider When/How often   Chetan Veloz MD FACS Every 6 months for two years / Yearly afterwards   Medical Oncology Every 3-6 months for 5 years, yearly after that   Radiation Oncology 1 week, weekly depending on skin reaction. 4-6 weeks after treatment and then Per radiation oncology note if patient is considered high risk. Cancer Surveillance Or Other Recommended Tests   Coordinating Provider TEST How often   Chetan Veloz MD  Mammogram Annually   Chetan Veloz MD   MRI breast As indicated by provider   GYN Pap/pelvic exam As indicated by provider   Chelsea Diamond, DO  Colonoscopy As indicated by provider   Medical Oncology Bone Density Every 2 years if on an aromatase inhibitor or as indicated by your provider   Breast cancer survivors may experience issues with the areas listed below. If you have any concerns in these or other areas, please speak with your doctors or nurses to find out how you can get help with them. []Anxiety or depression   []Insurance     []Sexual Functioning  [x]Emotional and mental health []Memory or concentration loss         []Stopping Smoking   [x]Fatigue    []Parenting     [x]Weight changes   []Fertility    []Physical functioning   []Other   []Financial advice or assistance  []School/work       A number of lifestyle/behaviors can affect your ongoing health, including the risk for the cancer coming back or developing another cancer. Discuss these recommendations with your doctor or nurse:  []Alcohol use                [x] Physical activity                    [] Other  [x]Diet                 [x] Sun screen use      []Management of my medications              [] Tobacco use/cessation       []Management of my other illnesses  [] Weight management (loss/gain)                              Resources you may be interested in:      www.cancer. net   Other: [employer] Survivorship book given and reviewed extensively  [employer] for [employer];  [employer] Program  [facility] / [facility] Cancer Survivorship Programs - Flyer given     Other comments: none   Prepared by:   Solo Goldsmith MD FACS                                                           Delivered on: 8/30/2021

## 2021-12-08 ENCOUNTER — HOSPITAL ENCOUNTER (OUTPATIENT)
Age: 66
Setting detail: SPECIMEN
Discharge: HOME | End: 2021-12-08
Payer: MEDICARE

## 2021-12-08 ENCOUNTER — OFFICE VISIT (OUTPATIENT)
Dept: SURGERY | Age: 66
End: 2021-12-08
Payer: MEDICARE

## 2021-12-08 VITALS
SYSTOLIC BLOOD PRESSURE: 128 MMHG | HEIGHT: 71 IN | RESPIRATION RATE: 16 BRPM | HEART RATE: 104 BPM | WEIGHT: 218 LBS | BODY MASS INDEX: 30.52 KG/M2 | DIASTOLIC BLOOD PRESSURE: 84 MMHG

## 2021-12-08 DIAGNOSIS — N64.9 BREAST DISORDER: ICD-10-CM

## 2021-12-08 DIAGNOSIS — C50.412 CARCINOMA OF UPPER-OUTER QUADRANT OF LEFT BREAST IN FEMALE, ESTROGEN RECEPTOR POSITIVE (HCC): Primary | ICD-10-CM

## 2021-12-08 DIAGNOSIS — N63.20 LEFT BREAST MASS: ICD-10-CM

## 2021-12-08 DIAGNOSIS — E66.9 OBESITY, CLASS I, BMI 30-34.9: ICD-10-CM

## 2021-12-08 DIAGNOSIS — Z17.0 CARCINOMA OF UPPER-OUTER QUADRANT OF LEFT BREAST IN FEMALE, ESTROGEN RECEPTOR POSITIVE (HCC): Primary | ICD-10-CM

## 2021-12-08 PROCEDURE — 99024 POSTOP FOLLOW-UP VISIT: CPT | Performed by: SURGERY

## 2021-12-08 PROCEDURE — 88305 TISSUE EXAM BY PATHOLOGIST: CPT

## 2021-12-08 PROCEDURE — 76642 ULTRASOUND BREAST LIMITED: CPT | Performed by: SURGERY

## 2021-12-08 PROCEDURE — 19083 BX BREAST 1ST LESION US IMAG: CPT | Performed by: SURGERY

## 2021-12-08 RX ORDER — LIDOCAINE HYDROCHLORIDE 10 MG/ML
10 INJECTION, SOLUTION INFILTRATION; PERINEURAL ONCE
Status: DISCONTINUED | OUTPATIENT
Start: 2021-12-08 | End: 2022-03-07

## 2021-12-08 RX ORDER — MYCOPHENOLATE MOFETIL 250 MG/1
1 CAPSULE ORAL 3 TIMES DAILY
COMMUNITY
Start: 2021-11-30

## 2021-12-08 ASSESSMENT — ENCOUNTER SYMPTOMS
SHORTNESS OF BREATH: 1
SORE THROAT: 0
COLOR CHANGE: 0
CHEST TIGHTNESS: 0
COUGH: 0
VOMITING: 0
NAUSEA: 0
ABDOMINAL PAIN: 0

## 2021-12-08 NOTE — PROGRESS NOTES
daily (before meals) Indications: sliding scale 15 units TID      ALPRAZolam (XANAX) 0.5 MG tablet Take 0.5 mg by mouth every 6 hours as needed for Sleep.  tacrolimus (PROGRAF) 0.5 MG capsule Take 0.5 mg by mouth 2 times daily      insulin glargine (LANTUS SOLOSTAR) 100 UNIT/ML injection pen Inject into the skin 2 times daily 45 units in the AM  55 at bedtime      omeprazole (PRILOSEC) 20 MG delayed release capsule Take 20 mg by mouth daily      doxycycline hyclate (VIBRA-TABS) 100 MG tablet Take 100 mg by mouth daily as needed       glimepiride (AMARYL) 4 MG tablet Take 4 mg by mouth every morning (before breakfast)      clindamycin-benzoyl peroxide (BENZACLIN) 1-5 % gel Apply topically nightly Apply topically 2 times daily.  pravastatin (PRAVACHOL) 10 MG tablet Take 10 mg by mouth nightly      lisinopril (PRINIVIL;ZESTRIL) 5 MG tablet Take 5 mg by mouth daily      SITagliptin (JANUVIA) 100 MG tablet Take 100 mg by mouth daily      escitalopram (LEXAPRO) 20 MG tablet Take 20 mg by mouth daily      diphenhydrAMINE (BENADRYL) 25 MG capsule Take 25 mg by mouth nightly as needed for Itching (2-4 tabs at bedtime prn)      nystatin (MYCOSTATIN) 375868 UNIT/GM ointment Apply topically 2 times daily Apply topically 2 times daily.  vitamin D (CHOLECALCIFEROL) 1000 UNIT TABS tablet Take 1,000 Units by mouth 2 times daily      tretinoin microspheres (RETIN-A MICRO) 0.1 % gel Apply topically nightly Apply topically nightly.  Simethicone (GAS-X EXTRA STRENGTH) 125 MG CAPS Take 1 capsule by mouth 3 times daily       No current facility-administered medications on file prior to visit. Any over the counter meds / herbal supplements / vitamins: yes    Review of Systems  Review of Systems   Constitutional: Positive for fatigue (for years, pcp aware). Negative for activity change, appetite change, chills, diaphoresis, fever and unexpected weight change.    HENT: Negative for congestion, ear pain, axillary adenopathy or supraclavicular adenopathy. Musculoskeletal:         General: Normal range of motion. Cervical back: Normal range of motion and neck supple. Comments: Normal Range of motion in upper and lower extremities. Lymphadenopathy:      Cervical: No cervical adenopathy. Right cervical: No superficial, deep or posterior cervical adenopathy. Left cervical: No superficial, deep or posterior cervical adenopathy. Upper Body:      Right upper body: No supraclavicular, axillary or pectoral adenopathy. Left upper body: No supraclavicular, axillary or pectoral adenopathy. Skin:     General: Skin is warm and dry. Findings: No abrasion, bruising, erythema or lesion. Neurological:      Mental Status: She is alert and oriented to person, place, and time. She is not disoriented. Psychiatric:         Speech: Speech normal.         Behavior: Behavior normal. Behavior is cooperative. Thought Content: Thought content normal.         Judgment: Judgment normal.          IMAGING:     Ultrasound of the Left Breast, Limited    PATIENT:  KAE LAWS     DATE: 2021    :      1955     INDICATION:  Left Breast Mass and Palpable area of Concern    LOCATION:   2 o'clock 5 cm from nipple    DESCRIPTION:    The ultrasound probe was placed over the upper, outer in the transverse and sagittal views. 0.5x0. 3x0.6 cm hypoechoic nodule, with irregular borders, without posterior shadowing and wider that tall; appears to have an anechoic component. IMPRESSION: Left breast ultrasound demonstrates a suspicious mass     Category 4    Dictated by:  Hilario Brody MD, FACS 2021      Assessment:       ICD-10-CM    1. Carcinoma of upper-outer quadrant of left breast in female, estrogen receptor positive ([facility] [address]  C50.412     Z17.0    2. Left breast mass  N63.20    3. Breast disorder  N64.9    4.  Obesity, Class I, BMI 30-34.9  E66.9          Plan:     We discussed the clinical exam and the imaging findings. I recommended an ultrasound guided fine needle aspiration and possible core needle biopsy and possible clip placement. If the lesion is found to be solid or more tissue is needed for diagnosis an ultrasound guided core biopsy will be performed with the need for follow-up versus full removal of the lesion in the Operating Room. She was told that 95% of the time we can obtain a diagnosis. If the results are non-diagnostic she may require a more extensive biopsy. The patient agreed to undergo the ultrasound guided fine needle aspiration and biopsy in the office setting understanding the risks of bleeding and infection. See ultrasound report. Ultrasound Guided Left Breast Core Needle Biopsy and Clip Placement    PATIENT:  Andria LAWS     DATE: 2021    :      1955     INDICATION:  Left Ultrasound Abnormality, Breast Mass and Palpable area of Concern    LOCATION:   2 o'clock 5 cm from nipple    DESCRIPTION:    A timeout was performed immediately prior to the start of the Ultrasound Guided Left Breast Core Needle Biopsy and Clip Placement procedure and included the correct patient (two identifiers), correct procedure and correct site(s). Procedure consent and allergies were also verified. The patient understood the risks and benefits of the procedure and consented on the day of her visit. The ultrasound probe was placed over the suspicious lesion. The skin was prepped with chlorhexidine. 13 cc of lidocaine/bicarb mixture was used to anesthetize the skin and breast.  An 11 scalpel was used to make a small quarter inch incision. 3 14-gauge Achieve core needle biopsies were performed, laterally to medially. The samples were placed in formalin. A clip was placed under ultrasound guidance. Hemostasis was obtained. The wound was cleaned. Steri-strips applied. Five minutes of pressure was held. The patient tolerated the procedure well. IMPRESSION:   Successful Ultrasound Guided Left Breast Core Needle Biopsy and Clip Placement    Category 4    Dictated by:  Aurora Garcia MD, FACS 12/8/2021         Total face to face time was 35 minutes with greater than 50% spent on counseling the patient or coordinating her care. Dictated by Marybeth Ramirez MD 12/8/2021 8:10 AM       This note was partially generated using Dragon voice recognition system, and there may be some incorrect words, spellings, punctuation that were not noticed in checking the note before saving.

## 2021-12-09 ENCOUNTER — TELEPHONE (OUTPATIENT)
Dept: SURGERY | Age: 66
End: 2021-12-09

## 2021-12-09 NOTE — TELEPHONE ENCOUNTER
I called the patient post Left Breast Biopsy. The patient verbalized the dressing remained dry and intact. The patient denies pain. The patient has no questions/concerns at this time.

## 2021-12-13 ENCOUNTER — TELEPHONE (OUTPATIENT)
Dept: SURGERY | Age: 66
End: 2021-12-13

## 2021-12-13 NOTE — TELEPHONE ENCOUNTER
I called the patient and notified her that her Left Breast Core Biopsy pathology results are benign and are concordant with Dr. Amber Kan findings. The patient verbalized understanding of her test results. The patient educated on the importance of:   1. Bilateral Breast Screening mammogram 2/21, and follow up in the office 3/2021 as scheduled. 2. Breast self-awareness  3. An active lifestyle, healthy diet, limited alcohol intake, achieve and maintain a healthy BMI to optimize breast cancer outcomes. 5.Return to Dr. Gina Zimmerman if you have any new breast shape, nipple discharge, or breast lump. The patient verbalized understanding and has no questions or concerns at this time.

## 2021-12-13 NOTE — TELEPHONE ENCOUNTER
----- Message from Angélica Mckeon MD sent at 12/13/2021 10:07 AM EST -----  Regarding: call with neg path    ----- Message -----  From: Catherine Ching Incoming Lab Results From Soft  Sent: 12/10/2021   3:20 PM EST  To: Angélica Mckeon MD

## 2022-02-28 ENCOUNTER — HOSPITAL ENCOUNTER (OUTPATIENT)
Dept: WOMENS IMAGING | Age: 67
Discharge: HOME | End: 2022-03-02
Payer: MEDICARE

## 2022-02-28 VITALS — BODY MASS INDEX: 30.78 KG/M2 | HEIGHT: 70 IN | WEIGHT: 215 LBS

## 2022-02-28 DIAGNOSIS — Z12.31 ENCOUNTER FOR SCREENING MAMMOGRAM FOR BREAST CANCER: ICD-10-CM

## 2022-02-28 PROCEDURE — 77063 BREAST TOMOSYNTHESIS BI: CPT

## 2022-03-07 ENCOUNTER — OFFICE VISIT (OUTPATIENT)
Dept: SURGERY | Age: 67
End: 2022-03-07
Payer: MEDICARE

## 2022-03-07 VITALS — WEIGHT: 214.6 LBS | BODY MASS INDEX: 30.72 KG/M2 | HEIGHT: 70 IN

## 2022-03-07 DIAGNOSIS — F32.A DEPRESSION, UNSPECIFIED DEPRESSION TYPE: ICD-10-CM

## 2022-03-07 DIAGNOSIS — Z17.0 CARCINOMA OF UPPER-OUTER QUADRANT OF LEFT BREAST IN FEMALE, ESTROGEN RECEPTOR POSITIVE (HCC): Primary | ICD-10-CM

## 2022-03-07 DIAGNOSIS — C50.412 CARCINOMA OF UPPER-OUTER QUADRANT OF LEFT BREAST IN FEMALE, ESTROGEN RECEPTOR POSITIVE (HCC): Primary | ICD-10-CM

## 2022-03-07 DIAGNOSIS — E66.9 OBESITY, CLASS I, BMI 30-34.9: ICD-10-CM

## 2022-03-07 PROBLEM — R79.89 D-DIMER, ELEVATED: Status: ACTIVE | Noted: 2021-05-03

## 2022-03-07 PROBLEM — E83.42 HYPOMAGNESEMIA: Status: ACTIVE | Noted: 2022-03-07

## 2022-03-07 PROBLEM — R74.8 ELEVATED LIVER ENZYMES: Status: ACTIVE | Noted: 2022-03-07

## 2022-03-07 PROBLEM — R31.0 GROSS HEMATURIA: Status: ACTIVE | Noted: 2021-04-20

## 2022-03-07 PROCEDURE — 1123F ACP DISCUSS/DSCN MKR DOCD: CPT | Performed by: SURGERY

## 2022-03-07 PROCEDURE — G8417 CALC BMI ABV UP PARAM F/U: HCPCS | Performed by: SURGERY

## 2022-03-07 PROCEDURE — G8484 FLU IMMUNIZE NO ADMIN: HCPCS | Performed by: SURGERY

## 2022-03-07 PROCEDURE — 3017F COLORECTAL CA SCREEN DOC REV: CPT | Performed by: SURGERY

## 2022-03-07 PROCEDURE — 1036F TOBACCO NON-USER: CPT | Performed by: SURGERY

## 2022-03-07 PROCEDURE — G9899 SCRN MAM PERF RSLTS DOC: HCPCS | Performed by: SURGERY

## 2022-03-07 PROCEDURE — 99215 OFFICE O/P EST HI 40 MIN: CPT | Performed by: SURGERY

## 2022-03-07 PROCEDURE — 1090F PRES/ABSN URINE INCON ASSESS: CPT | Performed by: SURGERY

## 2022-03-07 PROCEDURE — 4040F PNEUMOC VAC/ADMIN/RCVD: CPT | Performed by: SURGERY

## 2022-03-07 PROCEDURE — G8400 PT W/DXA NO RESULTS DOC: HCPCS | Performed by: SURGERY

## 2022-03-07 PROCEDURE — G8427 DOCREV CUR MEDS BY ELIG CLIN: HCPCS | Performed by: SURGERY

## 2022-03-07 RX ORDER — SEMAGLUTIDE 1.34 MG/ML
1 INJECTION, SOLUTION SUBCUTANEOUS WEEKLY
COMMUNITY
Start: 2022-02-21 | End: 2022-09-12

## 2022-03-07 RX ORDER — AMOXICILLIN 500 MG/1
4 CAPSULE ORAL DAILY PRN
COMMUNITY
Start: 2022-01-19

## 2022-03-07 RX ORDER — DOXYCYCLINE 100 MG/1
1 TABLET ORAL DAILY PRN
COMMUNITY
Start: 2022-01-17 | End: 2022-09-12

## 2022-03-07 RX ORDER — CLOTRIMAZOLE AND BETAMETHASONE DIPROPIONATE 10; .64 MG/G; MG/G
CREAM TOPICAL
Qty: 1 EACH | Refills: 2 | Status: SHIPPED | OUTPATIENT
Start: 2022-03-07

## 2022-03-07 ASSESSMENT — ENCOUNTER SYMPTOMS
COUGH: 0
SHORTNESS OF BREATH: 1
NAUSEA: 0
VOMITING: 0
CHEST TIGHTNESS: 0
SORE THROAT: 0
ABDOMINAL PAIN: 0
COLOR CHANGE: 0

## 2022-03-07 NOTE — PROGRESS NOTES
PATIENT:    Elizabet Lew     DATE:      3/7/2022     TIME: 10:41 AM     Subjective:     Elizabet Lew presents for follow-up of breast cancer. jshe complains of sweating a lot under her breast and groin creases. The breast cancer is Cancer Staging  Breast neoplasm, Tis (DCIS), left  Staging form: Breast, AJCC 8th Edition  - Clinical stage from 3/5/2018: Stage 0 (cTis (DCIS), cN0, cM0, ER+) - Signed by Jamarcus Fishman MD on 12/2/2020    Carcinoma of upper-outer quadrant of left breast in female, estrogen receptor positive ([facility] [address]  Staging form: Breast, AJCC 8th Edition  - Clinical stage from 12/14/2020: Stage IA (rcT1, cN0, cM0, G2, ER+, TN+, HER2-) - Signed by Jamarcus Fishman MD on 1/4/2021     She does perform self breast exams. She denies breast pain, masses, skin changes, nipple discharge, nipple retraction, or recent breast trauma bilaterally. Patient's medications, allergies, past medical, surgical, social and family histories were reviewed and updated as appropriate. Current Outpatient Medications on File Prior to Visit   Medication Sig Dispense Refill    OZEMPIC, 0.25 OR 0.5 MG/DOSE, 2 MG/1.5ML SOPN Inject 1 Dose into the skin once a week      doxycycline monohydrate (ADOXA) 100 MG tablet Take 1 tablet by mouth daily as needed      amoxicillin (AMOXIL) 500 MG capsule Take 4 capsules by mouth daily as needed Prior to dental work      mycophenolate (CELLCEPT) 250 MG capsule Take 1 capsule by mouth 3 times daily      DULoxetine (CYMBALTA) 20 MG extended release capsule Take 1 capsule by mouth daily      Blood Glucose Monitoring Suppl (TRUE METRIX AIR GLUCOSE METER) w/Device KIT USE AS DIRECTED FOR CHECKING BLOOD SUGAR 4 TIMES DAILY.  TRUE METRIX BLOOD GLUCOSE TEST strip USE AS DIRECTED FOR CHECKING BLOOD SUGAR 4 TIMES DAILY.  TRUEplus Lancets 28G MISC USE AS DIRECTED FOR CHECKING BLOOD SUGAR 4 TIMES DAILY.       Multiple Vitamins-Minerals (OCUVITE ADULT FORMULA PO) Take by mouth      diclofenac sodium (VOLTAREN) 1 % GEL Apply 2 g topically 4 times daily      augmented betamethasone dipropionate (DIPROLENE-AF) 0.05 % cream Apply 1 Dose topically 2 times daily      insulin lispro (HUMALOG) 100 UNIT/ML injection vial Inject into the skin 3 times daily (before meals) Indications: sliding scale 15 units TID      ALPRAZolam (XANAX) 0.5 MG tablet Take 0.5 mg by mouth every 6 hours as needed for Sleep.  tacrolimus (PROGRAF) 0.5 MG capsule Take 0.5 mg by mouth 2 times daily      insulin glargine (LANTUS SOLOSTAR) 100 UNIT/ML injection pen Inject into the skin 2 times daily 45 units in the AM  55 at bedtime      omeprazole (PRILOSEC) 20 MG delayed release capsule Take 20 mg by mouth daily      doxycycline hyclate (VIBRA-TABS) 100 MG tablet Take 100 mg by mouth daily as needed       clindamycin-benzoyl peroxide (BENZACLIN) 1-5 % gel Apply topically nightly Apply topically 2 times daily.  pravastatin (PRAVACHOL) 10 MG tablet Take 10 mg by mouth nightly      lisinopril (PRINIVIL;ZESTRIL) 5 MG tablet Take 5 mg by mouth daily      escitalopram (LEXAPRO) 20 MG tablet Take 20 mg by mouth daily      diphenhydrAMINE (BENADRYL) 25 MG capsule Take 25 mg by mouth nightly as needed for Itching (2-4 tabs at bedtime prn)      nystatin (MYCOSTATIN) 528339 UNIT/GM ointment Apply topically 2 times daily Apply topically 2 times daily.  vitamin D (CHOLECALCIFEROL) 1000 UNIT TABS tablet Take 1,000 Units by mouth 2 times daily      tretinoin microspheres (RETIN-A MICRO) 0.1 % gel Apply topically nightly Apply topically nightly.  Simethicone (GAS-X EXTRA STRENGTH) 125 MG CAPS Take 1 capsule by mouth 3 times daily       No current facility-administered medications on file prior to visit.         Any over the counter meds / herbal supplements / vitamins: yes    Review of Systems (will see pcp soon for symptoms)  Review of Systems   Constitutional: Positive for fatigue (she thinks it has to do with sleep). Negative for activity change, appetite change, chills, diaphoresis, fever and unexpected weight change. HENT: Negative for congestion, ear pain, hearing loss, mouth sores, nosebleeds and sore throat. Respiratory: Positive for shortness of breath (she is not sure why; told her cardiologist and cardiac workup is negative). Negative for cough and chest tightness. Cardiovascular: Negative for chest pain, palpitations and leg swelling. Gastrointestinal: Negative for abdominal pain, nausea and vomiting. Endocrine: Negative for cold intolerance, heat intolerance, polydipsia, polyphagia and polyuria. Genitourinary: Negative for difficulty urinating, menstrual problem and vaginal bleeding. Musculoskeletal: Negative for neck pain and neck stiffness. Skin: Negative for color change, pallor, rash and wound. Allergic/Immunologic: Negative for environmental allergies and immunocompromised state. Neurological: Negative for weakness. Hematological: Does not bruise/bleed easily. Psychiatric/Behavioral: Positive for dysphoric mood (she talked to a therapist in the past; she works on art projects). Negative for agitation, confusion, sleep disturbance and suicidal ideas. The patient is nervous/anxious (she is doing nothing about it). Objective:     Vitals:    03/07/22 1001   Weight: 214 lb 9.6 oz (97.3 kg)   Height: 5' 10\" (1.778 m)        Lymphedema Screening 3/7/2022 8/30/2021   RIGHT 10 cm (Hand) - 19.2   RIGHT 20 cm (Wrist) - 19   RIGHT 30 cm (Forearm) - 24.5   RIGHT 60 cm (Upper Arm) - 34   LEFT 10 cm (Hand) 19.5 -   LEFT 20 cm (Wrist) 18.5 -   LEFT 30 cm (Forearm) 21.5 -   LEFT 60 cm (Upper Arm) 33.5 -       Physical Exam  Vitals reviewed. Constitutional:       Appearance: Normal appearance. She is well-developed. HENT:      Head: Normocephalic and atraumatic. Nose: Nose normal.   Eyes:      Conjunctiva/sclera: Conjunctivae normal.      Right eye: No hemorrhage.      Left eye: No hemorrhage. Cardiovascular:      Rate and Rhythm: Normal rate. Pulmonary:      Effort: Pulmonary effort is normal. No respiratory distress. Chest:   Breasts: Breasts are symmetrical.      Right: No inverted nipple, mass, nipple discharge, skin change, tenderness, axillary adenopathy or supraclavicular adenopathy. Left: Skin change (early fungal infection from moisture) present. No inverted nipple, mass, nipple discharge, tenderness, axillary adenopathy or supraclavicular adenopathy. Abdominal:      Tenderness: There is no abdominal tenderness. Musculoskeletal:         General: Normal range of motion. Cervical back: Normal range of motion and neck supple. Comments: Normal Range of motion in upper and lower extremities. Lymphadenopathy:      Cervical: No cervical adenopathy. Right cervical: No superficial, deep or posterior cervical adenopathy. Left cervical: No superficial, deep or posterior cervical adenopathy. Upper Body:      Right upper body: No supraclavicular, axillary or pectoral adenopathy. Left upper body: No supraclavicular, axillary or pectoral adenopathy. Skin:     General: Skin is warm and dry. Findings: No abrasion, bruising, erythema or lesion. Neurological:      Mental Status: She is alert and oriented to person, place, and time. She is not disoriented. Psychiatric:         Speech: Speech normal.         Behavior: Behavior normal. Behavior is cooperative. Thought Content: Thought content normal.         Judgment: Judgment normal.          IMAGING:     [facility] PO DIGITAL SCREEN BILATERAL    Result Date: 2/28/2022  EXAMINATION: [facility] PO DIGITAL SCREEN BILATERAL CLINICAL HISTORY:Z12.31 Encounter for screening mammogram for breast cancer ICD10 COMPARISON: January 21, 2021 and November 23, 2020 FINDINGS:3-D tomosynthesis imaging of the bilateral breasts was performed.  There are scattered fibroglandular densities within each breast. Postbiopsy changes in the left breast. There are no suspicious masses, areas of architectural distortion or suspicious areas of microcalcifications. CAD analysis was performed and used in the interpretation. BI-RADS 2: BENIGN FINDINGS. ROUTINE FOLLOW-UP MAMMOGRAPHY IS SUGGESTED IN ONE YEAR. Board Certified Radiologists. Accredited by the ACR and FDA. MAMMOGRAPHY IS VERY IMPORTANT TO YOUR HEALTH. THE AMERICAN CANCER SOCIETY GUIDELINES RECOMMEND THAT WOMEN 36YEARS OF AGE AND OLDER SHOULD HAVE A MAMMOGRAM EVERY YEAR. A REMINDER LETTER WILL BE SENT AT THE APPROPRIATE TIME.  THIS FACILITY UTILIZES A REMINDER SYSTEM TO ENSURE ALL PATIENTS RECEIVE REMINDER NOTIFICATIONS AT THE APPROPRIATE TIME BASED ON THE RECOMMENDATIONS OF THIS EXAM. THIS INCLUDES REMINDERS FOR ROUTINE  SCREENING MAMMOGRAMS, DIAGNOSTIC MAMMOGRAMS IN WHICH THE PATIENT IS ASKED TO RETURN FOR ADDITIONAL VIEWS, OR OTHER BREAST IMAGING INTERVENTIONS WHEN APPROPRIATE. THE PATIENT WILL BE PLACED IN THE APPROPRIATE REMINDER SYSTEM INCLUDING A REMINDER AT THE APPROPRIATE TIME FOR ANY PENDING ADDITIONAL VIEWS. Assessment:       ICD-10-CM    1. Carcinoma of upper-outer quadrant of left breast in female, estrogen receptor [facility]  C50.412     Z17.0    2. Obesity, Class I, BMI 30-34.9  E66.9    3. Depression, unspecified depression type  F32. Debora Bartlett, PhD, Psychology, [place]         Plan:     Patient is doing well. Recommend clinical breast exams in the breast surgical suite in 6 month(s)  Mammography in 6 month(s)  Recommend an active lifestyle, healthy diet, limited alcohol intake, achieve and maintain a healthy BMI to optimize breast cancer outcomes / decrease risk of breast cancer.   Referral to or Follow up with Medical Oncology  Referral to or Follow up with Radiation Oncology  Follow up with PCP regarding all medical problems  Recommend to follow up with Psychologist / [address] [place] [address], DISCUSSION, AND SUGGESTED FOLLOW UP:    We reviewed the natural history and genetic etiology of sporadic, familial and hereditary cancer syndromes. The patient's personal and family history is potentially suggestive of: Hereditary Breast and Ovarian Cancer Syndrome. The patient meets NCCN HBOC testing criteria based on her personal and family history. Her personal history of breast cancer and family history of PGM. We discussed that identification of a hereditary cancer syndrome may help her care providers tailor the patients medical management. If a mutation indicating Hereditary Breast and Ovarian Cancer Syndrome is detected in this case, the [address] [place] [address] recommendations would include increased cancer surveillance and prophylactic surgery options. If a mutation is detected, the patient will be referred back to the referring provider and to any additional appropriate care providers to discuss the relevant options. Inheritance of hereditary cancer syndromes was discussed with the patient. If a mutation is not found in the patient, this will decrease the likelihood of Hereditary Breast and Ovarian Cancer Syndrome as the explanation for her personal history. Cancer surveillance options would be discussed for the patient according to the appropriate standard [facility] guidelines, with consideration of their personal and family history risk factors. In this case, the patient will be referred back to their care providers for discussions of management. The patient was offered [employer] Hereditary Cancer test, BRCA 1 and BRCA 2 with 38 genes for Multi-Cancer. After considering the risks, benefits, and limitations, the patient chose to pursue and provided informed consent for the following testing:  [employer] Hereditary Cancer test, BRCA 1 and BRCA 2 with 38 genes for Multi-Cancer.      [address] - will refer to PCP for follow up regarding below measures    1. Annual Well Visits yes  2. Breast Cancer Screening yes  3. Colorectal Cancer Screening no-  she will speak to pcp  4. Depression Screening yes; will refer to therapy  5. HbAIC Control yes; patient on medications, follow up with pcp   6. Hypertension Control yes she is on medicines for it          Total face to face time was 40 minutes with greater than 50% spent on counseling the patient or coordinating her care. Dictated by Janet Beebe MD 3/7/2022 10:41 AM       This note was partially generated using Dragon voice recognition system, and there may be some incorrect words, spellings, punctuation that were not noticed in checking the note before saving.

## 2022-03-07 NOTE — PROGRESS NOTES
Reason for today's visit:  6 month follow up left breast cancer    Palpates a breast change?  yes - Left breast rope like area  Nipple discharge: no  Breast Pain: yes -  Left Breast  Breast Mass: no  Swelling in either upper extremity? no    Wellness:    Self breast self exams: yes   Regular exercise routine: no  Following Lymphedema awareness/precautions: yes   Managing stress:yes   Stress level on a scale of 1 - 10: 5,  was sick    Instruction:    Follow up per provider  Imaging per provider  Monthly self breast exams  Healthy diet  Exercise program  Lymphedema precautions/awareness    Other:    Requisitions needed:no  Bras/prosthesis: no  Compression Sleeve/glove: no  Lymphedema Measurements: see flow sheet  Therapy: no  PT/OT/Lymphedema: no  Follow up as advised per Dr Quesada Meth

## 2022-04-13 ENCOUNTER — TELEMEDICINE (OUTPATIENT)
Dept: BEHAVIORAL/MENTAL HEALTH CLINIC | Age: 67
End: 2022-04-13
Payer: MEDICARE

## 2022-04-13 DIAGNOSIS — F33.1 MAJOR DEPRESSIVE DISORDER, RECURRENT EPISODE, MODERATE DEGREE (HCC): Primary | ICD-10-CM

## 2022-04-13 PROCEDURE — 90791 PSYCH DIAGNOSTIC EVALUATION: CPT | Performed by: PSYCHOLOGIST

## 2022-04-13 ASSESSMENT — PATIENT HEALTH QUESTIONNAIRE - PHQ9
5. POOR APPETITE OR OVEREATING: 0
SUM OF ALL RESPONSES TO PHQ QUESTIONS 1-9: 15
8. MOVING OR SPEAKING SO SLOWLY THAT OTHER PEOPLE COULD HAVE NOTICED. OR THE OPPOSITE, BEING SO FIGETY OR RESTLESS THAT YOU HAVE BEEN MOVING AROUND A LOT MORE THAN USUAL: 0
SUM OF ALL RESPONSES TO PHQ9 QUESTIONS 1 & 2: 4
6. FEELING BAD ABOUT YOURSELF - OR THAT YOU ARE A FAILURE OR HAVE LET YOURSELF OR YOUR FAMILY DOWN: 3
2. FEELING DOWN, DEPRESSED OR HOPELESS: 1
9. THOUGHTS THAT YOU WOULD BE BETTER OFF DEAD, OR OF HURTING YOURSELF: 0
10. IF YOU CHECKED OFF ANY PROBLEMS, HOW DIFFICULT HAVE THESE PROBLEMS MADE IT FOR YOU TO DO YOUR WORK, TAKE CARE OF THINGS AT HOME, OR GET ALONG WITH OTHER PEOPLE: 1
4. FEELING TIRED OR HAVING LITTLE ENERGY: 3
7. TROUBLE CONCENTRATING ON THINGS, SUCH AS READING THE NEWSPAPER OR WATCHING TELEVISION: 3
SUM OF ALL RESPONSES TO PHQ QUESTIONS 1-9: 15
SUM OF ALL RESPONSES TO PHQ QUESTIONS 1-9: 15
3. TROUBLE FALLING OR STAYING ASLEEP: 2
1. LITTLE INTEREST OR PLEASURE IN DOING THINGS: 3
SUM OF ALL RESPONSES TO PHQ QUESTIONS 1-9: 15

## 2022-04-13 NOTE — PROGRESS NOTES
Behavioral Health Consultation  Key Russo, Ph.D., [facility]  Psychologist  4/13/22  10:39 AM EDT      Time spent with Patient: 30 minutes  This is patient's first  [facility] appointment. Reason for Consult:  depression, anxiety and stress  Referring Provider: Krissy Harvey DO    Pt (and/or legal guardian) provided informed consent for the behavioral health program. Discussed with patient model of service to include the limits of confidentiality (i.e. abuse reporting, suicide intervention, etc.) and short-term intervention focused approach. Pt (and/or legal guardian) indicated understanding. Feedback given to PCP. TELEHEALTH EVALUATION -- Audio and/or Visual (During SAFWI-52 public health emergency)    Due to COVID 19 outbreak, patient's office visit was converted to a virtual visit. Patient was contacted and agreed to proceed with a virtual visit via [employer]. Patient reports that they are located at home. Provider Location is at her office in [place]. The risks and benefits of converting to a virtual visit were discussed in light of the current infectious disease epidemic. Patient (and/or legal guardian) also understood that insurance coverage and co-pays are up to their individual insurance plans. Patient (and/or legal guardian) provides verbal consent for this visit to be billed to insurance. Pursuant to the emergency declaration under the [facility] [address] waiver authority and the [employer] Act, this Virtual  Visit was conducted, with patient's consent, to reduce the patient's risk of exposure to COVID-19 and provide continuity of care for an established patient. Services were provided through a video synchronous discussion virtually to substitute for in-person clinic visit. S:    Pt reports a history of MH treatment, last occurring last year and didn't find it helpful.  Pt was referred by Dr Spencer Staff related to her fatigue, depressed mood. Pt notes this to be a concern for the past 4 months or more. Pt notes that during that same time her 's health was worsening and she had a scare with a breast lump, had shingles, finds the holidays to be a \"bummer\", etc.    Pt lives with her spouse of 36 yrs and pets. Pt describes a \"fair\" relationship with spouse. Pt has one step child and describes a distant relationship. Pt is from this area, has one brother who lives locally, niece. Pt is not as close with other extended family. 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 Pt had heart failure that led to disability status. Pt has good friends in her life, is active on social media. Pt enjoys art. Pt is involved in a [employer] club but hasn't been attending lately, another [employer] club but also has not been active. Pt does not usually drink etoh, very infrequently. Pt denies all other substances. Pt currently takes cymbalta and lexapro and was Rx'd wellbutrin but has not yet started it. Pt notes the Xanax has been helpful but she is out of that medication. Pt notes she had a heart transplant 18 years ago. Pt notes that she is careful to avoid getting sick, but otherwise does not feel that it interfers with her ADLs at this time.          Pt denies SI/HI    O:  MSE:    Appearance    alert, cooperative  Appetite normal  Sleep disturbance Yes  Fatigue Yes  Loss of pleasure Yes  Impulsive behavior at times  Speech    spontaneous, normal rate and normal volume  Mood    Depressed  Affect    depressed affect  Thought Content    intact  Thought Process    coherent  Associations    logical connections  Insight    Fair  Judgment    Intact  Orientation    oriented to person, place, time, and general circumstances  Memory    recent and remote memory intact  Attention/Concentration    impaired  Morbid ideation No  Suicide Assessment    no suicidal ideation      History:    Medications:   Current Outpatient Medications   Medication Sig Dispense Refill    OZEMPIC, 0.25 OR 0.5 MG/DOSE, 2 MG/1.5ML SOPN Inject 1 Dose into the skin once a week      doxycycline monohydrate (ADOXA) 100 MG tablet Take 1 tablet by mouth daily as needed      amoxicillin (AMOXIL) 500 MG capsule Take 4 capsules by mouth daily as needed Prior to dental work      clotrimazole-betamethasone (LOTRISONE) 1-0.05 % cream Apply topically 2 times daily. 1 each 2    mycophenolate (CELLCEPT) 250 MG capsule Take 1 capsule by mouth 3 times daily      DULoxetine (CYMBALTA) 20 MG extended release capsule Take 1 capsule by mouth daily      Blood Glucose Monitoring Suppl (TRUE METRIX AIR GLUCOSE METER) w/Device KIT USE AS DIRECTED FOR CHECKING BLOOD SUGAR 4 TIMES DAILY.  TRUE METRIX BLOOD GLUCOSE TEST strip USE AS DIRECTED FOR CHECKING BLOOD SUGAR 4 TIMES DAILY.  TRUEplus Lancets 28G MISC USE AS DIRECTED FOR CHECKING BLOOD SUGAR 4 TIMES DAILY.  Multiple Vitamins-Minerals (OCUVITE ADULT FORMULA PO) Take by mouth      diclofenac sodium (VOLTAREN) 1 % GEL Apply 2 g topically 4 times daily      augmented betamethasone dipropionate (DIPROLENE-AF) 0.05 % cream Apply 1 Dose topically 2 times daily      insulin lispro (HUMALOG) 100 UNIT/ML injection vial Inject into the skin 3 times daily (before meals) Indications: sliding scale 15 units TID      ALPRAZolam (XANAX) 0.5 MG tablet Take 0.5 mg by mouth every 6 hours as needed for Sleep.  tacrolimus (PROGRAF) 0.5 MG capsule Take 0.5 mg by mouth 2 times daily      insulin glargine (LANTUS SOLOSTAR) 100 UNIT/ML injection pen Inject into the skin 2 times daily 45 units in the AM  55 at bedtime      omeprazole (PRILOSEC) 20 MG delayed release capsule Take 20 mg by mouth daily      doxycycline hyclate (VIBRA-TABS) 100 MG tablet Take 100 mg by mouth daily as needed       clindamycin-benzoyl peroxide (BENZACLIN) 1-5 % gel Apply topically nightly Apply topically 2 times daily.       pravastatin (PRAVACHOL) 10 MG tablet Take 10 mg by mouth nightly      lisinopril (PRINIVIL;ZESTRIL) 5 MG tablet Take 5 mg by mouth daily      escitalopram (LEXAPRO) 20 MG tablet Take 20 mg by mouth daily      diphenhydrAMINE (BENADRYL) 25 MG capsule Take 25 mg by mouth nightly as needed for Itching (2-4 tabs at bedtime prn)      nystatin (MYCOSTATIN) 621926 UNIT/GM ointment Apply topically 2 times daily Apply topically 2 times daily.  vitamin D (CHOLECALCIFEROL) 1000 UNIT TABS tablet Take 1,000 Units by mouth 2 times daily      tretinoin microspheres (RETIN-A MICRO) 0.1 % gel Apply topically nightly Apply topically nightly.  Simethicone (GAS-X EXTRA STRENGTH) 125 MG CAPS Take 1 capsule by mouth 3 times daily       No current facility-administered medications for this visit. Social History:   Social History     Socioeconomic History    Marital status:      Spouse name: Not on file    Number of children: Not on file    Years of education: Not on file    Highest education level: Not on file   Occupational History    Not on file   Tobacco Use    Smoking status: Former Smoker     Packs/day: 10.00     Quit date: 1986     Years since quittin.2    Smokeless tobacco: Never Used   Vaping Use    Vaping Use: Never used   Substance and Sexual Activity    Alcohol use: No     Comment: occ    Drug use: Never    Sexual activity: Yes     Partners: Male   Other Topics Concern    Not on file   Social History Narrative    Not on file     Social Determinants of Health     Financial Resource Strain:     Difficulty of Paying Living Expenses: Not on file   Food Insecurity:     Worried About [address] in the Last Year: Not on file    Matthieu of Food in the Last Year: Not on file   Transportation Needs:     Lack of Transportation (Medical): Not on file    Lack of Transportation (Non-Medical):  Not on file   Physical Activity:     Days of Exercise per Week: Not on file    Minutes of Exercise per Session: Not on file   Stress:     Feeling of Stress : Not on file   Social Connections:     Frequency of Communication with Friends and Family: Not on file    Frequency of Social Gatherings with Friends and Family: Not on file    Attends Gnosticism Services: Not on file    Active Member of Clubs or Organizations: Not on file    Attends Club or Organization Meetings: Not on file    Marital Status: Not on file   Intimate Partner Violence:     Fear of Current or Ex-Partner: Not on file    Emotionally Abused: Not on file    Physically Abused: Not on file    Sexually Abused: Not on file   Housing Stability:     Unable to Pay for Housing in the Last Year: Not on file    Number of [place] in the Last Year: Not on file    Unstable Housing in the Last Year: Not on file       TOBACCO:   reports that she quit smoking about 36 years ago. She smoked 10.00 packs per day. She has never used smokeless tobacco.  ETOH:   reports no history of alcohol use. Family History:   Family History   Problem Relation Age of Onset    Breast Cancer Paternal Grandmother     Cancer Paternal Grandmother          A:  Administered the PHQ9 which indicates a self report of moderately severe symptom distress. Pt would benefit from [facility] services to increase coping skills to provide symptom management/control/relief.        PHQ Scores 4/13/2022 12/31/2020   PHQ2 Score 4 0   PHQ9 Score 15 0     Interpretation of Total Score Depression Severity: 1-4 = Minimal depression, 5-9 = Mild depression, 10-14 = Moderate depression, 15-19 = Moderately severe depression, 20-27 = Severe depression      Diagnosis:    Major depressive disorder; recurrent and moderate      Diagnosis Date    Anxiety     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  s/p partial mastectomy    Esophageal reflux     H/O heart transplant ([facility] 2004    for dilated cardiomyopathy     History of therapeutic radiation     HPV (human papilloma virus) anogenital infection     Hyperlipidemia     Hypertension     Kidney stones     Lumbago     Multiple nevi     KARMEN (obstructive sleep apnea)     Osteoarthritis     Type 2 diabetes mellitus without complication ([facility]            Plan:  Pt interventions:  Established rapport, Conducted functional assessment, Forman-setting to identify pt's primary goals for [facility] visit / overall health, Supportive techniques and Provided Psychoeducation re: [facility] services      Pt Behavioral Change Plan:    1. Follow up as scheduled to finish establishing care    Please note this report has been partially produced using speech recognition software  And may cause contain errors related to that system including grammar, punctuation and spelling as well as words and phrases that may seem inappropriate. If there are questions or concerns please feel free to contact me to clarify.

## 2022-04-20 ENCOUNTER — TELEPHONE (OUTPATIENT)
Dept: SURGERY | Age: 67
End: 2022-04-20

## 2022-04-20 NOTE — TELEPHONE ENCOUNTER
Patient was informed that her genetic test results are negative for a deleterious mutation. A negative test result does not mean her risk for breast cancer is not elevated. Her risk may still be elevated and continue the current treatment / follow up plan as previously recommended by Dr. Mariely Langley. She will receive a copy of the test results in the mail. In the information packet, there is information to contact a genetic counselor. This is free of charge and Dr. Mariely Langley highly recommends all her patients to contact them. Please call the office at [phone] with any questions. The patient verbalized understanding of her negative genetic testing results and has no questions at this time.

## 2022-04-21 ENCOUNTER — TELEMEDICINE (OUTPATIENT)
Dept: BEHAVIORAL/MENTAL HEALTH CLINIC | Age: 67
End: 2022-04-21
Payer: MEDICARE

## 2022-04-21 DIAGNOSIS — F33.1 MAJOR DEPRESSIVE DISORDER, RECURRENT EPISODE, MODERATE DEGREE (HCC): Primary | ICD-10-CM

## 2022-04-21 PROCEDURE — 90832 PSYTX W PT 30 MINUTES: CPT | Performed by: PSYCHOLOGIST

## 2022-04-21 ASSESSMENT — PATIENT HEALTH QUESTIONNAIRE - PHQ9
SUM OF ALL RESPONSES TO PHQ9 QUESTIONS 1 & 2: 6
7. TROUBLE CONCENTRATING ON THINGS, SUCH AS READING THE NEWSPAPER OR WATCHING TELEVISION: 3
4. FEELING TIRED OR HAVING LITTLE ENERGY: 3
9. THOUGHTS THAT YOU WOULD BE BETTER OFF DEAD, OR OF HURTING YOURSELF: 0
10. IF YOU CHECKED OFF ANY PROBLEMS, HOW DIFFICULT HAVE THESE PROBLEMS MADE IT FOR YOU TO DO YOUR WORK, TAKE CARE OF THINGS AT HOME, OR GET ALONG WITH OTHER PEOPLE: 2
3. TROUBLE FALLING OR STAYING ASLEEP: 0
SUM OF ALL RESPONSES TO PHQ QUESTIONS 1-9: 16
6. FEELING BAD ABOUT YOURSELF - OR THAT YOU ARE A FAILURE OR HAVE LET YOURSELF OR YOUR FAMILY DOWN: 1
8. MOVING OR SPEAKING SO SLOWLY THAT OTHER PEOPLE COULD HAVE NOTICED. OR THE OPPOSITE, BEING SO FIGETY OR RESTLESS THAT YOU HAVE BEEN MOVING AROUND A LOT MORE THAN USUAL: 2
5. POOR APPETITE OR OVEREATING: 1
SUM OF ALL RESPONSES TO PHQ QUESTIONS 1-9: 16
2. FEELING DOWN, DEPRESSED OR HOPELESS: 3
1. LITTLE INTEREST OR PLEASURE IN DOING THINGS: 3

## 2022-04-21 NOTE — PROGRESS NOTES
Behavioral Health Consultation  Key Crowder, Ph.D., [facility]  Psychologist  4/21/22  10:07 AM EDT      Time spent with Patient: 30 minutes  This is patient's second  [facility] appointment. Reason for Consult:  depression, anxiety, stress and chronic health concerns  Referring Provider: Onel Keyes,       Feedback given to referring provider. TELEHEALTH EVALUATION -- Audio and/or Visual (During QTWZW-57 public health emergency)    Due to COVID 19 outbreak, patient's office visit was converted to a virtual visit. Patient was contacted and agreed to proceed with a virtual visit via [employer]. Patient reports that they are located at home. Provider Location is at her office in [place]. The risks and benefits of converting to a virtual visit were discussed in light of the current infectious disease epidemic. Patient (and/or legal guardian) also understood that insurance coverage and co-pays are up to their individual insurance plans. Patient (and/or legal guardian) provides verbal consent for this visit to be billed to insurance. Pursuant to the emergency declaration under the [facility], [facility] waiver authority and the [employer] Act, this Virtual  Visit was conducted, with patient's consent, to reduce the patient's risk of exposure to COVID-19 and provide continuity of care for an established patient. Services were provided through a video synchronous discussion virtually to substitute for in-person clinic visit. S:    Pt reports she feels tired and details that she had a kidney stone that moved and she became septic. Pt was hospitalized for three days and is not yet feeling much relief yet. Pt has a urology appt tomorrow. Pt notes the health concerns impact on her ADLs, support from spouse. Reviewed current relaxation practice.          Pt denies SI/HI    O:  MSE:    Appearance    alert, cooperative  Appetite normal  Sleep disturbance No, some recent frequency of urination  Fatigue Yes  Loss of pleasure Yes  Impulsive behavior Yes  Speech    spontaneous, normal rate and normal volume  Mood    Depressed  Affect    depressed affect  Thought Content    intact  Thought Process    coherent  Associations    logical connections  Insight    Fair  Judgment    Intact  Orientation    oriented to person, place, time, and general circumstances  Memory    recent and remote memory intact  Attention/Concentration    Intact in appt but reports as a concern  Morbid ideation No  Suicide Assessment    no suicidal ideation      History:    Medications:   Current Outpatient Medications   Medication Sig Dispense Refill    OZEMPIC, 0.25 OR 0.5 MG/DOSE, 2 MG/1.5ML SOPN Inject 1 Dose into the skin once a week      doxycycline monohydrate (ADOXA) 100 MG tablet Take 1 tablet by mouth daily as needed      amoxicillin (AMOXIL) 500 MG capsule Take 4 capsules by mouth daily as needed Prior to dental work      clotrimazole-betamethasone (LOTRISONE) 1-0.05 % cream Apply topically 2 times daily. 1 each 2    mycophenolate (CELLCEPT) 250 MG capsule Take 1 capsule by mouth 3 times daily      DULoxetine (CYMBALTA) 20 MG extended release capsule Take 1 capsule by mouth daily      Blood Glucose Monitoring Suppl (TRUE METRIX AIR GLUCOSE METER) w/Device KIT USE AS DIRECTED FOR CHECKING BLOOD SUGAR 4 TIMES DAILY.  TRUE METRIX BLOOD GLUCOSE TEST strip USE AS DIRECTED FOR CHECKING BLOOD SUGAR 4 TIMES DAILY.  TRUEplus Lancets 28G MISC USE AS DIRECTED FOR CHECKING BLOOD SUGAR 4 TIMES DAILY.       Multiple Vitamins-Minerals (OCUVITE ADULT FORMULA PO) Take by mouth      diclofenac sodium (VOLTAREN) 1 % GEL Apply 2 g topically 4 times daily      augmented betamethasone dipropionate (DIPROLENE-AF) 0.05 % cream Apply 1 Dose topically 2 times daily      insulin lispro (HUMALOG) 100 UNIT/ML injection vial Inject into the skin 3 times daily (before meals) Indications: sliding scale 15 units TID      ALPRAZolam (XANAX) 0.5 MG tablet Take 0.5 mg by mouth every 6 hours as needed for Sleep.  tacrolimus (PROGRAF) 0.5 MG capsule Take 0.5 mg by mouth 2 times daily      insulin glargine (LANTUS SOLOSTAR) 100 UNIT/ML injection pen Inject into the skin 2 times daily 45 units in the AM  55 at bedtime      omeprazole (PRILOSEC) 20 MG delayed release capsule Take 20 mg by mouth daily      doxycycline hyclate (VIBRA-TABS) 100 MG tablet Take 100 mg by mouth daily as needed       clindamycin-benzoyl peroxide (BENZACLIN) 1-5 % gel Apply topically nightly Apply topically 2 times daily.  pravastatin (PRAVACHOL) 10 MG tablet Take 10 mg by mouth nightly      lisinopril (PRINIVIL;ZESTRIL) 5 MG tablet Take 5 mg by mouth daily      escitalopram (LEXAPRO) 20 MG tablet Take 20 mg by mouth daily      diphenhydrAMINE (BENADRYL) 25 MG capsule Take 25 mg by mouth nightly as needed for Itching (2-4 tabs at bedtime prn)      nystatin (MYCOSTATIN) 171427 UNIT/GM ointment Apply topically 2 times daily Apply topically 2 times daily.  vitamin D (CHOLECALCIFEROL) 1000 UNIT TABS tablet Take 1,000 Units by mouth 2 times daily      tretinoin microspheres (RETIN-A MICRO) 0.1 % gel Apply topically nightly Apply topically nightly.  Simethicone (GAS-X EXTRA STRENGTH) 125 MG CAPS Take 1 capsule by mouth 3 times daily       No current facility-administered medications for this visit.        Social History:   Social History     Socioeconomic History    Marital status:      Spouse name: Not on file    Number of children: Not on file    Years of education: Not on file    Highest education level: Not on file   Occupational History    Not on file   Tobacco Use    Smoking status: Former Smoker     Packs/day: 10.00     Quit date: 1986     Years since quittin.3    Smokeless tobacco: Never Used   Vaping Use    Vaping Use: Never used   Substance and Sexual Activity    Alcohol use: No     Comment: occ    Drug use: Never    Sexual activity: Yes     Partners: Male   Other Topics Concern    Not on file   Social History Narrative    Not on file     Social Determinants of Health     Financial Resource Strain:     Difficulty of Paying Living Expenses: Not on file   Food Insecurity:     Worried About Running Out of Food in the Last Year: Not on file    Matthieu of Food in the Last Year: Not on file   Transportation Needs:     Lack of Transportation (Medical): Not on file    Lack of Transportation (Non-Medical): Not on file   Physical Activity:     Days of Exercise per Week: Not on file    Minutes of Exercise per Session: Not on file   Stress:     Feeling of Stress : Not on file   Social Connections:     Frequency of Communication with Friends and Family: Not on file    Frequency of Social Gatherings with Friends and Family: Not on file    Attends Mosque Services: Not on file    Active Member of [address] [place] [address] or Organizations: Not on file    Attends Club or Organization Meetings: Not on file    Marital Status: Not on file   Intimate Partner Violence:     Fear of Current or Ex-Partner: Not on file    Emotionally Abused: Not on file    Physically Abused: Not on file    Sexually Abused: Not on file   Housing Stability:     Unable to Pay for Housing in the Last Year: Not on file    Number of [place] in the Last Year: Not on file    Unstable Housing in the Last Year: Not on file       TOBACCO:   reports that she quit smoking about 36 years ago. She smoked 10.00 packs per day. She has never used smokeless tobacco.  ETOH:   reports no history of alcohol use. Family History:   Family History   Problem Relation Age of Onset    Breast Cancer Paternal Grandmother     Cancer Paternal Grandmother          A:  Administered the PHQ9 which indicates a self report of moderately severe symptom distress.  Pt would benefit from [address] [place] [address] services to increase coping skills to provide symptom management/control/relief. 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  s/p partial mastectomy    Esophageal reflux     H/O heart transplant ([facility] 2004    for dilated cardiomyopathy     History of therapeutic radiation     HPV (human papilloma virus) anogenital infection     Hyperlipidemia     Hypertension     Kidney stones     Lumbago     Multiple nevi     KARMEN (obstructive sleep apnea)     Osteoarthritis     Type 2 diabetes mellitus without complication ([facility]            Plan:  Pt interventions:  Conducted functional assessment, Benson-setting to identify pt's primary goals for [facility] visit / overall health, Supportive techniques, Provided Psychoeducation re: anxiety, depression and stress management, Explained relaxed breathing technique in detail and practiced this with pt in visit, Emphasized importance of regular practice of relaxation strategies to target / promote symptom relief and Provided pt list of websites and several smartphone charis resources for further practicing guided meditations and breathing exercises      Pt Behavioral Change Plan:    1. Dedicate 5 minutes 3x/day to practice the deep breathing    2. Review the list of apps and give some a try! [facility] Box, [employer]  and progressive muscle relaxation for sleep, etc.)    3. Read the below information about stress management    4.  F/U as scheduled    Please note this report has been partially produced using speech recognition software  And may cause contain errors related to that system including grammar, punctuation and spelling as well as words and phrases that may seem inappropriate. If there are questions or concerns please feel free to contact me to clarify.

## 2022-04-21 NOTE — PATIENT INSTRUCTIONS
1. Dedicate 5 minutes 3x/day to practice the deep breathing    2. Review the list of apps and give some a try! [facility] Box, CBTi  and progressive muscle relaxation for sleep, etc.)    3. Read the below information about stress management    4. F/U as scheduled    Zohreh Hobson [phone]    \"The entire autonomic nervous system (and through it, our internal organs and glands) is largely driven by our breathing patterns. By changing our breathing we can influence millions of biochemical reactions in our body, producing more relaxing substances such as endorphins and fewer anxiety-producing ones like adrenaline and higher blood acidity. Mindfulness of the breath is so effective that it is common to all meditative and prayer traditions. \" Anxiety Fear & Breathing - Breathing. com    \"When overcoming high levels of anxiety, it is important to learn the techniques of correct breathing. Many people who live with high levels of anxiety are known to breathe through their chest. Shallow breathing through the chest means you are disrupting the balance of oxygen and carbon dioxide necessary to be in a relaxed state. This type of breathing will perpetuate the symptoms of anxiety. \" [employer]. com      Diaphragmatic Breathing             _____________________________________________________________________________  1. Sit in a comfortable position    2. Place one hand on your stomach and the other on your chest    3. Try to breathe so that only your stomach rises and falls    As you inhale, concentrate on your chest remaining relatively still while your stomach rises. It may be helpful for you to imagine that your pants are too big and you need to push your stomach out to hold them up. When exhaling, allow your stomach to fall in and the air to fully escape. Inhale slowly. You may choose to hold the air in for about a second. Exhale slowly.   Dont push the air out, but just let the natural pressure of your body slowly move it out. It is normal for this healthy method of breathing to feel a little awkward at first.  With practice, it will feel more natural.    4. Get your mind on your side      One other important factor in getting relaxed is your mind. Your mind and body are connected. The mind influences the body and the body influences the mind. What you do with your mind when you are trying to relax is very important. The key is to avoid thinking about stressful things. You can think about       Neutral things (e.g., counting, saying a word like calm or relax)    Pleasant things (e.g., imagining a pleasant place)     5. It is recommended that you practice 2 times per day, 10 minutes each time. ----------------------------------------------------------------------------------------------------------------------  ----------------------------------------------------------------------------------------------------------------------  ----------------------------------------------------------------------------------------------------------------------  ----------------------------------------------------------------------------------------------------------------------      CONTROLLED or MEASURED BREATHING EXERCISE: (YOU MAY WANT TO DOWNLOAD THE FREE CINDY \"VIRTUAL HOPE BOX\" TO PRACTICE THIS EXERCISE)     You can sit or stand, but be sure to soften up a little before you begin. Make sure your hands are relaxed, and your knees are soft.  Drop your shoulders and let your jaw relax.  Now breath in slowly through your nose and count to three, keep your shoulders down and allow your stomach to expand as you breathe in.   Hold the breath for a moment.  Now release your breath slowly and smoothly as you count to six.    Repeat for a couple of minutes        It can be very helpful to use tools like relaxed breathing, muscle relaxation, and guided imagery/visualization to cope with stress, pain, anxiety and depression. I recommend to all my patients that they try different techniques to find the ones that work best for them. Below are 2 websites that have several breathing, relaxation, and visualization exercises that you can listen to and download for free.    NetworkAdspert | [email or web address] [email or web address]. html  · Deep Breathing & Guided Relaxation Exercises (3)  · Guided Imagery/Visualization Exercises (5)  · Mindfulness & Meditation Exercises (3)  · Progressive Muscle Relaxation   · Soothing Instrumental Music (11)    [email or web address]  · Diaphragmatic Breathing   · Deep Breathing I   · Deep Breathing II   · Progressive Muscle Relaxation   · Guided Imagery: [employer]   · Guided Imagery: [facility]   · Relaxing Phrases   · Just This Breath   · Increasing Awareness   · Sending Thoughts Away on Clouds  · Sending Thoughts Away on Leaves  · Sorting Into Boxes     -----------------------------------------------------  Below are several apps that you can download to your smartphone to help with relaxation and mood coping. [id number]  Platform: Call Loop & LOANZ  Cost: Free  Your breathing has a profound effect on your body. Deja Clarke know this fact to be true if youve ever taken deep breaths to calm yourself down when you were upset. That exercise can often make you feel more centered, and its proof that breathing is powerful. The [id number] charis uses guided breathing exercises to help reduce symptoms of an anxiety attack. If an attack is coming or the symptoms are unbearable, slip away into a quiet room, open your charis, and let the worry and stress slip away with each breath. Universal Breathing - Pranayama Free  Platform: ReferralCandy  Cost: Free  Focused breathing exercises can help you regain composure during an anxiety attack. They can also help you prevent an anxiety attack before one starts. Pranayama breathing techniques are common in yoga and have powerful benefits.  If youre a beginner, you can benefit from the chariss guided breathing instruction. Severiano John learn how to breathe deeply, hold, and then release with better control. If it works for you, you can purchase the full course which gives you access to the entire program.  Breathing Zone   Platform: Membersuitehone & Android  Cost: $3.99   Breathing Zone uses a clinically proven therapeutic breathing exercise that decreases your heart rate, and with daily use can help manage high blood pressure.    ----------------------------------------------  Self-Help for Anxiety Management (DARIO)  Platform: iPhone & Android  Cost: Free  The Self-Help for Anxiety Management (DARIO) charis from the [employer] can help you regain control of your anxiety and emotions. Tell the charis how youre feeling, how anxious you are, or how worried you are. Then let the chariss self-help features walk you through some calming or relaxation practices. If you want, you can connect with a social network of other [facility] users. Dont worry, the network isnt connected to larger networks like Twitter or [employer]. Stop Panic & Anxiety Help  Platform: Android  Cost: Free  If panic and anxiety attacks have a  on your life, this charis might help you let them go. The Stop Panic & Anxiety Help Android charis uses emotion and relaxation training audio tracks to help you fight your fears and find a state of calm. When youve overcome the attack, use the Adchemy journal to record what caused the attack and how you were able to get through it. Then use this journal to learn from your experiences and prepare for the future. I Can Be Fearless by Human Progress  Platform: Appsco  Cost: Free  When you were younger, your parents might have told you that you could do anything you put your mind to. This charis might not help you be an astronaut or a world famous actress, but it can help you break through your anxiety, fears, and worries to a place of calm and confidence.  Open your Apple device and select what you want to be right now  calm, motivated, and confident are among the options  then let the audio hypnosis guide you through a session. Anti-Anxiety   Platform: Android  Cost: Free  You can tell the charis your problems by taking a diagnostic quiz about your level of stress and anxiety. Using your answers, the charis will design a custom treatment plan for you. Follow instructional self-help videos like How to Tolerate and Lessen Anxiety.  Keep a daily journal of your anxiety and worries, and track your progress as you learn to regain a sense of calm. Worry Box  Anxiety Self-Help  Platform: Android  Cost: Free  Have you ever wished you could put all your worries in a box, leave them there, and walk away? The Worry Box charis may let you do just that. The charis functions a lot like a journal: Write down your thoughts, anxieties, and worries, and let the charis help you think them through. It will ask questions, give specific anxiety-reducing help, and can even direct you to help you reduce your worries and anxiety. It is all password protected, so you can feel safe sharing the details of your stresses. -----------------------------------------------  Relax Melodies  Platform: iPhone and Android  Cost: Free  Anxiety can disrupt healthy sleep patterns in more than one way. First, people who dont get enough sleep tend to feel more anxious. Then, people who are more anxious have a difficult time sleeping. Creating a calming environment may help you fall asleep and stay asleep. Relax to one of this chariss 50 sounds. Need the music to stop once youre asleep? Set a timer, and it will stop playing. Set an alarm when you need to be awake. Then, enjoy the benefits of a good nights sleep, free from anxiety. Relaxing Sounds of Nature - Lite  Platform: iPhone  Cost: Free  You can find rest and relaxation without having to travel.  The charis comes with 35 nature tracks, which include soothing classics like crickets chirping, breaking waves, and a serene lake. You can download more free tracks to SensorTran, and customize a favorite combination that helps you reduce your anxiety in a peaceful setting. Allow the sounds of nature to sweep you away from your worries in the comfort of your living room, office, or bedroom. Nature Sounds Relax and Sleep  Platform: Android  Cost: Free  If you find yourself longing for the sound of the ocean to help you relax, the Gigi Hannifin and Sleep charis is for you. Open this charis whenever youre feeling anxious or stressed. You can select locations or sounds like the jungle, ocean, or thunder and slip away into a place of relaxation and comfort. If the sounds make you feel sleepy, even better. Use the charis to doze off into a relaxing slumber  Calming Music to Simplicity  Platform: Android  Cost: Free  [employer] arent the only relaxing tunes in smartphone apps. Music, especially some traditional Shock Treatment Management music, can be relaxing and soothing. The Calming Music to Simplicity charis contains nine traditional Shock Treatment Management music selections. Press play and let your worries melt away. Relax Ocean Waves Sleep by Teramindce  Platform: Android  Cost: Free  Living far from a beach doesnt mean you have to be far from total relaxation. Slip on a set of headphones and drift into a distant sand-and-suds oasis. Whether youre trying to head off an anxiety attack or just need to get some good sleep after a few anxious days, the Relax Ocean Waves Sleep charis helps you find a place of serenity.  --------------------------------------------------------------  Saad Spencer Meditation Relaxation  Platform: Android  Cost: Free  Saad Spencer is a traditional [facility] health system that brings together posture, breathing, and the mind to reduce anxiety. This Android charis connects users with a library of relaxation videos that contain instructions for relaxing and clearing the mind.  The videos are created by Dr. Jesus Alberto Stein Karthik Salmon, a psychologist with more than 20 years of experience. In addition to viewing Dr. Delmi Bernstein videos, you can read a variety of articles related to anxiety, meditation, and stress management. Anxiety Free  Platform: Sequenom   Cost: Free  Meditation requires mindfulness and a sense of presence in your thoughts. Hypnosis is one step beyond meditation. It works by sending signals to your brain and transforming it almost unknowingly. The creators of this charis say that its audio recordings contain subliminal signals that speak to the subconscious with powerful effect.  Hypnosis, like meditation, requires practice, and the goal is to get each user to a place where self-hypnosis is possible in order to reduce anxiety. Relax & Rest Guided Meditations  Platform: iPhone and Android  Cost: $0.99  While group meetings and discussions are always an option, some people find relaxation more easily on their own. This charis lets you relax in the space of your own home or office with three guided meditations. Breath Awareness Guided Meditation (5 minutes), Deep Rested Guided Meditation (13 minutes), and Whole Body Guided Relaxation (24 minutes) are designed specifically to help you relax and sink into a peaceful meditation moment. Equanimity - Meditation Timer & Tracker  Platform: iPRagingWirene   Cost: $4.99  Meditation is one way you can cope with the symptoms and side effects of anxiety. People who meditate can eventually train themselves to stay calm when feeling stressed or anxious. Equanimity  Meditation Timer & Tracker is simple and straightforward. Time each session and watch for visual light cues to let you know how long youve been meditating. Take notes about each session, and watch your progress as you learn to manage your stress and anxiety.   Virtual Hope Box  Platform: Sequenom and InterEx  Cost: Free  The [facility] Box (VHB) is a smartphone application that contains simple tools to help with coping, relaxation, distraction, and positive thinking via personalized supportive audio, video, pictures, games, mindfulness exercises, positive messages and activity planning, inspirational quotes, coping statements, and other tools. Acupressure: Heal Yourself  Platform: iPhone and Android  Cost: $1.99  Acupressure is a natural healing strategy in which you target specific areas of the body in order to alleviate pain or unwanted symptoms. Acupressure can also increase blood flow, which can boost your mood and your health. This charis helps you find your bodys acupressure points. Apply pressure on those points when youre feeling overwhelmed, and receive the positive, calming benefit  PTSD : Self-Management of Posttraumatic Stress  Platform: iPhone and Android  Cost: Free  This charis can help you learn about and manage symptoms that often occur after trauma. Provides information and coping skills for common kinds of posttraumatic stress symptoms and problems, including systematic relaxation and self-help techniques. [place]: Feel better and live happier today  Platform: iPhone  Cost: Free  Daily mood and health tracking as well as journaling. Activities are based on mindfulness, relaxation and Cognitive Behavioral Therapy. Online support, networking and emails. CBT-i : Cognitive Behavioral Therapy for Insomnia  Platform: iPhone  Cost: Free  Identify sleep patterns with a sleep diary and assessment, tools to create new sleep habits, quiet your mind and prevent insomnia in the future. You can set reminders and learn about healthy sleep habits. Mindfulness   Platform: iPhone  Cost: Free  Mindfulness practice to decrease stress, manage emotional discomfort, depression, physical pain, and other problems. It offers exercises, information, and a tracking log to that you can optimize practice.    Unbounce  Platform: iPhone  Cost: Free for the first month then $5.99/month for full access  Unbounce is a platform to discover

## 2022-09-12 ENCOUNTER — OFFICE VISIT (OUTPATIENT)
Dept: SURGERY | Age: 67
End: 2022-09-12
Payer: MEDICARE

## 2022-09-12 VITALS
TEMPERATURE: 97.2 F | BODY MASS INDEX: 29.52 KG/M2 | OXYGEN SATURATION: 99 % | HEART RATE: 100 BPM | SYSTOLIC BLOOD PRESSURE: 132 MMHG | DIASTOLIC BLOOD PRESSURE: 70 MMHG | RESPIRATION RATE: 16 BRPM | WEIGHT: 206.2 LBS | HEIGHT: 70 IN

## 2022-09-12 DIAGNOSIS — C50.412 CARCINOMA OF UPPER-OUTER QUADRANT OF LEFT BREAST IN FEMALE, ESTROGEN RECEPTOR POSITIVE (HCC): Primary | ICD-10-CM

## 2022-09-12 DIAGNOSIS — Z17.0 CARCINOMA OF UPPER-OUTER QUADRANT OF LEFT BREAST IN FEMALE, ESTROGEN RECEPTOR POSITIVE (HCC): Primary | ICD-10-CM

## 2022-09-12 DIAGNOSIS — E66.3 OVERWEIGHT (BMI 25.0-29.9): ICD-10-CM

## 2022-09-12 DIAGNOSIS — Z12.31 ENCOUNTER FOR SCREENING MAMMOGRAM FOR BREAST CANCER: ICD-10-CM

## 2022-09-12 PROBLEM — I16.0 HYPERTENSIVE URGENCY: Status: ACTIVE | Noted: 2022-07-25

## 2022-09-12 PROBLEM — Z86.16 HISTORY OF COVID-19: Status: ACTIVE | Noted: 2022-08-18

## 2022-09-12 PROBLEM — R42 POSTURAL DIZZINESS WITH PRESYNCOPE: Status: ACTIVE | Noted: 2022-05-02

## 2022-09-12 PROBLEM — S52.531A FRACTURE, COLLES, RIGHT, CLOSED: Status: ACTIVE | Noted: 2022-08-16

## 2022-09-12 PROBLEM — R55 POSTURAL DIZZINESS WITH PRESYNCOPE: Status: ACTIVE | Noted: 2022-05-02

## 2022-09-12 PROBLEM — G43.909 MIGRAINES: Status: ACTIVE | Noted: 2022-08-18

## 2022-09-12 PROBLEM — M47.816 LUMBAR SPONDYLOSIS: Status: ACTIVE | Noted: 2022-04-11

## 2022-09-12 PROCEDURE — G8400 PT W/DXA NO RESULTS DOC: HCPCS | Performed by: SURGERY

## 2022-09-12 PROCEDURE — G8427 DOCREV CUR MEDS BY ELIG CLIN: HCPCS | Performed by: SURGERY

## 2022-09-12 PROCEDURE — G8417 CALC BMI ABV UP PARAM F/U: HCPCS | Performed by: SURGERY

## 2022-09-12 PROCEDURE — 99214 OFFICE O/P EST MOD 30 MIN: CPT | Performed by: SURGERY

## 2022-09-12 PROCEDURE — 3017F COLORECTAL CA SCREEN DOC REV: CPT | Performed by: SURGERY

## 2022-09-12 PROCEDURE — G9899 SCRN MAM PERF RSLTS DOC: HCPCS | Performed by: SURGERY

## 2022-09-12 PROCEDURE — 1090F PRES/ABSN URINE INCON ASSESS: CPT | Performed by: SURGERY

## 2022-09-12 PROCEDURE — 1123F ACP DISCUSS/DSCN MKR DOCD: CPT | Performed by: SURGERY

## 2022-09-12 PROCEDURE — 1036F TOBACCO NON-USER: CPT | Performed by: SURGERY

## 2022-09-12 RX ORDER — ONDANSETRON HYDROCHLORIDE 8 MG/1
8 TABLET, FILM COATED ORAL EVERY 8 HOURS PRN
COMMUNITY
Start: 2022-05-26

## 2022-09-12 RX ORDER — ASCORBIC ACID 500 MG
1 TABLET ORAL DAILY
COMMUNITY
Start: 2022-08-19

## 2022-09-12 RX ORDER — SUMATRIPTAN 50 MG/1
1 TABLET, FILM COATED ORAL PRN
COMMUNITY
Start: 2022-08-12

## 2022-09-12 RX ORDER — SITAGLIPTIN 100 MG/1
1 TABLET, FILM COATED ORAL DAILY
COMMUNITY
Start: 2022-07-20

## 2022-09-12 RX ORDER — ALPRAZOLAM 1 MG/1
1 TABLET, EXTENDED RELEASE ORAL DAILY PRN
COMMUNITY
Start: 2022-08-16

## 2022-09-12 RX ORDER — INSULIN DEGLUDEC 200 U/ML
100 INJECTION, SOLUTION SUBCUTANEOUS EVERY MORNING
COMMUNITY
Start: 2022-07-20

## 2022-09-12 RX ORDER — FAMOTIDINE 40 MG/1
1 TABLET, FILM COATED ORAL NIGHTLY
COMMUNITY
Start: 2022-08-28

## 2022-09-12 RX ORDER — TOPIRAMATE 25 MG/1
25 TABLET ORAL NIGHTLY
COMMUNITY
Start: 2022-08-12

## 2022-09-12 RX ORDER — INSULIN LISPRO 100 [IU]/ML
20 INJECTION, SOLUTION INTRAVENOUS; SUBCUTANEOUS
COMMUNITY
Start: 2022-08-28

## 2022-09-12 RX ORDER — MAGNESIUM OXIDE 400 MG/1
400 TABLET ORAL DAILY
COMMUNITY
Start: 2022-04-21

## 2022-09-12 NOTE — PROGRESS NOTES
Reason for today's visit:   6 month follow up left breast cancer    Palpates a breast change? no  Nipple discharge: no  Breast Pain: yes - upper left breast and left lateral breast  Breast Mass: no  Swelling in either upper extremity? no    Wellness:    Self breast self exams: yes   Regular exercise routine: yes   Following Lymphedema awareness/precautions: yes   Managing stress:yes   Stress level on a scale of 1 - 10: 6,   has dementia, caregiver    Instruction:    Follow up per provider  Imaging per provider  Monthly self breast exams  Healthy diet  Exercise program  Lymphedema precautions/awareness    Other:    Requisitions needed:no  Bras/prosthesis: no  Compression Sleeve/glove: no  Lymphedema Measurements: see flow sheet  Therapy: no  PT/OT/Lymphedema: no  Follow up as advised per Dr Sandra Dickens  Lymphedema Screening 9/12/2022 3/7/2022 8/30/2021   RIGHT 10 cm (Hand) - - 19.2   RIGHT 20 cm (Wrist) - - 19   RIGHT 30 cm (Forearm) - - 24.5   RIGHT 60 cm (Upper Arm) - - 34   LEFT 10 cm (Hand) 19 19.5 -   LEFT 20 cm (Wrist) 18.5 18.5 -   LEFT 30 cm (Forearm) 22 21.5 -   LEFT 60 cm (Upper Arm) 32 33.5 -

## 2022-09-12 NOTE — PATIENT INSTRUCTIONS
Patient Education        Preventing Lymphedema After Treatment for Breast Cancer: Care Instructions  Your Care Instructions     Lymphedema is a buildup of fluid in the soft tissues of the body. It can happen in the arm after breast cancer surgery to remove lymph nodes. If there are few or no lymph nodes, fluid can build up in the arm. It can also happen if the lymph system in an arm has been damaged. Infection, tumors, and scar tissuefrom radiation therapy to the armpit area also can cause fluid to build up. You may be able to avoid lymphedema or keep it under control by following the tips below. Make sure that you take good care of the skin on your arm and hand. Your skin acts as a barrier to keep out bacteria and prevent infection. It is also important not to overuse the muscles in the arm. And don't expose your armto very hot or cold temperatures. Lymphedema can happen soon after breast cancer treatment. Or it may happen many years later. It may affect only part of your arm or hand. In some cases, it affects all of the arm. Make sure to follow these precautions even after you finish treatment. Do not ignore tightness or swelling in or around your arm or hand. You are less likely to have long-term problems if you get these symptomstreated right away. Follow-up care is a key part of your treatment and safety. Be sure to make and go to all appointments, and call your doctor if you are having problems. It's also a good idea to know your test results and keep alist of the medicines you take. How can you care for yourself at home? Skin care    Keep your arm, hand, and armpit clean. Use a mild soap that does not dry out your skin. Moisturize your skin often. Take good care of the skin around your fingernails. Do not bite or cut your cuticles. Ask your doctor how to handle any cuts, scratches, insect bites, or other injuries you may get.      Use sunscreen and insect repellent outdoors to protect your skin from sunburn and insect bites. Use an electric razor if you shave your armpit. It's less likely to cut or irritate your skin. Activity    Don't wear clothing or jewelry that is tight on your arm or hand. Your doctor may advise you not to wear a watch or rings on the affected hand. Wear gloves when you do activities that could hurt the skin on your fingers or hand. Wear them when you garden, do yard work, wash dishes, and clean with chemicals. Use oven mitts when you handle hot food. Do not have blood drawn from the arm on the side of the lymph node surgery. Do not get injections (shots) or have an IV put in the affected arm. Do not allow a blood pressure cuff to be placed on that arm. If you are in the hospital, make sure you tell your nurse and other hospital staff about your condition. Do not expose your arm to very hot or very cold temperatures. For example, don't use hot tubs, saunas, or steam rooms. Don't use a heating pad or cold pack on that arm or shoulder. Rest your arm often when you do repeated movements, such as vacuum, scrub, or mop. Try to use your other arm to carry heavy things, such as grocery bags. If you carry a briefcase or a purse, avoid shoulder straps and carry it on your good arm. Ask your doctor about wearing a compression sleeve and glove (gauntlet). Your doctor may want you to wear these when you exercise or when you fly in an airplane. They can help keep fluid from pooling in your arm and hand. Exercise    Ask your doctor about exercises for your arm and hand. Your doctor may recommend that you see a physical therapist. This person can teach you how to do self-massage to move fluid out of your arm. Check with your doctor before you start exercises that use the arm. This includes tennis, rowing, or weight lifting. Your doctor can help you find an activity level that's right for you. When should you call for help?    Call your doctor now or seek immediate medical care if:    You have signs of infection, such as: Increased pain, swelling, warmth, or redness. Red streaks leading from the area. Pus draining from the area. A fever. Watch closely for changes in your health, and be sure to contact your doctor if:    You have new or worse symptoms from lymphedema. You do not get better as expected. Where can you learn more? Go to https://CHF [email or web address].[email or web address].md. org and sign in to your Ymagis account. Enter G546 in the Desi Hits box to learn more about \"Preventing Lymphedema After Treatment for Breast Cancer: Care Instructions. \"     If you do not have an account, please click on the \"Sign Up Now\" link. Current as of: September 8, 2021               Content Version: 13.3  © 9621-8035 Healthwise, [employer]orporated. Care instructions adapted under license by [facility] ([facility]). If you have questions about a medical condition or this instruction, always ask your healthcare professional. [place] [address] any warranty or liability for your use of this information.

## 2022-09-12 NOTE — PROGRESS NOTES
PATIENT:    Gunjan Lebron     DATE:      9/12/2022     TIME: 12:18 PM     Subjective:     Gunjan Lebron presents for follow-up of breast cancer. She is not on hormonal therapy due to side effects. The breast cancer is Cancer Staging  Breast neoplasm, Tis (DCIS), left  Staging form: Breast, AJCC 8th Edition  - Clinical stage from 3/5/2018: Stage 0 (cTis (DCIS), cN0, cM0, ER+) - Signed by Joseph Hernandez MD on 12/2/2020    Carcinoma of upper-outer quadrant of left breast in female, estrogen receptor positive ([facility] [address]  Staging form: Breast, AJCC 8th Edition  - Clinical stage from 12/14/2020: Stage IA (rcT1, cN0, cM0, G2, ER+, CO+, HER2-) - Signed by Joseph Hernandez MD on 1/4/2021       She does perform self breast exams. She denies breast pain, masses, skin changes, nipple discharge, nipple retraction, or recent breast trauma bilaterally. Patient's medications, allergies, past medical, surgical, social and family histories were reviewed and updated as appropriate. Current Outpatient Medications on File Prior to Visit   Medication Sig Dispense Refill    topiramate (TOPAMAX) 25 MG tablet Take 25 mg by mouth nightly      ondansetron (ZOFRAN) 8 MG tablet Take 8 mg by mouth every 8 hours as needed      Insulin Degludec (TRESIBA FLEXTOUCH) 200 UNIT/ML SOPN Inject 100 Units into the skin every morning      magnesium oxide (MAG-OX) 400 MG tablet Take 400 mg by mouth daily      HUMALOG KWIKPEN 100 UNIT/ML SOPN Inject 20 Units into the skin 3 times daily (with meals)      SUMAtriptan (IMITREX) 50 MG tablet Take 1 tablet by mouth as needed      JANUVIA 100 MG tablet Take 1 tablet by mouth daily      famotidine (PEPCID) 40 MG tablet Take 1 tablet by mouth nightly (Patient not taking: Reported on 9/12/2022)      ascorbic acid (VITAMIN C) 500 MG tablet Take 1 tablet by mouth daily      ALPRAZolam (XANAX XR) 1 MG extended release tablet Take 1 tablet by mouth daily as needed.       amoxicillin (AMOXIL) 500 MG capsule Take 4 capsules by mouth daily as needed Prior to dental work      clotrimazole-betamethasone (LOTRISONE) 1-0.05 % cream Apply topically 2 times daily. 1 each 2    mycophenolate (CELLCEPT) 250 MG capsule Take 1 capsule by mouth 3 times daily      DULoxetine (CYMBALTA) 20 MG extended release capsule Take 1 capsule by mouth daily      Blood Glucose Monitoring Suppl (TRUE METRIX AIR GLUCOSE METER) w/Device KIT USE AS DIRECTED FOR CHECKING BLOOD SUGAR 4 TIMES DAILY. TRUE METRIX BLOOD GLUCOSE TEST strip USE AS DIRECTED FOR CHECKING BLOOD SUGAR 4 TIMES DAILY. TRUEplus Lancets 28G MISC USE AS DIRECTED FOR CHECKING BLOOD SUGAR 4 TIMES DAILY. Multiple Vitamins-Minerals (OCUVITE ADULT FORMULA PO) Take by mouth      diclofenac sodium (VOLTAREN) 1 % GEL Apply 2 g topically 4 times daily      augmented betamethasone dipropionate (DIPROLENE-AF) 0.05 % cream Apply 1 Dose topically 2 times daily      tacrolimus (PROGRAF) 0.5 MG capsule Take 0.5 mg by mouth 2 times daily      omeprazole (PRILOSEC) 20 MG delayed release capsule Take 20 mg by mouth daily      doxycycline hyclate (VIBRA-TABS) 100 MG tablet Take 100 mg by mouth daily as needed       clindamycin-benzoyl peroxide (BENZACLIN) 1-5 % gel Apply topically nightly Apply topically 2 times daily. pravastatin (PRAVACHOL) 10 MG tablet Take 10 mg by mouth nightly      escitalopram (LEXAPRO) 20 MG tablet Take 20 mg by mouth daily (Patient not taking: Reported on 9/12/2022)      diphenhydrAMINE (BENADRYL) 25 MG capsule Take 25 mg by mouth nightly as needed for Itching (2-4 tabs at bedtime prn)      nystatin (MYCOSTATIN) 833042 UNIT/GM ointment Apply topically 2 times daily Apply topically 2 times daily. vitamin D (CHOLECALCIFEROL) 1000 UNIT TABS tablet Take 1,000 Units by mouth 2 times daily      tretinoin microspheres (RETIN-A MICRO) 0.1 % gel Apply topically nightly Apply topically nightly.       Simethicone (GAS-X EXTRA STRENGTH) 125 MG CAPS Take 1 capsule by mouth 3 times daily       No current facility-administered medications on file prior to visit. Any over the counter meds / herbal supplements / vitamins: yes        Objective:     Vitals:    09/12/22 1141   BP: 132/70   Site: Right Upper Arm   Pulse: 100   Resp: 16   Temp: 97.2 °F (36.2 °C)   SpO2: 99%   Weight: 206 lb 3.2 oz (93.5 kg)   Height: 5' 10\" (1.778 m)        Lymphedema Screening 9/12/2022 3/7/2022 8/30/2021   RIGHT 10 cm (Hand) - - 19.2   RIGHT 20 cm (Wrist) - - 19   RIGHT 30 cm (Forearm) - - 24.5   RIGHT 60 cm (Upper Arm) - - 34   LEFT 10 cm (Hand) 19 19.5 -   LEFT 20 cm (Wrist) 18.5 18.5 -   LEFT 30 cm (Forearm) 22 21.5 -   LEFT 60 cm (Upper Arm) 32 33.5 -       Physical Exam  Vitals reviewed. Constitutional:       Appearance: Normal appearance. She is well-developed. HENT:      Head: Normocephalic and atraumatic. Nose: Nose normal.   Eyes:      Conjunctiva/sclera: Conjunctivae normal.      Right eye: No hemorrhage. Left eye: No hemorrhage. Cardiovascular:      Rate and Rhythm: Normal rate. Pulmonary:      Effort: Pulmonary effort is normal. No respiratory distress. Chest:   Breasts:     Breasts are asymmetrical (s/p left lumpectomy). Right: No inverted nipple, mass, nipple discharge, skin change, tenderness, axillary adenopathy or supraclavicular adenopathy. Left: No inverted nipple, mass, nipple discharge, skin change, tenderness, axillary adenopathy or supraclavicular adenopathy. Musculoskeletal:         General: Normal range of motion. Cervical back: Normal range of motion and neck supple. Comments: Normal Range of motion in upper and lower extremities. Lymphadenopathy:      Cervical: No cervical adenopathy. Right cervical: No superficial, deep or posterior cervical adenopathy. Left cervical: No superficial, deep or posterior cervical adenopathy. Upper Body:      Right upper body: No supraclavicular, axillary or pectoral adenopathy.       Left upper body: No supraclavicular, axillary or pectoral adenopathy. Skin:     General: Skin is warm and dry. Findings: No abrasion, bruising, erythema or lesion. Neurological:      Mental Status: She is alert and oriented to person, place, and time. She is not disoriented. Psychiatric:         Speech: Speech normal.         Behavior: Behavior normal. Behavior is cooperative. Thought Content: Thought content normal.         Judgment: Judgment normal.           IMAGING:     In epic     Assessment:       ICD-10-CM    1. Carcinoma of upper-outer quadrant of left breast in female, estrogen receptor positive ([facility] [address]  [address] Amparo Gotti NP, Oncology Support Services    Z17.0       2. Encounter for screening mammogram for breast cancer  Z12.31 BEE PO DIGITAL SCREEN BILATERAL      3. Overweight (BMI 25.0-29. 9)  E66.3             Plan:     Patient is doing well. Recommend clinical breast exams with Maddy Monteiro in the high risk breast clinic in 6 month(s)  Mammography in 6 month(s)  Recommend an active lifestyle, healthy diet, limited alcohol intake, achieve and maintain a healthy BMI to optimize breast cancer outcomes / decrease risk of breast cancer. Referral to or Follow up with Medical Oncology  Referral to or Follow up with Radiation Oncology  Follow up with PCP regarding all medical problems          I spent a total of 30 minutes on the date of the service which included preparing to see the patient, face-to-face patient care, completing clinical documentation, obtaining and/or reviewing separately obtained history, performing a medically appropriate examination, counseling and educating the patient/family/caregiver, ordering medications, tests, or procedures, communicating with other HCPs (not separately reported), independently interpreting results (not separately reported), communicating results to the patient/family/caregiver and care coordination (not separately reported).  Discussion regarding natural history and potential progression of breast changes, timing of surveillance visits, timing and type of surveillance imaging, life-style modification, exercise, and limiting alcohol intake were performed today. I personally and independently saw and examined patient and reviewed all pertinent laboratory data and imaging studies. I have reviewed and agree with the history and review of systems as documented in the above note. This document is generated, in part, by voice recognition software and thus syntax and grammatical errors are possible. Dictated by Srinivasa Garcia MD 9/12/2022 12:18 PM       This note was partially generated using Dragon voice recognition system, and there may be some incorrect words, spellings, punctuation that were not noticed in checking the note before saving.

## 2023-03-13 ENCOUNTER — HOSPITAL ENCOUNTER (OUTPATIENT)
Dept: WOMENS IMAGING | Age: 68
Discharge: HOME | End: 2023-03-15
Payer: MEDICARE

## 2023-03-13 DIAGNOSIS — Z12.31 ENCOUNTER FOR SCREENING MAMMOGRAM FOR BREAST CANCER: ICD-10-CM

## 2023-03-13 PROCEDURE — 77063 BREAST TOMOSYNTHESIS BI: CPT

## 2023-03-20 ENCOUNTER — OFFICE VISIT (OUTPATIENT)
Dept: RADIATION ONCOLOGY | Age: 68
End: 2023-03-20
Payer: MEDICARE

## 2023-03-20 ENCOUNTER — PATIENT MESSAGE (OUTPATIENT)
Dept: RADIATION ONCOLOGY | Age: 68
End: 2023-03-20

## 2023-03-20 VITALS
HEART RATE: 96 BPM | RESPIRATION RATE: 16 BRPM | TEMPERATURE: 96.9 F | SYSTOLIC BLOOD PRESSURE: 133 MMHG | DIASTOLIC BLOOD PRESSURE: 84 MMHG | WEIGHT: 219 LBS | BODY MASS INDEX: 31.42 KG/M2

## 2023-03-20 DIAGNOSIS — Z12.31 BREAST CANCER SCREENING BY MAMMOGRAM: Primary | ICD-10-CM

## 2023-03-20 DIAGNOSIS — D05.12 BREAST NEOPLASM, TIS (DCIS), LEFT: Primary | ICD-10-CM

## 2023-03-20 PROCEDURE — 1090F PRES/ABSN URINE INCON ASSESS: CPT

## 2023-03-20 PROCEDURE — G8417 CALC BMI ABV UP PARAM F/U: HCPCS

## 2023-03-20 PROCEDURE — G8427 DOCREV CUR MEDS BY ELIG CLIN: HCPCS

## 2023-03-20 PROCEDURE — 99214 OFFICE O/P EST MOD 30 MIN: CPT

## 2023-03-20 PROCEDURE — G8400 PT W/DXA NO RESULTS DOC: HCPCS

## 2023-03-20 PROCEDURE — 3017F COLORECTAL CA SCREEN DOC REV: CPT

## 2023-03-20 PROCEDURE — 1123F ACP DISCUSS/DSCN MKR DOCD: CPT

## 2023-03-20 PROCEDURE — G8484 FLU IMMUNIZE NO ADMIN: HCPCS

## 2023-03-20 PROCEDURE — 1036F TOBACCO NON-USER: CPT

## 2023-03-20 PROCEDURE — 3079F DIAST BP 80-89 MM HG: CPT

## 2023-03-20 PROCEDURE — 3075F SYST BP GE 130 - 139MM HG: CPT

## 2023-03-20 ASSESSMENT — ENCOUNTER SYMPTOMS
CONSTIPATION: 0
SHORTNESS OF BREATH: 1
DIARRHEA: 0
COUGH: 0
SINUS PAIN: 0
BLOOD IN STOOL: 0
CHEST TIGHTNESS: 0
ALLERGIC/IMMUNOLOGIC COMMENTS: DAIRY
SINUS PRESSURE: 0

## 2023-03-20 NOTE — TELEPHONE ENCOUNTER
From: Stoney Connelly  To: Berto Lugo  Sent: 3/20/2023 1:22 PM EDT  Subject: Arm circumference     I would like to know the measurements you obtained on my left arm today and how they compares to previous measurements.  Ty.

## 2023-03-20 NOTE — PROGRESS NOTES
XR) 1 MG extended release tablet Take 1 tablet by mouth daily as needed.  amoxicillin (AMOXIL) 500 MG capsule Take 4 capsules by mouth daily as needed Prior to dental work      clotrimazole-betamethasone (LOTRISONE) 1-0.05 % cream Apply topically 2 times daily. 1 each 2    mycophenolate (CELLCEPT) 250 MG capsule Take 1 capsule by mouth 3 times daily      DULoxetine (CYMBALTA) 20 MG extended release capsule Take 1 capsule by mouth daily      Blood Glucose Monitoring Suppl (TRUE METRIX AIR GLUCOSE METER) w/Device KIT USE AS DIRECTED FOR CHECKING BLOOD SUGAR 4 TIMES DAILY.  TRUE METRIX BLOOD GLUCOSE TEST strip USE AS DIRECTED FOR CHECKING BLOOD SUGAR 4 TIMES DAILY.  TRUEplus Lancets 28G MISC USE AS DIRECTED FOR CHECKING BLOOD SUGAR 4 TIMES DAILY.  Multiple Vitamins-Minerals (OCUVITE ADULT FORMULA PO) Take by mouth      diclofenac sodium (VOLTAREN) 1 % GEL Apply 2 g topically 4 times daily      augmented betamethasone dipropionate (DIPROLENE-AF) 0.05 % cream Apply 1 Dose topically 2 times daily      tacrolimus (PROGRAF) 0.5 MG capsule Take 0.5 mg by mouth 2 times daily      omeprazole (PRILOSEC) 20 MG delayed release capsule Take 20 mg by mouth daily      doxycycline hyclate (VIBRA-TABS) 100 MG tablet Take 100 mg by mouth daily as needed       clindamycin-benzoyl peroxide (BENZACLIN) 1-5 % gel Apply topically nightly Apply topically 2 times daily.  pravastatin (PRAVACHOL) 10 MG tablet Take 10 mg by mouth nightly      escitalopram (LEXAPRO) 20 MG tablet Take 20 mg by mouth daily      diphenhydrAMINE (BENADRYL) 25 MG capsule Take 25 mg by mouth nightly as needed for Itching (2-4 tabs at bedtime prn)      nystatin (MYCOSTATIN) 222170 UNIT/GM ointment Apply topically 2 times daily Apply topically 2 times daily.       vitamin D (CHOLECALCIFEROL) 1000 UNIT TABS tablet Take 1,000 Units by mouth 2 times daily      tretinoin microspheres (RETIN-A MICRO) 0.1 % gel Apply topically nightly

## 2023-03-20 NOTE — TELEPHONE ENCOUNTER
I called Aminta Dinero back to inform her that there is some enlargement of her left forearm and upper arm according to recent measurement today. I advised I will place an order for PT/OT to evaluate for possible treatment. She agrees to this.

## 2023-04-06 ENCOUNTER — HOSPITAL ENCOUNTER (OUTPATIENT)
Dept: PHYSICAL THERAPY | Age: 68
Setting detail: THERAPIES SERIES
Discharge: HOME | End: 2023-04-06
Payer: MEDICARE

## 2023-04-06 PROCEDURE — 97162 PT EVAL MOD COMPLEX 30 MIN: CPT

## 2023-04-06 ASSESSMENT — PAIN SCALES - GENERAL: PAINLEVEL_OUTOF10: 0

## 2023-04-06 NOTE — PROGRESS NOTES
Forgets limitations  [x] Oriented to own ability 15  0 0      Total: 0      Based on the Assessment score: check the appropriate box.   [x]  No intervention needed   Low =   Score of 0-24  []  Use standard prevention interventions Moderate =  Score of 24-44   [] Discuss fall prevention strategies   [] Indicate moderate falls risk on eval  []  Use high risk prevention interventions High = Score of 45 and higher   [] Discuss fall prevention strategies   [] Provide supervision during treatment time

## 2023-04-06 NOTE — PLAN OF CARE
Frequency: 1  Plan weeks: 1-2  Current Treatment Recommendations: Manual lymphatic drainage, Strengthening     Precautions:       lymphedema                      Patient Status:[x] Continue/ Initiate plan of Care    [] Discharge PT. Recommend pt continue with HEP. [] Additional visits requested, Please re-certify for additional visits:    [] Hold         Signature: Electronically signed by Cheli Coleman PT on 4/6/23 at 3:18 PM EDT      If you have any questions or concerns, please don't hesitate to call.   Thank you for your referral.

## 2023-04-19 ENCOUNTER — APPOINTMENT (OUTPATIENT)
Dept: PHYSICAL THERAPY | Age: 68
End: 2023-04-19
Payer: MEDICARE

## 2023-04-24 ENCOUNTER — HOSPITAL ENCOUNTER (OUTPATIENT)
Dept: PHYSICAL THERAPY | Age: 68
Setting detail: THERAPIES SERIES
Discharge: HOME | End: 2023-04-24
Payer: MEDICARE

## 2023-04-24 NOTE — PROGRESS NOTES
[facility]    [] [address]  [x] [facility] and Therapy            Physical Therapy  Cancellation/No-show Note  Patient Name:  Petros Porter  :  1955   Date:  2023          Visit Information:  PT Visit Information  Onset Date: 21  PT Insurance Information: medicare  Total # of Visits Approved:  (BMN)  Total # of Visits to Date: 1  Plan of Care/Certification Expiration Date: 23  No Show: 0  Progress Note Due Date: 23  Canceled Appointment: 1  Progress Note Counter: 1/10 cxl     For today's appointment patient:  [x]  Cancelled  []  Rescheduled appointment  []  No-show   []  Called pt to remind of next appointment     Reason given by patient:  []  Patient ill  []  Conflicting appointment  []  No transportation    []  Conflict with work  [x]  No reason given  []  Inclement weather   []  Other:       Comments:       Signature: Electronically signed by Hunter Pal PT on 23 at 11:22 AM EDT

## 2023-07-06 ENCOUNTER — CLINICAL DOCUMENTATION (OUTPATIENT)
Dept: PHYSICAL THERAPY | Age: 68
End: 2023-07-06

## 2023-07-06 NOTE — DISCHARGE SUMMARY
[address]    []  [facility] and Therapy    [x]  [facility] and [address]    [address]  [address]  Ph: [phone]     Ph: [phone]  Fax: [phone]     Fax: [phone]    [] Certification  [] Recertification []  Plan of Care  [] Progress Note [x] Discharge    Date: 2023  Patient Name: Alicja Tamez  : 1955  MRN: [id number]    To:  Beatriz Andrews CNP        From: Fitz Rosenberg PT     [x]   Patient has not been seen in PT since initial evaluation on 23 and has not responded to   attempts to contact. Patient will be discharged. Comments: Pt was seen for evaluation only. Recommended 1-2 visits for education. No further response from pt. Will discharge due to > 30 day lapse in treatment. Pt was issued HEP and education at time of evaluation. Please use initial POC for last measured objective status. Thank you for your referral and the opportunity to treat this patient. Please contact us with any questions or concerns.     Electronically signed by Fitz Rosenberg PT on 23 at 2:43 PM EDT

## 2023-07-25 ENCOUNTER — HOSPITAL ENCOUNTER (EMERGENCY)
Age: 68
Discharge: HOME | End: 2023-07-25
Attending: STUDENT IN AN ORGANIZED HEALTH CARE EDUCATION/TRAINING PROGRAM
Payer: MEDICARE

## 2023-07-25 VITALS
OXYGEN SATURATION: 95 % | HEART RATE: 91 BPM | WEIGHT: 220 LBS | RESPIRATION RATE: 18 BRPM | HEIGHT: 70 IN | DIASTOLIC BLOOD PRESSURE: 79 MMHG | BODY MASS INDEX: 31.5 KG/M2 | TEMPERATURE: 98.3 F | SYSTOLIC BLOOD PRESSURE: 124 MMHG

## 2023-07-25 DIAGNOSIS — R42 LIGHTHEADEDNESS: Primary | ICD-10-CM

## 2023-07-25 DIAGNOSIS — E86.0 DEHYDRATION: ICD-10-CM

## 2023-07-25 DIAGNOSIS — I95.1 ORTHOSTASIS: ICD-10-CM

## 2023-07-25 LAB
ALBUMIN SERPL-MCNC: 4.5 G/DL (ref 3.5–4.6)
ALP SERPL-CCNC: 121 U/L (ref 40–130)
ALT SERPL-CCNC: 22 U/L (ref 0–33)
ANION GAP SERPL CALCULATED.3IONS-SCNC: 14 MEQ/L (ref 9–15)
APTT PPP: 28.6 SEC (ref 24.4–36.8)
AST SERPL-CCNC: 23 U/L (ref 0–35)
BASOPHILS # BLD: 0.1 K/UL (ref 0–0.2)
BASOPHILS NFR BLD: 0.5 %
BILIRUB DIRECT SERPL-MCNC: <0.2 MG/DL (ref 0–0.4)
BILIRUB INDIRECT SERPL-MCNC: NORMAL MG/DL (ref 0–0.6)
BILIRUB SERPL-MCNC: 0.4 MG/DL (ref 0.2–0.7)
BILIRUB UR QL STRIP: NEGATIVE
BNP BLD-MCNC: 108 PG/ML
BUN SERPL-MCNC: 26 MG/DL (ref 8–23)
CALCIUM SERPL-MCNC: 10.2 MG/DL (ref 8.5–9.9)
CHLORIDE SERPL-SCNC: 104 MEQ/L (ref 95–107)
CLARITY UR: CLEAR
CO2 SERPL-SCNC: 22 MEQ/L (ref 20–31)
COLOR UR: YELLOW
CREAT SERPL-MCNC: 1.23 MG/DL (ref 0.5–0.9)
EOSINOPHIL # BLD: 0.1 K/UL (ref 0–0.7)
EOSINOPHIL NFR BLD: 1 %
ERYTHROCYTE [DISTWIDTH] IN BLOOD BY AUTOMATED COUNT: 13.4 % (ref 11.5–14.5)
GLUCOSE SERPL-MCNC: 166 MG/DL (ref 70–99)
GLUCOSE UR STRIP-MCNC: >=1000 MG/DL
HCT VFR BLD AUTO: 46.7 % (ref 37–47)
HGB BLD-MCNC: 15.4 G/DL (ref 12–16)
HGB UR QL STRIP: NEGATIVE
INR PPP: 1.1
KETONES UR STRIP-MCNC: NEGATIVE MG/DL
LACTATE BLDV-SCNC: 1.9 MMOL/L (ref 0.5–2.2)
LEUKOCYTE ESTERASE UR QL STRIP: NEGATIVE
LYMPHOCYTES # BLD: 1.8 K/UL (ref 1–4.8)
LYMPHOCYTES NFR BLD: 17.2 %
MCH RBC QN AUTO: 30.7 PG (ref 27–31.3)
MCHC RBC AUTO-ENTMCNC: 32.9 % (ref 33–37)
MCV RBC AUTO: 93.2 FL (ref 79.4–94.8)
MONOCYTES # BLD: 0.6 K/UL (ref 0.2–0.8)
MONOCYTES NFR BLD: 5.8 %
NEUTROPHILS # BLD: 7.8 K/UL (ref 1.4–6.5)
NEUTS SEG NFR BLD: 75.5 %
NITRITE UR QL STRIP: NEGATIVE
PH UR STRIP: 5.5 [PH] (ref 5–9)
PLATELET # BLD AUTO: 267 K/UL (ref 130–400)
POTASSIUM SERPL-SCNC: 4.8 MEQ/L (ref 3.4–4.9)
PROT SERPL-MCNC: 7.4 G/DL (ref 6.3–8)
PROT UR STRIP-MCNC: NEGATIVE MG/DL
PROTHROMBIN TIME: 13.9 SEC (ref 12.3–14.9)
RBC # BLD AUTO: 5.01 M/UL (ref 4.2–5.4)
SODIUM SERPL-SCNC: 140 MEQ/L (ref 135–144)
SP GR UR STRIP: 1.02 (ref 1–1.03)
TROPONIN T SERPL-MCNC: <0.01 NG/ML (ref 0–0.01)
UROBILINOGEN UR STRIP-ACNC: 0.2 E.U./DL
WBC # BLD AUTO: 10.4 K/UL (ref 4.8–10.8)

## 2023-07-25 PROCEDURE — 85730 THROMBOPLASTIN TIME PARTIAL: CPT

## 2023-07-25 PROCEDURE — 96374 THER/PROPH/DIAG INJ IV PUSH: CPT

## 2023-07-25 PROCEDURE — 80048 BASIC METABOLIC PNL TOTAL CA: CPT

## 2023-07-25 PROCEDURE — 93005 ELECTROCARDIOGRAM TRACING: CPT | Performed by: STUDENT IN AN ORGANIZED HEALTH CARE EDUCATION/TRAINING PROGRAM

## 2023-07-25 PROCEDURE — 80076 HEPATIC FUNCTION PANEL: CPT

## 2023-07-25 PROCEDURE — 99284 EMERGENCY DEPT VISIT MOD MDM: CPT

## 2023-07-25 PROCEDURE — 85025 COMPLETE CBC W/AUTO DIFF WBC: CPT

## 2023-07-25 PROCEDURE — 83605 ASSAY OF LACTIC ACID: CPT

## 2023-07-25 PROCEDURE — 2580000003 HC RX 258: Performed by: STUDENT IN AN ORGANIZED HEALTH CARE EDUCATION/TRAINING PROGRAM

## 2023-07-25 PROCEDURE — 96361 HYDRATE IV INFUSION ADD-ON: CPT

## 2023-07-25 PROCEDURE — 83880 ASSAY OF NATRIURETIC PEPTIDE: CPT

## 2023-07-25 PROCEDURE — 96375 TX/PRO/DX INJ NEW DRUG ADDON: CPT

## 2023-07-25 PROCEDURE — 84484 ASSAY OF TROPONIN QUANT: CPT

## 2023-07-25 PROCEDURE — 85610 PROTHROMBIN TIME: CPT

## 2023-07-25 PROCEDURE — 6360000002 HC RX W HCPCS: Performed by: STUDENT IN AN ORGANIZED HEALTH CARE EDUCATION/TRAINING PROGRAM

## 2023-07-25 PROCEDURE — 81003 URINALYSIS AUTO W/O SCOPE: CPT

## 2023-07-25 PROCEDURE — 36415 COLL VENOUS BLD VENIPUNCTURE: CPT

## 2023-07-25 RX ORDER — PROCHLORPERAZINE EDISYLATE 5 MG/ML
10 INJECTION INTRAMUSCULAR; INTRAVENOUS EVERY 6 HOURS PRN
Status: DISCONTINUED | OUTPATIENT
Start: 2023-07-25 | End: 2023-07-25 | Stop reason: HOSPADM

## 2023-07-25 RX ORDER — DIPHENHYDRAMINE HYDROCHLORIDE 50 MG/ML
25 INJECTION INTRAMUSCULAR; INTRAVENOUS ONCE
Status: COMPLETED | OUTPATIENT
Start: 2023-07-25 | End: 2023-07-25

## 2023-07-25 RX ORDER — SODIUM CHLORIDE, SODIUM LACTATE, POTASSIUM CHLORIDE, AND CALCIUM CHLORIDE .6; .31; .03; .02 G/100ML; G/100ML; G/100ML; G/100ML
500 INJECTION, SOLUTION INTRAVENOUS ONCE
Status: COMPLETED | OUTPATIENT
Start: 2023-07-25 | End: 2023-07-25

## 2023-07-25 RX ORDER — ONDANSETRON 2 MG/ML
4 INJECTION INTRAMUSCULAR; INTRAVENOUS ONCE
Status: COMPLETED | OUTPATIENT
Start: 2023-07-25 | End: 2023-07-25

## 2023-07-25 RX ADMIN — PROCHLORPERAZINE EDISYLATE 10 MG: 5 INJECTION, SOLUTION INTRAMUSCULAR; INTRAVENOUS at 11:58

## 2023-07-25 RX ADMIN — SODIUM CHLORIDE, POTASSIUM CHLORIDE, SODIUM LACTATE AND CALCIUM CHLORIDE 500 ML: 600; 310; 30; 20 INJECTION, SOLUTION INTRAVENOUS at 16:43

## 2023-07-25 RX ADMIN — ONDANSETRON 4 MG: 2 INJECTION INTRAMUSCULAR; INTRAVENOUS at 11:56

## 2023-07-25 RX ADMIN — SODIUM CHLORIDE, POTASSIUM CHLORIDE, SODIUM LACTATE AND CALCIUM CHLORIDE 500 ML: 600; 310; 30; 20 INJECTION, SOLUTION INTRAVENOUS at 11:54

## 2023-07-25 RX ADMIN — DIPHENHYDRAMINE HYDROCHLORIDE 25 MG: 50 INJECTION, SOLUTION INTRAMUSCULAR; INTRAVENOUS at 11:59

## 2023-07-25 ASSESSMENT — PAIN DESCRIPTION - FREQUENCY: FREQUENCY: CONTINUOUS

## 2023-07-25 ASSESSMENT — ENCOUNTER SYMPTOMS
NAUSEA: 1
SHORTNESS OF BREATH: 1

## 2023-07-25 ASSESSMENT — PAIN DESCRIPTION - LOCATION: LOCATION: HEAD

## 2023-07-25 ASSESSMENT — PAIN DESCRIPTION - ONSET: ONSET: ON-GOING

## 2023-07-25 ASSESSMENT — PAIN - FUNCTIONAL ASSESSMENT
PAIN_FUNCTIONAL_ASSESSMENT: 0-10
PAIN_FUNCTIONAL_ASSESSMENT: NONE - DENIES PAIN

## 2023-07-25 ASSESSMENT — PAIN SCALES - GENERAL
PAINLEVEL_OUTOF10: 5
PAINLEVEL_OUTOF10: 0

## 2023-07-25 ASSESSMENT — PAIN DESCRIPTION - DESCRIPTORS: DESCRIPTORS: ACHING

## 2023-07-25 NOTE — ED NOTES
Discharge  instructions given and reviewed. Patient verbalized understanding. Patient ambulated out of ED with a steady gait to POV.       Jf Dumont RN  07/25/23 7296

## 2023-07-25 NOTE — ED TRIAGE NOTES
The patient came to the ED for HA \"for a month\" and unsteady gait \"for one day\". Pt states she has dizziness with positional changes. Denies any recent falls. Respirations even and unlabored. A&Ox4.

## 2023-07-25 NOTE — ED NOTES
IV obtained and blood work sent to lab. Patient tolerated well.      Rocio Shrestha RN  07/25/23 6221

## 2023-07-25 NOTE — DISCHARGE INSTRUCTIONS
You were seen and evaluated at [facility] for lightheadedness and unsteadiness on your feet. You were found to be dehydrated on laboratory studies. Your heart rate decreased after treatment with IV fluids here in the emergency department and your symptoms also improved. You are also treated for a headache. I suspect that your headache is from tension given stressors in the home. Please continue to take home medications as prescribed. Continue to follow-up closely with your cardiology team and let them know that you were seen and evaluated in this emergency department today.

## 2023-07-25 NOTE — ED NOTES
[facility] ED  EMERGENCY DEPARTMENT ENCOUNTER      Pt Name: Ted Chan  MRN: [id number]  [facility] 1955  Date of evaluation: 7/25/2023  Provider: William Cantu MD    [address]       Chief Complaint   Patient presents with    Dizziness     With HA         HISTORY OF PRESENT ILLNESS   (Location/Symptom, Timing/Onset, Context/Setting, Quality, Duration, Modifying Factors, Severity)  Note limiting factors. Ted Chan is a 79 y.o. female who presents to the emergency department for evaluation of lightheadedness. HPI  This is a 49-year-old female with past medical history notable for heart transplant 2004 at [facility] clinic on maintenance tacrolimus and mycophenolate, DCIS breast cancer left breast & carcinoma ER positive left breast in remission, depression, HTN, HLD, renal stones, T2DM, KARMEN and osteoarthritis who presents to the emergency department for evaluation of waxing and waning frontal headache which has been ongoing for the past month. Reports that these headaches have been experienced intermittently over several years, but became more common over the past month. Reports that she feels they may be triggered by stress as she is currently taking care of her  who has dementia and alcohol use disorder. Reports that pain is pressure-like in character, comes and goes, and recently has not been alleviated with home use of Tylenol and Imitrex. Associated with phonophobia, no photophobia, no vision changes, no facial droop, no focal weakness, no focal numbness, no spinning sensation, no fevers, no chills, no chest pain, no palpitations, no abdominal pain, no vomiting, no diarrhea, no leg swelling, no unexpected weight gain. She has been adherent to her home tacrolimus and mycophenolate. Reports that yesterday evening she became more concerned as she felt unsteady with getting up and walking around. She did not fall to one side.   No associated

## 2023-07-25 NOTE — ED NOTES
Patient resting in bed with no s/s of distress. Respirations even and unlabored. No needs expressed at this time.      Lavinia Camacho RN  07/25/23 6778

## 2023-07-25 NOTE — ED NOTES
Pt up to restroom at this time. Pt has a unsteady gait as observed by this RN.       Maeve Kemp RN  07/25/23 8416

## 2023-07-25 NOTE — ED NOTES
Pt ambulated with PCA and denied being dizzy. PCA states pt did ok walking was a little unsteady on her feet. Pt denies using a cane or walker.       Kannan Betts RN  07/25/23 6885

## 2023-07-26 NOTE — ED PROVIDER NOTES
Interpretation per the Radiologist below, if available at the time of this note:     No orders to display            ED BEDSIDE ULTRASOUND:   Performed by ED Physician - none     LABS:        Labs Reviewed   CBC WITH AUTO DIFFERENTIAL - Abnormal; Notable for the following components:       Result Value      MCHC 32.9 (*)       Neutrophils Absolute 7.8 (*)       All other components within normal limits   URINALYSIS - Abnormal; Notable for the following components:     Glucose, Ur >=1000 (*)       All other components within normal limits   BASIC METABOLIC PANEL - Abnormal; Notable for the following components:     Glucose 166 (*)       BUN 26 (*)       Creatinine 1.23 (*)       Est, Glom Filt Rate 47.9 (*)       Calcium 10.2 (*)       All other components within normal limits   LACTIC ACID   HEPATIC FUNCTION PANEL   TROPONIN   BRAIN NATRIURETIC PEPTIDE   APTT   PROTIME-INR         All other labs were within normal range or not returned as of this dictation. EMERGENCY DEPARTMENT COURSE and DIFFERENTIAL DIAGNOSIS/MDM:   Vitals:    Vitals          Vitals:     07/25/23 1415 07/25/23 1500 07/25/23 1515 07/25/23 1644   BP: 111/70 130/66   124/79   Pulse: 92 91   91   Resp: 17 18       Temp:           TempSrc:           SpO2: 99% 95% 95%     Weight:           Height:                    MDM  This is a 61-year-old female with past medical history notable for breast cancer in remission, heart transplant 2004 [facility], who is presenting with increased frequency of headache associated with intermittent unsteadiness since yesterday evening. No associated spinning sensation. Reports that lightheadedness was precipitated by changes in position.      Differential diagnosis:  Lightheadedness, dehydration, electrolyte disturbance, arrhythmia, STEMI, NSTEMI, heart failure, heart failure exacerbation, urinary tract infection, benign positional vertigo        REASSESSMENT          ED Course as of 07/25/23 1845 Tushwetha Arellano

## 2023-07-27 LAB
EKG ATRIAL RATE: 91 BPM
EKG P AXIS: 20 DEGREES
EKG P-R INTERVAL: 146 MS
EKG Q-T INTERVAL: 408 MS
EKG QRS DURATION: 128 MS
EKG QTC CALCULATION (BAZETT): 501 MS
EKG R AXIS: 65 DEGREES
EKG T AXIS: 62 DEGREES
EKG VENTRICULAR RATE: 91 BPM

## 2023-07-27 PROCEDURE — 93010 ELECTROCARDIOGRAM REPORT: CPT | Performed by: INTERNAL MEDICINE

## 2023-08-03 ENCOUNTER — TELEMEDICINE (OUTPATIENT)
Dept: BEHAVIORAL/MENTAL HEALTH CLINIC | Age: 68
End: 2023-08-03
Payer: MEDICARE

## 2023-08-03 DIAGNOSIS — F33.1 MAJOR DEPRESSIVE DISORDER, RECURRENT EPISODE, MODERATE DEGREE (HCC): Primary | ICD-10-CM

## 2023-08-03 PROCEDURE — 90791 PSYCH DIAGNOSTIC EVALUATION: CPT | Performed by: PSYCHOLOGIST

## 2023-08-03 PROCEDURE — 1123F ACP DISCUSS/DSCN MKR DOCD: CPT | Performed by: PSYCHOLOGIST

## 2023-08-03 ASSESSMENT — ANXIETY QUESTIONNAIRES
5. BEING SO RESTLESS THAT IT IS HARD TO SIT STILL: NOT AT ALL
4. TROUBLE RELAXING: NEARLY EVERY DAY
1. FEELING NERVOUS, ANXIOUS, OR ON EDGE: 3
7. FEELING AFRAID AS IF SOMETHING AWFUL MIGHT HAPPEN: MORE THAN HALF THE DAYS
GAD7 TOTAL SCORE: 16
6. BECOMING EASILY ANNOYED OR IRRITABLE: 2
IF YOU CHECKED OFF ANY PROBLEMS ON THIS QUESTIONNAIRE, HOW DIFFICULT HAVE THESE PROBLEMS MADE IT FOR YOU TO DO YOUR WORK, TAKE CARE OF THINGS AT HOME, OR GET ALONG WITH OTHER PEOPLE: EXTREMELY DIFFICULT
4. TROUBLE RELAXING: 3
5. BEING SO RESTLESS THAT IT IS HARD TO SIT STILL: 0
IF YOU CHECKED OFF ANY PROBLEMS ON THIS QUESTIONNAIRE, HOW DIFFICULT HAVE THESE PROBLEMS MADE IT FOR YOU TO DO YOUR WORK, TAKE CARE OF THINGS AT HOME, OR GET ALONG WITH OTHER PEOPLE: EXTREMELY DIFFICULT
1. FEELING NERVOUS, ANXIOUS, OR ON EDGE: NEARLY EVERY DAY
7. FEELING AFRAID AS IF SOMETHING AWFUL MIGHT HAPPEN: 2
6. BECOMING EASILY ANNOYED OR IRRITABLE: MORE THAN HALF THE DAYS
3. WORRYING TOO MUCH ABOUT DIFFERENT THINGS: NEARLY EVERY DAY
3. WORRYING TOO MUCH ABOUT DIFFERENT THINGS: 3
2. NOT BEING ABLE TO STOP OR CONTROL WORRYING: NEARLY EVERY DAY
2. NOT BEING ABLE TO STOP OR CONTROL WORRYING: 3

## 2023-08-03 ASSESSMENT — PATIENT HEALTH QUESTIONNAIRE - PHQ9
SUM OF ALL RESPONSES TO PHQ QUESTIONS 1-9: 15
SUM OF ALL RESPONSES TO PHQ QUESTIONS 1-9: 16
5. POOR APPETITE OR OVEREATING: NEARLY EVERY DAY
4. FEELING TIRED OR HAVING LITTLE ENERGY: 2
4. FEELING TIRED OR HAVING LITTLE ENERGY: MORE THAN HALF THE DAYS
3. TROUBLE FALLING OR STAYING ASLEEP: 3
SUM OF ALL RESPONSES TO PHQ QUESTIONS 1-9: 16
3. TROUBLE FALLING OR STAYING ASLEEP: NEARLY EVERY DAY
10. IF YOU CHECKED OFF ANY PROBLEMS, HOW DIFFICULT HAVE THESE PROBLEMS MADE IT FOR YOU TO DO YOUR WORK, TAKE CARE OF THINGS AT HOME, OR GET ALONG WITH OTHER PEOPLE: 2
SUM OF ALL RESPONSES TO PHQ QUESTIONS 1-9: 16
1. LITTLE INTEREST OR PLEASURE IN DOING THINGS: 2
SUM OF ALL RESPONSES TO PHQ9 QUESTIONS 1 & 2: 4
8. MOVING OR SPEAKING SO SLOWLY THAT OTHER PEOPLE COULD HAVE NOTICED. OR THE OPPOSITE, BEING SO FIGETY OR RESTLESS THAT YOU HAVE BEEN MOVING AROUND A LOT MORE THAN USUAL: 0
9. THOUGHTS THAT YOU WOULD BE BETTER OFF DEAD, OR OF HURTING YOURSELF: SEVERAL DAYS
10. IF YOU CHECKED OFF ANY PROBLEMS, HOW DIFFICULT HAVE THESE PROBLEMS MADE IT FOR YOU TO DO YOUR WORK, TAKE CARE OF THINGS AT HOME, OR GET ALONG WITH OTHER PEOPLE: VERY DIFFICULT
7. TROUBLE CONCENTRATING ON THINGS, SUCH AS READING THE NEWSPAPER OR WATCHING TELEVISION: 0
6. FEELING BAD ABOUT YOURSELF - OR THAT YOU ARE A FAILURE OR HAVE LET YOURSELF OR YOUR FAMILY DOWN: 3
7. TROUBLE CONCENTRATING ON THINGS, SUCH AS READING THE NEWSPAPER OR WATCHING TELEVISION: NOT AT ALL
5. POOR APPETITE OR OVEREATING: 3
6. FEELING BAD ABOUT YOURSELF - OR THAT YOU ARE A FAILURE OR HAVE LET YOURSELF OR YOUR FAMILY DOWN: NEARLY EVERY DAY
9. THOUGHTS THAT YOU WOULD BE BETTER OFF DEAD, OR OF HURTING YOURSELF: 1
SUM OF ALL RESPONSES TO PHQ QUESTIONS 1-9: 16
2. FEELING DOWN, DEPRESSED OR HOPELESS: MORE THAN HALF THE DAYS
1. LITTLE INTEREST OR PLEASURE IN DOING THINGS: MORE THAN HALF THE DAYS
8. MOVING OR SPEAKING SO SLOWLY THAT OTHER PEOPLE COULD HAVE NOTICED. OR THE OPPOSITE - BEING SO FIDGETY OR RESTLESS THAT YOU HAVE BEEN MOVING AROUND A LOT MORE THAN USUAL: NOT AT ALL
2. FEELING DOWN, DEPRESSED OR HOPELESS: 2

## 2023-08-03 ASSESSMENT — COLUMBIA-SUICIDE SEVERITY RATING SCALE - C-SSRS
1. IN THE PAST MONTH, HAVE YOU WISHED YOU WERE DEAD OR WISHED YOU COULD GO TO SLEEP AND NOT WAKE UP?: YES
2. IN THE PAST MONTH, HAVE YOU ACTUALLY HAD ANY THOUGHTS OF KILLING YOURSELF?: NO
6. IN YOUR LIFETIME, HAVE YOU EVER DONE ANYTHING, STARTED TO DO ANYTHING, OR PREPARED TO DO ANYTHING TO END YOUR LIFE?: NO

## 2023-08-03 NOTE — PROGRESS NOTES
Behavioral Health Consultation  Key Bui, Ph.D., [facility]  Psychologist  8/3/23  2:04 PM EDT      Time spent with Patient: 30 minutes  This is patient's first  [facility] appointment in this episode of care. Pt had two appts in 2022    Reason for Consult:  depression, anxiety, and stress  Referring Provider: Alberta Rueda DO    Pt (and/or legal guardian) provided informed consent for the behavioral health program. Discussed with patient model of service to include the limits of confidentiality (i.e. abuse reporting, suicide intervention, etc.) and short-term intervention focused approach. Also discussed limits of evaluation/services as not forensic in nature, cannot be used for purposes of custody evaluations, disability determination, or to meet requirements of court-ordered treatment. Pt (and/or legal guardian) indicated understanding. Feedback given to PCP. TELEHEALTH EVALUATION -- Audio with Visual     Pt requested a virtual visit through a [employer] Video Visit. Patient reports that they are located at home. Provider Location is at her office in [place]. Patient (and/or legal guardian) also understood that insurance coverage and co-pays are up to their individual insurance plans. Patient (and/or legal guardian) provides verbal consent for this visit to be billed to insurance. Services were provided through a video synchronous discussion virtually to substitute for in-person clinic visit. Celia Mcmillan, was evaluated through a synchronous (real-time) audio-video encounter. The patient (or guardian if applicable) is aware that this is a billable service, which includes applicable co-pays. This Virtual Visit was conducted with patient's (and/or legal guardian's) consent. Patient identification was verified, and a caregiver was present when appropriate.    The patient was located at Home: [address] [place] [address]  Provider was located at [address]): [facility]

## 2023-08-03 NOTE — PATIENT INSTRUCTIONS
[facility]ALIA- [phone], [email or web address]-[email or web address] RESOURCE FOR YOU  ER, crisis hotline (0479.671.2152), 911, text 66 973483 related to safety or emergency needs  [employer] and Navigator line for Alcohol and Other Drug Services Non-Emergency Line: [phone]    24/7 Alcohol and Other Drug Helpline for [facility]: [phone]   Please consider asking his PCP to link him with a

## 2023-08-21 ASSESSMENT — PATIENT HEALTH QUESTIONNAIRE - PHQ9
10. IF YOU CHECKED OFF ANY PROBLEMS, HOW DIFFICULT HAVE THESE PROBLEMS MADE IT FOR YOU TO DO YOUR WORK, TAKE CARE OF THINGS AT HOME, OR GET ALONG WITH OTHER PEOPLE: 2
3. TROUBLE FALLING OR STAYING ASLEEP: SEVERAL DAYS
SUM OF ALL RESPONSES TO PHQ QUESTIONS 1-9: 17
1. LITTLE INTEREST OR PLEASURE IN DOING THINGS: 2
8. MOVING OR SPEAKING SO SLOWLY THAT OTHER PEOPLE COULD HAVE NOTICED. OR THE OPPOSITE - BEING SO FIDGETY OR RESTLESS THAT YOU HAVE BEEN MOVING AROUND A LOT MORE THAN USUAL: SEVERAL DAYS
7. TROUBLE CONCENTRATING ON THINGS, SUCH AS READING THE NEWSPAPER OR WATCHING TELEVISION: 2
2. FEELING DOWN, DEPRESSED OR HOPELESS: MORE THAN HALF THE DAYS
SUM OF ALL RESPONSES TO PHQ QUESTIONS 1-9: 17
10. IF YOU CHECKED OFF ANY PROBLEMS, HOW DIFFICULT HAVE THESE PROBLEMS MADE IT FOR YOU TO DO YOUR WORK, TAKE CARE OF THINGS AT HOME, OR GET ALONG WITH OTHER PEOPLE: VERY DIFFICULT
4. FEELING TIRED OR HAVING LITTLE ENERGY: 3
5. POOR APPETITE OR OVEREATING: 3
4. FEELING TIRED OR HAVING LITTLE ENERGY: NEARLY EVERY DAY
1. LITTLE INTEREST OR PLEASURE IN DOING THINGS: MORE THAN HALF THE DAYS
2. FEELING DOWN, DEPRESSED OR HOPELESS: 2
9. THOUGHTS THAT YOU WOULD BE BETTER OFF DEAD, OR OF HURTING YOURSELF: SEVERAL DAYS
8. MOVING OR SPEAKING SO SLOWLY THAT OTHER PEOPLE COULD HAVE NOTICED. OR THE OPPOSITE, BEING SO FIGETY OR RESTLESS THAT YOU HAVE BEEN MOVING AROUND A LOT MORE THAN USUAL: 1
SUM OF ALL RESPONSES TO PHQ QUESTIONS 1-9: 17
5. POOR APPETITE OR OVEREATING: NEARLY EVERY DAY
6. FEELING BAD ABOUT YOURSELF - OR THAT YOU ARE A FAILURE OR HAVE LET YOURSELF OR YOUR FAMILY DOWN: 2
SUM OF ALL RESPONSES TO PHQ9 QUESTIONS 1 & 2: 4
3. TROUBLE FALLING OR STAYING ASLEEP: 1
6. FEELING BAD ABOUT YOURSELF - OR THAT YOU ARE A FAILURE OR HAVE LET YOURSELF OR YOUR FAMILY DOWN: MORE THAN HALF THE DAYS
7. TROUBLE CONCENTRATING ON THINGS, SUCH AS READING THE NEWSPAPER OR WATCHING TELEVISION: MORE THAN HALF THE DAYS
SUM OF ALL RESPONSES TO PHQ QUESTIONS 1-9: 16
SUM OF ALL RESPONSES TO PHQ QUESTIONS 1-9: 17
9. THOUGHTS THAT YOU WOULD BE BETTER OFF DEAD, OR OF HURTING YOURSELF: 1

## 2023-08-21 ASSESSMENT — ANXIETY QUESTIONNAIRES
7. FEELING AFRAID AS IF SOMETHING AWFUL MIGHT HAPPEN: SEVERAL DAYS
3. WORRYING TOO MUCH ABOUT DIFFERENT THINGS: SEVERAL DAYS
1. FEELING NERVOUS, ANXIOUS, OR ON EDGE: SEVERAL DAYS
4. TROUBLE RELAXING: 2
2. NOT BEING ABLE TO STOP OR CONTROL WORRYING: SEVERAL DAYS
IF YOU CHECKED OFF ANY PROBLEMS ON THIS QUESTIONNAIRE, HOW DIFFICULT HAVE THESE PROBLEMS MADE IT FOR YOU TO DO YOUR WORK, TAKE CARE OF THINGS AT HOME, OR GET ALONG WITH OTHER PEOPLE: SOMEWHAT DIFFICULT
6. BECOMING EASILY ANNOYED OR IRRITABLE: SEVERAL DAYS
6. BECOMING EASILY ANNOYED OR IRRITABLE: 1
IF YOU CHECKED OFF ANY PROBLEMS ON THIS QUESTIONNAIRE, HOW DIFFICULT HAVE THESE PROBLEMS MADE IT FOR YOU TO DO YOUR WORK, TAKE CARE OF THINGS AT HOME, OR GET ALONG WITH OTHER PEOPLE: SOMEWHAT DIFFICULT
1. FEELING NERVOUS, ANXIOUS, OR ON EDGE: 1
5. BEING SO RESTLESS THAT IT IS HARD TO SIT STILL: 0
GAD7 TOTAL SCORE: 7
7. FEELING AFRAID AS IF SOMETHING AWFUL MIGHT HAPPEN: 1
5. BEING SO RESTLESS THAT IT IS HARD TO SIT STILL: NOT AT ALL
3. WORRYING TOO MUCH ABOUT DIFFERENT THINGS: 1
4. TROUBLE RELAXING: MORE THAN HALF THE DAYS
2. NOT BEING ABLE TO STOP OR CONTROL WORRYING: 1

## 2023-08-21 ASSESSMENT — COLUMBIA-SUICIDE SEVERITY RATING SCALE - C-SSRS
4. IN THE PAST MONTH, HAVE YOU HAD THESE THOUGHTS AND HAD SOME INTENTION OF ACTING ON THEM?: NO
6. IN YOUR LIFETIME, HAVE YOU EVER DONE ANYTHING, STARTED TO DO ANYTHING, OR PREPARED TO DO ANYTHING TO END YOUR LIFE?: NO
2. IN THE PAST MONTH, HAVE YOU ACTUALLY HAD ANY THOUGHTS OF KILLING YOURSELF?: YES
5. IN THE PAST MONTH, HAVE YOU STARTED TO WORK OUT OR WORKED OUT THE DETAILS OF HOW TO KILL YOURSELF? DO YOU INTEND TO CARRY OUT THIS PLAN?: NO
1. IN THE PAST MONTH, HAVE YOU WISHED YOU WERE DEAD OR WISHED YOU COULD GO TO SLEEP AND NOT WAKE UP?: YES

## 2023-08-24 ENCOUNTER — TELEMEDICINE (OUTPATIENT)
Dept: BEHAVIORAL/MENTAL HEALTH CLINIC | Age: 68
End: 2023-08-24

## 2023-08-24 DIAGNOSIS — F33.1 MAJOR DEPRESSIVE DISORDER, RECURRENT EPISODE, MODERATE DEGREE (HCC): Primary | ICD-10-CM

## 2023-08-24 NOTE — PROGRESS NOTES
Behavioral Health Consultation  Key Reyes, Ph.D., [facility]  Psychologist  8/24/23  2:42 PM EDT      Time spent with Patient: 5 minutes  This is patient's second  [facility] appointment. Reason for Consult:  depression, anxiety, and stress  Referring Provider: Avtar uHssein, DO    TELEHEALTH EVALUATION -- Audio with Visual     Pt requested a virtual visit through a GlySuret Video Visit. Patient reports that they are located at home. Provider Location is at her office in [place]. Patient (and/or legal guardian) also understood that insurance coverage and co-pays are up to their individual insurance plans. Patient (and/or legal guardian) provides verbal consent for this visit to be billed to insurance. Services were provided through a video synchronous discussion virtually to substitute for in-person clinic visit. Dominick Hernandez, was evaluated through a synchronous (real-time) audio-video encounter. The patient (or guardian if applicable) is aware that this is a billable service, which includes applicable co-pays. This Virtual Visit was conducted with patient's (and/or legal guardian's) consent. Patient identification was verified, and a caregiver was present when appropriate. The patient was located at Home: [address] [facility]  Provider was located at [address]): [address]    [address] [place] [address]! Confirm you are appropriately licensed, registered, or certified to deliver care in the state where the patient is located as indicated above. If you are not or unsure, please re-schedule the visit. Are you appropriately licensed in the patient's state?: Yes         Total time spent for this encounter: 30 mins    --Key Reyes, PhD on 8/24/2023 at 2:42 PM    An electronic signature was used to authenticate this note. Feedback given to PCP. S:    Pt logged into the appt late and requested to reschedule for another time.      Pt endorses passive morbid ideation

## 2023-09-06 ASSESSMENT — ANXIETY QUESTIONNAIRES
GAD7 TOTAL SCORE: 8
3. WORRYING TOO MUCH ABOUT DIFFERENT THINGS: SEVERAL DAYS
6. BECOMING EASILY ANNOYED OR IRRITABLE: SEVERAL DAYS
2. NOT BEING ABLE TO STOP OR CONTROL WORRYING: SEVERAL DAYS
1. FEELING NERVOUS, ANXIOUS, OR ON EDGE: MORE THAN HALF THE DAYS
1. FEELING NERVOUS, ANXIOUS, OR ON EDGE: 2
IF YOU CHECKED OFF ANY PROBLEMS ON THIS QUESTIONNAIRE, HOW DIFFICULT HAVE THESE PROBLEMS MADE IT FOR YOU TO DO YOUR WORK, TAKE CARE OF THINGS AT HOME, OR GET ALONG WITH OTHER PEOPLE: SOMEWHAT DIFFICULT
7. FEELING AFRAID AS IF SOMETHING AWFUL MIGHT HAPPEN: 1
4. TROUBLE RELAXING: MORE THAN HALF THE DAYS
IF YOU CHECKED OFF ANY PROBLEMS ON THIS QUESTIONNAIRE, HOW DIFFICULT HAVE THESE PROBLEMS MADE IT FOR YOU TO DO YOUR WORK, TAKE CARE OF THINGS AT HOME, OR GET ALONG WITH OTHER PEOPLE: SOMEWHAT DIFFICULT
5. BEING SO RESTLESS THAT IT IS HARD TO SIT STILL: 0
2. NOT BEING ABLE TO STOP OR CONTROL WORRYING: 1
7. FEELING AFRAID AS IF SOMETHING AWFUL MIGHT HAPPEN: SEVERAL DAYS
5. BEING SO RESTLESS THAT IT IS HARD TO SIT STILL: NOT AT ALL
6. BECOMING EASILY ANNOYED OR IRRITABLE: 1
3. WORRYING TOO MUCH ABOUT DIFFERENT THINGS: 1
4. TROUBLE RELAXING: 2

## 2023-09-07 ENCOUNTER — TELEMEDICINE (OUTPATIENT)
Dept: BEHAVIORAL/MENTAL HEALTH CLINIC | Age: 68
End: 2023-09-07
Payer: MEDICARE

## 2023-09-07 DIAGNOSIS — Z63.6 CAREGIVER STRESS: ICD-10-CM

## 2023-09-07 DIAGNOSIS — F33.1 MAJOR DEPRESSIVE DISORDER, RECURRENT EPISODE, MODERATE DEGREE (HCC): Primary | ICD-10-CM

## 2023-09-07 PROCEDURE — 1123F ACP DISCUSS/DSCN MKR DOCD: CPT | Performed by: PSYCHOLOGIST

## 2023-09-07 PROCEDURE — 90832 PSYTX W PT 30 MINUTES: CPT | Performed by: PSYCHOLOGIST

## 2023-09-07 SDOH — SOCIAL STABILITY - SOCIAL INSECURITY: DEPENDENT RELATIVE NEEDING CARE AT HOME: Z63.6

## 2023-09-07 ASSESSMENT — PATIENT HEALTH QUESTIONNAIRE - PHQ9
6. FEELING BAD ABOUT YOURSELF - OR THAT YOU ARE A FAILURE OR HAVE LET YOURSELF OR YOUR FAMILY DOWN: 2
3. TROUBLE FALLING OR STAYING ASLEEP: 3
SUM OF ALL RESPONSES TO PHQ QUESTIONS 1-9: 15
1. LITTLE INTEREST OR PLEASURE IN DOING THINGS: MORE THAN HALF THE DAYS
1. LITTLE INTEREST OR PLEASURE IN DOING THINGS: 2
7. TROUBLE CONCENTRATING ON THINGS, SUCH AS READING THE NEWSPAPER OR WATCHING TELEVISION: SEVERAL DAYS
10. IF YOU CHECKED OFF ANY PROBLEMS, HOW DIFFICULT HAVE THESE PROBLEMS MADE IT FOR YOU TO DO YOUR WORK, TAKE CARE OF THINGS AT HOME, OR GET ALONG WITH OTHER PEOPLE: VERY DIFFICULT
8. MOVING OR SPEAKING SO SLOWLY THAT OTHER PEOPLE COULD HAVE NOTICED. OR THE OPPOSITE, BEING SO FIGETY OR RESTLESS THAT YOU HAVE BEEN MOVING AROUND A LOT MORE THAN USUAL: 0
7. TROUBLE CONCENTRATING ON THINGS, SUCH AS READING THE NEWSPAPER OR WATCHING TELEVISION: 1
SUM OF ALL RESPONSES TO PHQ QUESTIONS 1-9: 15
9. THOUGHTS THAT YOU WOULD BE BETTER OFF DEAD, OR OF HURTING YOURSELF: SEVERAL DAYS
2. FEELING DOWN, DEPRESSED OR HOPELESS: MORE THAN HALF THE DAYS
4. FEELING TIRED OR HAVING LITTLE ENERGY: 2
5. POOR APPETITE OR OVEREATING: 2
SUM OF ALL RESPONSES TO PHQ9 QUESTIONS 1 & 2: 4
6. FEELING BAD ABOUT YOURSELF - OR THAT YOU ARE A FAILURE OR HAVE LET YOURSELF OR YOUR FAMILY DOWN: MORE THAN HALF THE DAYS
3. TROUBLE FALLING OR STAYING ASLEEP: NEARLY EVERY DAY
4. FEELING TIRED OR HAVING LITTLE ENERGY: MORE THAN HALF THE DAYS
8. MOVING OR SPEAKING SO SLOWLY THAT OTHER PEOPLE COULD HAVE NOTICED. OR THE OPPOSITE - BEING SO FIDGETY OR RESTLESS THAT YOU HAVE BEEN MOVING AROUND A LOT MORE THAN USUAL: NOT AT ALL
SUM OF ALL RESPONSES TO PHQ QUESTIONS 1-9: 15
SUM OF ALL RESPONSES TO PHQ QUESTIONS 1-9: 14
9. THOUGHTS THAT YOU WOULD BE BETTER OFF DEAD, OR OF HURTING YOURSELF: 1
10. IF YOU CHECKED OFF ANY PROBLEMS, HOW DIFFICULT HAVE THESE PROBLEMS MADE IT FOR YOU TO DO YOUR WORK, TAKE CARE OF THINGS AT HOME, OR GET ALONG WITH OTHER PEOPLE: 2
SUM OF ALL RESPONSES TO PHQ QUESTIONS 1-9: 15
2. FEELING DOWN, DEPRESSED OR HOPELESS: 2
5. POOR APPETITE OR OVEREATING: MORE THAN HALF THE DAYS

## 2023-09-07 ASSESSMENT — COLUMBIA-SUICIDE SEVERITY RATING SCALE - C-SSRS
1. IN THE PAST MONTH, HAVE YOU WISHED YOU WERE DEAD OR WISHED YOU COULD GO TO SLEEP AND NOT WAKE UP?: YES
6. IN YOUR LIFETIME, HAVE YOU EVER DONE ANYTHING, STARTED TO DO ANYTHING, OR PREPARED TO DO ANYTHING TO END YOUR LIFE?: NO
2. IN THE PAST MONTH, HAVE YOU ACTUALLY HAD ANY THOUGHTS OF KILLING YOURSELF?: NO

## 2023-09-07 NOTE — PROGRESS NOTES
Behavioral Health Consultation  Key Garcia, Ph.D., [facility]  Psychologist  9/7/23  10:59 AM EDT      Time spent with Patient: 30 minutes  This is patient's second  [facility] appointment. Reason for Consult:  depression, anxiety, and stress  Referring Provider: Cezar Ott DO      Feedback given to PCP. S:      Pt reports feeling \"tired\" and notes she thinks it could be realted to her medications for her headaches . Pt has a MRI scheduled next week. Pt provided an update on fucntioning. Reviewed application of [facility] interventions since last appt, notes a variety of frustration with linking spouse with services, coordination of care, possible in home care, respite care. Pt endorses passive morbid ideation but specifically states there is no plan, no intent and the Pt feels safe, verbally anibal for safety. Pt denies current SI/HI.        O:  MSE:    Appearance    alert, cooperative  Appetite abnormal  Sleep disturbance Yes  Fatigue Yes  Loss of pleasure Yes  Impulsive behavior Yes  Speech    spontaneous, normal rate, and normal volume  Mood    Depressed  Affect    depressed affect  Thought Content    intact  Thought Process    coherent  Associations    logical connections  Insight    Fair  Judgment    Intact  Orientation    oriented to person, place, time, and general circumstances  Memory    recent and remote memory intact  Attention/Concentration    intact in appt, reports as a concern  Morbid ideation Yes  Suicide Assessment    no suicidal ideation      History:    Medications:   Current Outpatient Medications   Medication Sig Dispense Refill    HUMALOG KWIKPEN 100 UNIT/ML SOPN Inject 20 Units into the skin 3 times daily (with meals)      topiramate (TOPAMAX) 25 MG tablet Take 25 mg by mouth nightly      SUMAtriptan (IMITREX) 50 MG tablet Take 1 tablet by mouth as needed      ondansetron (ZOFRAN) 8 MG tablet Take 8 mg by mouth every 8 hours as needed      Insulin Degludec (TRESIBA FLEXTOUCH) 200

## 2023-09-07 NOTE — PATIENT INSTRUCTIONS
As a resident of [address] [place] [address] to find out where appropraite services are- Navigator line for Alcohol and Other Drug Services Non-Emergency Line:[phone]    Follow up with the  referral      Let's start with self talk! Speak to yourself the way that you would a friend. 2.  Watch for your use of comparison. Comparison is a great strategy for perspective        UNLESS you lack self-compassion. 3.  There are three main components of self-compassion: acknowledging my own       suffering (in the context of my life), remember that we all suffer, and practicing        self-kindness. See below.

## 2023-09-27 ASSESSMENT — ANXIETY QUESTIONNAIRES
3. WORRYING TOO MUCH ABOUT DIFFERENT THINGS: NEARLY EVERY DAY
4. TROUBLE RELAXING: 3
4. TROUBLE RELAXING: NEARLY EVERY DAY
7. FEELING AFRAID AS IF SOMETHING AWFUL MIGHT HAPPEN: SEVERAL DAYS
6. BECOMING EASILY ANNOYED OR IRRITABLE: SEVERAL DAYS
7. FEELING AFRAID AS IF SOMETHING AWFUL MIGHT HAPPEN: 1
IF YOU CHECKED OFF ANY PROBLEMS ON THIS QUESTIONNAIRE, HOW DIFFICULT HAVE THESE PROBLEMS MADE IT FOR YOU TO DO YOUR WORK, TAKE CARE OF THINGS AT HOME, OR GET ALONG WITH OTHER PEOPLE: VERY DIFFICULT
5. BEING SO RESTLESS THAT IT IS HARD TO SIT STILL: 0
3. WORRYING TOO MUCH ABOUT DIFFERENT THINGS: 3
2. NOT BEING ABLE TO STOP OR CONTROL WORRYING: NEARLY EVERY DAY
6. BECOMING EASILY ANNOYED OR IRRITABLE: 1
2. NOT BEING ABLE TO STOP OR CONTROL WORRYING: 3
5. BEING SO RESTLESS THAT IT IS HARD TO SIT STILL: NOT AT ALL
IF YOU CHECKED OFF ANY PROBLEMS ON THIS QUESTIONNAIRE, HOW DIFFICULT HAVE THESE PROBLEMS MADE IT FOR YOU TO DO YOUR WORK, TAKE CARE OF THINGS AT HOME, OR GET ALONG WITH OTHER PEOPLE: VERY DIFFICULT

## 2023-09-28 ENCOUNTER — TELEMEDICINE (OUTPATIENT)
Dept: BEHAVIORAL/MENTAL HEALTH CLINIC | Age: 68
End: 2023-09-28
Payer: MEDICARE

## 2023-09-28 DIAGNOSIS — Z63.6 CAREGIVER STRESS: ICD-10-CM

## 2023-09-28 DIAGNOSIS — F33.1 MAJOR DEPRESSIVE DISORDER, RECURRENT EPISODE, MODERATE DEGREE (HCC): Primary | ICD-10-CM

## 2023-09-28 PROCEDURE — 90832 PSYTX W PT 30 MINUTES: CPT | Performed by: PSYCHOLOGIST

## 2023-09-28 PROCEDURE — 1123F ACP DISCUSS/DSCN MKR DOCD: CPT | Performed by: PSYCHOLOGIST

## 2023-09-28 SDOH — SOCIAL STABILITY - SOCIAL INSECURITY: DEPENDENT RELATIVE NEEDING CARE AT HOME: Z63.6

## 2023-09-28 ASSESSMENT — PATIENT HEALTH QUESTIONNAIRE - PHQ9
5. POOR APPETITE OR OVEREATING: 3
SUM OF ALL RESPONSES TO PHQ9 QUESTIONS 1 & 2: 3
SUM OF ALL RESPONSES TO PHQ QUESTIONS 1-9: 11
7. TROUBLE CONCENTRATING ON THINGS, SUCH AS READING THE NEWSPAPER OR WATCHING TELEVISION: 1
1. LITTLE INTEREST OR PLEASURE IN DOING THINGS: 1
9. THOUGHTS THAT YOU WOULD BE BETTER OFF DEAD, OR OF HURTING YOURSELF: 0
8. MOVING OR SPEAKING SO SLOWLY THAT OTHER PEOPLE COULD HAVE NOTICED. OR THE OPPOSITE, BEING SO FIGETY OR RESTLESS THAT YOU HAVE BEEN MOVING AROUND A LOT MORE THAN USUAL: 0
SUM OF ALL RESPONSES TO PHQ QUESTIONS 1-9: 11
6. FEELING BAD ABOUT YOURSELF - OR THAT YOU ARE A FAILURE OR HAVE LET YOURSELF OR YOUR FAMILY DOWN: 1
4. FEELING TIRED OR HAVING LITTLE ENERGY: 3
SUM OF ALL RESPONSES TO PHQ QUESTIONS 1-9: 11
10. IF YOU CHECKED OFF ANY PROBLEMS, HOW DIFFICULT HAVE THESE PROBLEMS MADE IT FOR YOU TO DO YOUR WORK, TAKE CARE OF THINGS AT HOME, OR GET ALONG WITH OTHER PEOPLE: 1
3. TROUBLE FALLING OR STAYING ASLEEP: 0
SUM OF ALL RESPONSES TO PHQ QUESTIONS 1-9: 11
2. FEELING DOWN, DEPRESSED OR HOPELESS: 2
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
## 2023-10-16 ASSESSMENT — ANXIETY QUESTIONNAIRES
2. NOT BEING ABLE TO STOP OR CONTROL WORRYING: 1
5. BEING SO RESTLESS THAT IT IS HARD TO SIT STILL: NOT AT ALL
3. WORRYING TOO MUCH ABOUT DIFFERENT THINGS: 1
5. BEING SO RESTLESS THAT IT IS HARD TO SIT STILL: 0
6. BECOMING EASILY ANNOYED OR IRRITABLE: SEVERAL DAYS
GAD7 TOTAL SCORE: 6
7. FEELING AFRAID AS IF SOMETHING AWFUL MIGHT HAPPEN: 1
1. FEELING NERVOUS, ANXIOUS, OR ON EDGE: SEVERAL DAYS
4. TROUBLE RELAXING: SEVERAL DAYS
6. BECOMING EASILY ANNOYED OR IRRITABLE: 1
3. WORRYING TOO MUCH ABOUT DIFFERENT THINGS: SEVERAL DAYS
4. TROUBLE RELAXING: 1
1. FEELING NERVOUS, ANXIOUS, OR ON EDGE: 1
7. FEELING AFRAID AS IF SOMETHING AWFUL MIGHT HAPPEN: SEVERAL DAYS
2. NOT BEING ABLE TO STOP OR CONTROL WORRYING: SEVERAL DAYS

## 2023-10-16 ASSESSMENT — PATIENT HEALTH QUESTIONNAIRE - PHQ9
10. IF YOU CHECKED OFF ANY PROBLEMS, HOW DIFFICULT HAVE THESE PROBLEMS MADE IT FOR YOU TO DO YOUR WORK, TAKE CARE OF THINGS AT HOME, OR GET ALONG WITH OTHER PEOPLE: 3
7. TROUBLE CONCENTRATING ON THINGS, SUCH AS READING THE NEWSPAPER OR WATCHING TELEVISION: MORE THAN HALF THE DAYS
1. LITTLE INTEREST OR PLEASURE IN DOING THINGS: NEARLY EVERY DAY
5. POOR APPETITE OR OVEREATING: NEARLY EVERY DAY
2. FEELING DOWN, DEPRESSED OR HOPELESS: MORE THAN HALF THE DAYS
9. THOUGHTS THAT YOU WOULD BE BETTER OFF DEAD, OR OF HURTING YOURSELF: SEVERAL DAYS
SUM OF ALL RESPONSES TO PHQ QUESTIONS 1-9: 17
4. FEELING TIRED OR HAVING LITTLE ENERGY: NEARLY EVERY DAY
SUM OF ALL RESPONSES TO PHQ QUESTIONS 1-9: 17
4. FEELING TIRED OR HAVING LITTLE ENERGY: 3
8. MOVING OR SPEAKING SO SLOWLY THAT OTHER PEOPLE COULD HAVE NOTICED. OR THE OPPOSITE - BEING SO FIDGETY OR RESTLESS THAT YOU HAVE BEEN MOVING AROUND A LOT MORE THAN USUAL: NOT AT ALL
9. THOUGHTS THAT YOU WOULD BE BETTER OFF DEAD, OR OF HURTING YOURSELF: 1
SUM OF ALL RESPONSES TO PHQ QUESTIONS 1-9: 17
3. TROUBLE FALLING OR STAYING ASLEEP: 1
8. MOVING OR SPEAKING SO SLOWLY THAT OTHER PEOPLE COULD HAVE NOTICED. OR THE OPPOSITE, BEING SO FIGETY OR RESTLESS THAT YOU HAVE BEEN MOVING AROUND A LOT MORE THAN USUAL: 0
2. FEELING DOWN, DEPRESSED OR HOPELESS: 2
5. POOR APPETITE OR OVEREATING: 3
3. TROUBLE FALLING OR STAYING ASLEEP: SEVERAL DAYS
10. IF YOU CHECKED OFF ANY PROBLEMS, HOW DIFFICULT HAVE THESE PROBLEMS MADE IT FOR YOU TO DO YOUR WORK, TAKE CARE OF THINGS AT HOME, OR GET ALONG WITH OTHER PEOPLE: EXTREMELY DIFFICULT
1. LITTLE INTEREST OR PLEASURE IN DOING THINGS: 3
SUM OF ALL RESPONSES TO PHQ QUESTIONS 1-9: 16
SUM OF ALL RESPONSES TO PHQ QUESTIONS 1-9: 17
6. FEELING BAD ABOUT YOURSELF - OR THAT YOU ARE A FAILURE OR HAVE LET YOURSELF OR YOUR FAMILY DOWN: MORE THAN HALF THE DAYS
SUM OF ALL RESPONSES TO PHQ9 QUESTIONS 1 & 2: 5
6. FEELING BAD ABOUT YOURSELF - OR THAT YOU ARE A FAILURE OR HAVE LET YOURSELF OR YOUR FAMILY DOWN: 2
7. TROUBLE CONCENTRATING ON THINGS, SUCH AS READING THE NEWSPAPER OR WATCHING TELEVISION: 2

## 2023-10-16 ASSESSMENT — COLUMBIA-SUICIDE SEVERITY RATING SCALE - C-SSRS
1. IN THE PAST MONTH, HAVE YOU WISHED YOU WERE DEAD OR WISHED YOU COULD GO TO SLEEP AND NOT WAKE UP?: NO
6. IN YOUR LIFETIME, HAVE YOU EVER DONE ANYTHING, STARTED TO DO ANYTHING, OR PREPARED TO DO ANYTHING TO END YOUR LIFE?: NO
2. IN THE PAST MONTH, HAVE YOU ACTUALLY HAD ANY THOUGHTS OF KILLING YOURSELF?: NO

## 2023-10-19 ENCOUNTER — TELEMEDICINE (OUTPATIENT)
Dept: BEHAVIORAL/MENTAL HEALTH CLINIC | Age: 68
End: 2023-10-19
Payer: MEDICARE

## 2023-10-19 DIAGNOSIS — Z63.6 CAREGIVER STRESS: ICD-10-CM

## 2023-10-19 DIAGNOSIS — F33.1 MAJOR DEPRESSIVE DISORDER, RECURRENT EPISODE, MODERATE DEGREE (HCC): Primary | ICD-10-CM

## 2023-10-19 PROCEDURE — 1123F ACP DISCUSS/DSCN MKR DOCD: CPT | Performed by: PSYCHOLOGIST

## 2023-10-19 PROCEDURE — 90832 PSYTX W PT 30 MINUTES: CPT | Performed by: PSYCHOLOGIST

## 2023-10-19 SDOH — SOCIAL STABILITY - SOCIAL INSECURITY: DEPENDENT RELATIVE NEEDING CARE AT HOME: Z63.6
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
## 2023-10-19 NOTE — PATIENT INSTRUCTIONS
If he is a risk to himself or a risk to you- go to the [facility]  Crisis center- [phone]  What am I willing to live with and what am I willing to live without?   Call the Board and ask to speak to a  to help link him

## 2023-11-08 ASSESSMENT — ANXIETY QUESTIONNAIRES
GAD7 TOTAL SCORE: 9
3. WORRYING TOO MUCH ABOUT DIFFERENT THINGS: MORE THAN HALF THE DAYS
IF YOU CHECKED OFF ANY PROBLEMS ON THIS QUESTIONNAIRE, HOW DIFFICULT HAVE THESE PROBLEMS MADE IT FOR YOU TO DO YOUR WORK, TAKE CARE OF THINGS AT HOME, OR GET ALONG WITH OTHER PEOPLE: VERY DIFFICULT
2. NOT BEING ABLE TO STOP OR CONTROL WORRYING: MORE THAN HALF THE DAYS
5. BEING SO RESTLESS THAT IT IS HARD TO SIT STILL: NOT AT ALL
7. FEELING AFRAID AS IF SOMETHING AWFUL MIGHT HAPPEN: SEVERAL DAYS
6. BECOMING EASILY ANNOYED OR IRRITABLE: 1
3. WORRYING TOO MUCH ABOUT DIFFERENT THINGS: 2
IF YOU CHECKED OFF ANY PROBLEMS ON THIS QUESTIONNAIRE, HOW DIFFICULT HAVE THESE PROBLEMS MADE IT FOR YOU TO DO YOUR WORK, TAKE CARE OF THINGS AT HOME, OR GET ALONG WITH OTHER PEOPLE: VERY DIFFICULT
6. BECOMING EASILY ANNOYED OR IRRITABLE: SEVERAL DAYS
1. FEELING NERVOUS, ANXIOUS, OR ON EDGE: 2
7. FEELING AFRAID AS IF SOMETHING AWFUL MIGHT HAPPEN: 1
2. NOT BEING ABLE TO STOP OR CONTROL WORRYING: 2
4. TROUBLE RELAXING: 1
1. FEELING NERVOUS, ANXIOUS, OR ON EDGE: MORE THAN HALF THE DAYS
4. TROUBLE RELAXING: SEVERAL DAYS
5. BEING SO RESTLESS THAT IT IS HARD TO SIT STILL: 0

## 2023-11-08 ASSESSMENT — PATIENT HEALTH QUESTIONNAIRE - PHQ9
4. FEELING TIRED OR HAVING LITTLE ENERGY: MORE THAN HALF THE DAYS
3. TROUBLE FALLING OR STAYING ASLEEP: SEVERAL DAYS
8. MOVING OR SPEAKING SO SLOWLY THAT OTHER PEOPLE COULD HAVE NOTICED. OR THE OPPOSITE, BEING SO FIGETY OR RESTLESS THAT YOU HAVE BEEN MOVING AROUND A LOT MORE THAN USUAL: 1
6. FEELING BAD ABOUT YOURSELF - OR THAT YOU ARE A FAILURE OR HAVE LET YOURSELF OR YOUR FAMILY DOWN: MORE THAN HALF THE DAYS
10. IF YOU CHECKED OFF ANY PROBLEMS, HOW DIFFICULT HAVE THESE PROBLEMS MADE IT FOR YOU TO DO YOUR WORK, TAKE CARE OF THINGS AT HOME, OR GET ALONG WITH OTHER PEOPLE: 2
2. FEELING DOWN, DEPRESSED OR HOPELESS: MORE THAN HALF THE DAYS
7. TROUBLE CONCENTRATING ON THINGS, SUCH AS READING THE NEWSPAPER OR WATCHING TELEVISION: 1
9. THOUGHTS THAT YOU WOULD BE BETTER OFF DEAD, OR OF HURTING YOURSELF: 1
9. THOUGHTS THAT YOU WOULD BE BETTER OFF DEAD, OR OF HURTING YOURSELF: SEVERAL DAYS
7. TROUBLE CONCENTRATING ON THINGS, SUCH AS READING THE NEWSPAPER OR WATCHING TELEVISION: SEVERAL DAYS
1. LITTLE INTEREST OR PLEASURE IN DOING THINGS: MORE THAN HALF THE DAYS
SUM OF ALL RESPONSES TO PHQ QUESTIONS 1-9: 15
8. MOVING OR SPEAKING SO SLOWLY THAT OTHER PEOPLE COULD HAVE NOTICED. OR THE OPPOSITE - BEING SO FIDGETY OR RESTLESS THAT YOU HAVE BEEN MOVING AROUND A LOT MORE THAN USUAL: SEVERAL DAYS
SUM OF ALL RESPONSES TO PHQ QUESTIONS 1-9: 15
3. TROUBLE FALLING OR STAYING ASLEEP: 1
5. POOR APPETITE OR OVEREATING: NEARLY EVERY DAY
4. FEELING TIRED OR HAVING LITTLE ENERGY: 2
10. IF YOU CHECKED OFF ANY PROBLEMS, HOW DIFFICULT HAVE THESE PROBLEMS MADE IT FOR YOU TO DO YOUR WORK, TAKE CARE OF THINGS AT HOME, OR GET ALONG WITH OTHER PEOPLE: VERY DIFFICULT
1. LITTLE INTEREST OR PLEASURE IN DOING THINGS: 2
2. FEELING DOWN, DEPRESSED OR HOPELESS: 2
SUM OF ALL RESPONSES TO PHQ QUESTIONS 1-9: 15
SUM OF ALL RESPONSES TO PHQ9 QUESTIONS 1 & 2: 4
6. FEELING BAD ABOUT YOURSELF - OR THAT YOU ARE A FAILURE OR HAVE LET YOURSELF OR YOUR FAMILY DOWN: 2
SUM OF ALL RESPONSES TO PHQ QUESTIONS 1-9: 15
5. POOR APPETITE OR OVEREATING: 3
SUM OF ALL RESPONSES TO PHQ QUESTIONS 1-9: 14

## 2023-11-08 ASSESSMENT — COLUMBIA-SUICIDE SEVERITY RATING SCALE - C-SSRS
6. IN YOUR LIFETIME, HAVE YOU EVER DONE ANYTHING, STARTED TO DO ANYTHING, OR PREPARED TO DO ANYTHING TO END YOUR LIFE?: NO
2. IN THE PAST MONTH, HAVE YOU ACTUALLY HAD ANY THOUGHTS OF KILLING YOURSELF?: NO
1. IN THE PAST MONTH, HAVE YOU WISHED YOU WERE DEAD OR WISHED YOU COULD GO TO SLEEP AND NOT WAKE UP?: NO

## 2023-11-09 ENCOUNTER — TELEMEDICINE (OUTPATIENT)
Dept: BEHAVIORAL/MENTAL HEALTH CLINIC | Age: 68
End: 2023-11-09
Payer: MEDICARE

## 2023-11-09 DIAGNOSIS — F33.1 MAJOR DEPRESSIVE DISORDER, RECURRENT EPISODE, MODERATE DEGREE (HCC): Primary | ICD-10-CM

## 2023-11-09 DIAGNOSIS — Z63.6 CAREGIVER STRESS: ICD-10-CM

## 2023-11-09 PROCEDURE — 90832 PSYTX W PT 30 MINUTES: CPT | Performed by: PSYCHOLOGIST

## 2023-11-09 PROCEDURE — 1123F ACP DISCUSS/DSCN MKR DOCD: CPT | Performed by: PSYCHOLOGIST

## 2023-11-09 SDOH — SOCIAL STABILITY - SOCIAL INSECURITY: DEPENDENT RELATIVE NEEDING CARE AT HOME: Z63.6

## 2023-11-09 NOTE — PATIENT INSTRUCTIONS
Speak to that  before the appt.   You could always just revisit the [place] literature   Consider the meaningful activity  Follow up as scheduled
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
## 2023-12-07 ENCOUNTER — TELEMEDICINE (OUTPATIENT)
Dept: BEHAVIORAL/MENTAL HEALTH CLINIC | Age: 68
End: 2023-12-07
Payer: MEDICARE

## 2023-12-07 DIAGNOSIS — F33.1 MAJOR DEPRESSIVE DISORDER, RECURRENT EPISODE, MODERATE DEGREE (HCC): Primary | ICD-10-CM

## 2023-12-07 DIAGNOSIS — Z63.6 CAREGIVER STRESS: ICD-10-CM

## 2023-12-07 PROCEDURE — 90832 PSYTX W PT 30 MINUTES: CPT | Performed by: PSYCHOLOGIST

## 2023-12-07 PROCEDURE — 1123F ACP DISCUSS/DSCN MKR DOCD: CPT | Performed by: PSYCHOLOGIST

## 2023-12-07 SDOH — SOCIAL STABILITY - SOCIAL INSECURITY: DEPENDENT RELATIVE NEEDING CARE AT HOME: Z63.6

## 2023-12-07 ASSESSMENT — PATIENT HEALTH QUESTIONNAIRE - PHQ9
2. FEELING DOWN, DEPRESSED OR HOPELESS: 1
8. MOVING OR SPEAKING SO SLOWLY THAT OTHER PEOPLE COULD HAVE NOTICED. OR THE OPPOSITE, BEING SO FIGETY OR RESTLESS THAT YOU HAVE BEEN MOVING AROUND A LOT MORE THAN USUAL: 1
SUM OF ALL RESPONSES TO PHQ QUESTIONS 1-9: 11
10. IF YOU CHECKED OFF ANY PROBLEMS, HOW DIFFICULT HAVE THESE PROBLEMS MADE IT FOR YOU TO DO YOUR WORK, TAKE CARE OF THINGS AT HOME, OR GET ALONG WITH OTHER PEOPLE: 1
SUM OF ALL RESPONSES TO PHQ9 QUESTIONS 1 & 2: 3
5. POOR APPETITE OR OVEREATING: 0
7. TROUBLE CONCENTRATING ON THINGS, SUCH AS READING THE NEWSPAPER OR WATCHING TELEVISION: 1
3. TROUBLE FALLING OR STAYING ASLEEP: 3
9. THOUGHTS THAT YOU WOULD BE BETTER OFF DEAD, OR OF HURTING YOURSELF: 0
SUM OF ALL RESPONSES TO PHQ QUESTIONS 1-9: 11
4. FEELING TIRED OR HAVING LITTLE ENERGY: 3
SUM OF ALL RESPONSES TO PHQ QUESTIONS 1-9: 11
SUM OF ALL RESPONSES TO PHQ QUESTIONS 1-9: 11
1. LITTLE INTEREST OR PLEASURE IN DOING THINGS: 2
6. FEELING BAD ABOUT YOURSELF - OR THAT YOU ARE A FAILURE OR HAVE LET YOURSELF OR YOUR FAMILY DOWN: 0

## 2023-12-07 NOTE — PATIENT INSTRUCTIONS
[facility] office on Aging- in home cleaning services, etc.  Schedule the time out of the home to be consistent with when visitors  Consider that rewards system related to motivation.    Follow up as scheduled
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
## 2024-01-10 ENCOUNTER — OFFICE VISIT (OUTPATIENT)
Dept: PODIATRY | Age: 69
End: 2024-01-10
Payer: MEDICARE

## 2024-01-10 VITALS — TEMPERATURE: 97.4 F | HEIGHT: 70 IN | BODY MASS INDEX: 31.5 KG/M2 | WEIGHT: 220 LBS

## 2024-01-10 DIAGNOSIS — M21.621 TAILOR'S BUNIONETTE, BILATERAL: ICD-10-CM

## 2024-01-10 DIAGNOSIS — Z91.81 HISTORY OF RECENT FALL: ICD-10-CM

## 2024-01-10 DIAGNOSIS — E11.42 DIABETIC POLYNEUROPATHY ASSOCIATED WITH TYPE 2 DIABETES MELLITUS (HCC): ICD-10-CM

## 2024-01-10 DIAGNOSIS — M21.622 TAILOR'S BUNIONETTE, BILATERAL: ICD-10-CM

## 2024-01-10 DIAGNOSIS — M20.41 HAMMER TOES OF BOTH FEET: ICD-10-CM

## 2024-01-10 DIAGNOSIS — M79.671 PAIN IN BOTH FEET: Primary | ICD-10-CM

## 2024-01-10 DIAGNOSIS — M20.42 HAMMER TOES OF BOTH FEET: ICD-10-CM

## 2024-01-10 DIAGNOSIS — M79.672 PAIN IN BOTH FEET: Primary | ICD-10-CM

## 2024-01-10 DIAGNOSIS — M20.11 HALLUX ABDUCTO VALGUS, BILATERAL: ICD-10-CM

## 2024-01-10 DIAGNOSIS — M20.12 HALLUX ABDUCTO VALGUS, BILATERAL: ICD-10-CM

## 2024-01-10 DIAGNOSIS — R26.9 GAIT ABNORMALITY: ICD-10-CM

## 2024-01-10 PROCEDURE — G8417 CALC BMI ABV UP PARAM F/U: HCPCS | Performed by: PODIATRIST

## 2024-01-10 PROCEDURE — 2022F DILAT RTA XM EVC RTNOPTHY: CPT | Performed by: PODIATRIST

## 2024-01-10 PROCEDURE — G8484 FLU IMMUNIZE NO ADMIN: HCPCS | Performed by: PODIATRIST

## 2024-01-10 PROCEDURE — 99203 OFFICE O/P NEW LOW 30 MIN: CPT | Performed by: PODIATRIST

## 2024-01-10 PROCEDURE — 3046F HEMOGLOBIN A1C LEVEL >9.0%: CPT | Performed by: PODIATRIST

## 2024-01-10 PROCEDURE — 3017F COLORECTAL CA SCREEN DOC REV: CPT | Performed by: PODIATRIST

## 2024-01-10 PROCEDURE — 1123F ACP DISCUSS/DSCN MKR DOCD: CPT | Performed by: PODIATRIST

## 2024-01-10 PROCEDURE — G8400 PT W/DXA NO RESULTS DOC: HCPCS | Performed by: PODIATRIST

## 2024-01-10 PROCEDURE — G8427 DOCREV CUR MEDS BY ELIG CLIN: HCPCS | Performed by: PODIATRIST

## 2024-01-10 PROCEDURE — 1090F PRES/ABSN URINE INCON ASSESS: CPT | Performed by: PODIATRIST

## 2024-01-10 PROCEDURE — 1036F TOBACCO NON-USER: CPT | Performed by: PODIATRIST

## 2024-01-10 ASSESSMENT — ENCOUNTER SYMPTOMS
NAUSEA: 0
VOMITING: 0
BACK PAIN: 1
SHORTNESS OF BREATH: 1

## 2024-01-10 NOTE — PROGRESS NOTES
[facility] PHYSICIANS [place] SPECIALTY [facility] PODIATRY  [address]  [place] [address]  Dept: [phone]  Loc: [phone]       Orquidea Cleary  (1955)    1/10/24    Subjective     Orquidea Cleary is 68 y.o. female who complains today of:    Chief Complaint   Patient presents with    Diabetes    Foot Pain     Both feet       Diabetes  Pertinent negatives for diabetes include no chest pain.   Foot Pain   Associated symptoms include numbness. Pertinent negatives include no fever.     HPI: Patient presents for diabetic foot exam.  The patient uses insulin.  Patient's most recent hemoglobin A1c was 7.3%.  Patient's most recent blood glucose reading was 140 mg/dL.  Patient reports having numbness and tingling to the bilateral foot.  Patient denies a history of diabetic foot ulceration.  Patient states she has had diabetic shoes in the past and would like a prescription for a new pair.  Patient reports that her toes are often purple after standing for short periods and when she wakes up in the morning, she states this goes away if she elevates her feet.  Patient also expresses concerns about problems with her balance.  Patient states that when she first arises from a seated position that she has difficulty keeping her balance.  Patient states she was just assessed by neurology, they have ordered multiple test and made a diagnosis of cerebral amyloid plaques.    Review of Systems   Constitutional:  Negative for chills and fever.   HENT:  Negative for hearing loss.    Respiratory:  Positive for shortness of breath.    Cardiovascular:  Negative for chest pain.   Gastrointestinal:  Negative for nausea and vomiting.   Genitourinary:  Negative for difficulty urinating.   Musculoskeletal:  Positive for back pain and gait problem.   Skin:  Negative for wound.   Neurological:  Positive for numbness.   Hematological:  Does not bruise/bleed easily.

## 2024-01-11 ENCOUNTER — TELEMEDICINE (OUTPATIENT)
Dept: BEHAVIORAL/MENTAL HEALTH CLINIC | Age: 69
End: 2024-01-11
Payer: MEDICARE

## 2024-01-11 DIAGNOSIS — F33.1 MAJOR DEPRESSIVE DISORDER, RECURRENT EPISODE, MODERATE DEGREE (HCC): Primary | ICD-10-CM

## 2024-01-11 DIAGNOSIS — Z63.6 CAREGIVER STRESS: ICD-10-CM

## 2024-01-11 PROCEDURE — 1123F ACP DISCUSS/DSCN MKR DOCD: CPT | Performed by: PSYCHOLOGIST

## 2024-01-11 PROCEDURE — 90832 PSYTX W PT 30 MINUTES: CPT | Performed by: PSYCHOLOGIST

## 2024-01-11 SDOH — SOCIAL STABILITY - SOCIAL INSECURITY: DEPENDENT RELATIVE NEEDING CARE AT HOME: Z63.6

## 2024-01-11 ASSESSMENT — PATIENT HEALTH QUESTIONNAIRE - PHQ9
9. THOUGHTS THAT YOU WOULD BE BETTER OFF DEAD, OR OF HURTING YOURSELF: 0
8. MOVING OR SPEAKING SO SLOWLY THAT OTHER PEOPLE COULD HAVE NOTICED. OR THE OPPOSITE, BEING SO FIGETY OR RESTLESS THAT YOU HAVE BEEN MOVING AROUND A LOT MORE THAN USUAL: 0
4. FEELING TIRED OR HAVING LITTLE ENERGY: MORE THAN HALF THE DAYS
3. TROUBLE FALLING OR STAYING ASLEEP: NEARLY EVERY DAY
7. TROUBLE CONCENTRATING ON THINGS, SUCH AS READING THE NEWSPAPER OR WATCHING TELEVISION: 1
1. LITTLE INTEREST OR PLEASURE IN DOING THINGS: MORE THAN HALF THE DAYS
SUM OF ALL RESPONSES TO PHQ9 QUESTIONS 1 & 2: 3
6. FEELING BAD ABOUT YOURSELF - OR THAT YOU ARE A FAILURE OR HAVE LET YOURSELF OR YOUR FAMILY DOWN: MORE THAN HALF THE DAYS
2. FEELING DOWN, DEPRESSED OR HOPELESS: SEVERAL DAYS
SUM OF ALL RESPONSES TO PHQ QUESTIONS 1-9: 13
SUM OF ALL RESPONSES TO PHQ QUESTIONS 1-9: 13
3. TROUBLE FALLING OR STAYING ASLEEP: 3
1. LITTLE INTEREST OR PLEASURE IN DOING THINGS: 2
7. TROUBLE CONCENTRATING ON THINGS, SUCH AS READING THE NEWSPAPER OR WATCHING TELEVISION: SEVERAL DAYS
5. POOR APPETITE OR OVEREATING: MORE THAN HALF THE DAYS
9. THOUGHTS THAT YOU WOULD BE BETTER OFF DEAD, OR OF HURTING YOURSELF: NOT AT ALL
10. IF YOU CHECKED OFF ANY PROBLEMS, HOW DIFFICULT HAVE THESE PROBLEMS MADE IT FOR YOU TO DO YOUR WORK, TAKE CARE OF THINGS AT HOME, OR GET ALONG WITH OTHER PEOPLE: 1
SUM OF ALL RESPONSES TO PHQ QUESTIONS 1-9: 13
SUM OF ALL RESPONSES TO PHQ QUESTIONS 1-9: 13
8. MOVING OR SPEAKING SO SLOWLY THAT OTHER PEOPLE COULD HAVE NOTICED. OR THE OPPOSITE - BEING SO FIDGETY OR RESTLESS THAT YOU HAVE BEEN MOVING AROUND A LOT MORE THAN USUAL: NOT AT ALL
4. FEELING TIRED OR HAVING LITTLE ENERGY: 2
6. FEELING BAD ABOUT YOURSELF - OR THAT YOU ARE A FAILURE OR HAVE LET YOURSELF OR YOUR FAMILY DOWN: 2
5. POOR APPETITE OR OVEREATING: 2
2. FEELING DOWN, DEPRESSED OR HOPELESS: 1
10. IF YOU CHECKED OFF ANY PROBLEMS, HOW DIFFICULT HAVE THESE PROBLEMS MADE IT FOR YOU TO DO YOUR WORK, TAKE CARE OF THINGS AT HOME, OR GET ALONG WITH OTHER PEOPLE: SOMEWHAT DIFFICULT
SUM OF ALL RESPONSES TO PHQ QUESTIONS 1-9: 13

## 2024-01-11 ASSESSMENT — ANXIETY QUESTIONNAIRES
1. FEELING NERVOUS, ANXIOUS, OR ON EDGE: 1
3. WORRYING TOO MUCH ABOUT DIFFERENT THINGS: MORE THAN HALF THE DAYS
7. FEELING AFRAID AS IF SOMETHING AWFUL MIGHT HAPPEN: NOT AT ALL
4. TROUBLE RELAXING: 2
7. FEELING AFRAID AS IF SOMETHING AWFUL MIGHT HAPPEN: 0
5. BEING SO RESTLESS THAT IT IS HARD TO SIT STILL: SEVERAL DAYS
IF YOU CHECKED OFF ANY PROBLEMS ON THIS QUESTIONNAIRE, HOW DIFFICULT HAVE THESE PROBLEMS MADE IT FOR YOU TO DO YOUR WORK, TAKE CARE OF THINGS AT HOME, OR GET ALONG WITH OTHER PEOPLE: SOMEWHAT DIFFICULT
3. WORRYING TOO MUCH ABOUT DIFFERENT THINGS: 2
2. NOT BEING ABLE TO STOP OR CONTROL WORRYING: 3
6. BECOMING EASILY ANNOYED OR IRRITABLE: 1
2. NOT BEING ABLE TO STOP OR CONTROL WORRYING: NEARLY EVERY DAY
GAD7 TOTAL SCORE: 10
1. FEELING NERVOUS, ANXIOUS, OR ON EDGE: SEVERAL DAYS
5. BEING SO RESTLESS THAT IT IS HARD TO SIT STILL: 1
4. TROUBLE RELAXING: MORE THAN HALF THE DAYS
IF YOU CHECKED OFF ANY PROBLEMS ON THIS QUESTIONNAIRE, HOW DIFFICULT HAVE THESE PROBLEMS MADE IT FOR YOU TO DO YOUR WORK, TAKE CARE OF THINGS AT HOME, OR GET ALONG WITH OTHER PEOPLE: SOMEWHAT DIFFICULT
6. BECOMING EASILY ANNOYED OR IRRITABLE: SEVERAL DAYS

## 2024-01-11 NOTE — PROGRESS NOTES
behavior at times  Speech    spontaneous, normal rate, and normal volume  Mood    mildly depressed  Affect    depressed affect  Thought Content    intact  Thought Process    coherent and tangential  Associations    logical connections, tangential connections  Insight    Fair  Judgment    Intact  Orientation    oriented to person, place, time, and general circumstances  Memory    recent and remote memory intact  Attention/Concentration    generally intact in the appt  Morbid ideation No  Suicide Assessment    no suicidal ideation      History:    Medications:   Current Outpatient Medications   Medication Sig Dispense Refill    HUMALOG KWIKPEN 100 UNIT/ML SOPN Inject 20 Units into the skin 3 times daily (with meals)      topiramate (TOPAMAX) 25 MG tablet Take 1 tablet by mouth nightly      SUMAtriptan (IMITREX) 50 MG tablet Take 1 tablet by mouth as needed      ondansetron (ZOFRAN) 8 MG tablet Take 1 tablet by mouth every 8 hours as needed      Insulin Degludec (TRESIBA FLEXTOUCH) 200 UNIT/ML SOPN Inject 100 Units into the skin every morning      magnesium oxide (MAG-OX) 400 MG tablet Take 1 tablet by mouth daily      ascorbic acid (VITAMIN C) 500 MG tablet Take 1 tablet by mouth daily      ALPRAZolam (XANAX XR) 1 MG extended release tablet Take 1 tablet by mouth daily as needed.      amoxicillin (AMOXIL) 500 MG capsule Take 4 capsules by mouth daily as needed Prior to dental work      clotrimazole-betamethasone (LOTRISONE) 1-0.05 % cream Apply topically 2 times daily. 1 each 2    mycophenolate (CELLCEPT) 250 MG capsule Take 1 capsule by mouth 3 times daily      DULoxetine (CYMBALTA) 20 MG extended release capsule Take 1 capsule by mouth daily      Blood Glucose Monitoring Suppl (TRUE METRIX AIR GLUCOSE METER) w/Device KIT USE AS DIRECTED FOR CHECKING BLOOD SUGAR 4 TIMES DAILY.      TRUE METRIX BLOOD GLUCOSE TEST strip USE AS DIRECTED FOR CHECKING BLOOD SUGAR 4 TIMES DAILY.      TRUEplus Lancets 28G MISC USE AS

## 2024-02-07 ASSESSMENT — ANXIETY QUESTIONNAIRES
1. FEELING NERVOUS, ANXIOUS, OR ON EDGE: 1
2. NOT BEING ABLE TO STOP OR CONTROL WORRYING: 1
GAD7 TOTAL SCORE: 8
IF YOU CHECKED OFF ANY PROBLEMS ON THIS QUESTIONNAIRE, HOW DIFFICULT HAVE THESE PROBLEMS MADE IT FOR YOU TO DO YOUR WORK, TAKE CARE OF THINGS AT HOME, OR GET ALONG WITH OTHER PEOPLE: SOMEWHAT DIFFICULT
6. BECOMING EASILY ANNOYED OR IRRITABLE: 2
4. TROUBLE RELAXING: SEVERAL DAYS
7. FEELING AFRAID AS IF SOMETHING AWFUL MIGHT HAPPEN: 2
1. FEELING NERVOUS, ANXIOUS, OR ON EDGE: SEVERAL DAYS
3. WORRYING TOO MUCH ABOUT DIFFERENT THINGS: 1
6. BECOMING EASILY ANNOYED OR IRRITABLE: MORE THAN HALF THE DAYS
5. BEING SO RESTLESS THAT IT IS HARD TO SIT STILL: NOT AT ALL
7. FEELING AFRAID AS IF SOMETHING AWFUL MIGHT HAPPEN: MORE THAN HALF THE DAYS
5. BEING SO RESTLESS THAT IT IS HARD TO SIT STILL: 0
2. NOT BEING ABLE TO STOP OR CONTROL WORRYING: SEVERAL DAYS
IF YOU CHECKED OFF ANY PROBLEMS ON THIS QUESTIONNAIRE, HOW DIFFICULT HAVE THESE PROBLEMS MADE IT FOR YOU TO DO YOUR WORK, TAKE CARE OF THINGS AT HOME, OR GET ALONG WITH OTHER PEOPLE: SOMEWHAT DIFFICULT
4. TROUBLE RELAXING: 1
3. WORRYING TOO MUCH ABOUT DIFFERENT THINGS: SEVERAL DAYS

## 2024-02-07 ASSESSMENT — PATIENT HEALTH QUESTIONNAIRE - PHQ9
6. FEELING BAD ABOUT YOURSELF - OR THAT YOU ARE A FAILURE OR HAVE LET YOURSELF OR YOUR FAMILY DOWN: MORE THAN HALF THE DAYS
2. FEELING DOWN, DEPRESSED OR HOPELESS: SEVERAL DAYS
1. LITTLE INTEREST OR PLEASURE IN DOING THINGS: 1
8. MOVING OR SPEAKING SO SLOWLY THAT OTHER PEOPLE COULD HAVE NOTICED. OR THE OPPOSITE - BEING SO FIDGETY OR RESTLESS THAT YOU HAVE BEEN MOVING AROUND A LOT MORE THAN USUAL: NOT AT ALL
3. TROUBLE FALLING OR STAYING ASLEEP: NEARLY EVERY DAY
1. LITTLE INTEREST OR PLEASURE IN DOING THINGS: SEVERAL DAYS
2. FEELING DOWN, DEPRESSED OR HOPELESS: 1
10. IF YOU CHECKED OFF ANY PROBLEMS, HOW DIFFICULT HAVE THESE PROBLEMS MADE IT FOR YOU TO DO YOUR WORK, TAKE CARE OF THINGS AT HOME, OR GET ALONG WITH OTHER PEOPLE: VERY DIFFICULT
SUM OF ALL RESPONSES TO PHQ9 QUESTIONS 1 & 2: 2
8. MOVING OR SPEAKING SO SLOWLY THAT OTHER PEOPLE COULD HAVE NOTICED. OR THE OPPOSITE, BEING SO FIGETY OR RESTLESS THAT YOU HAVE BEEN MOVING AROUND A LOT MORE THAN USUAL: 0
3. TROUBLE FALLING OR STAYING ASLEEP: 3
5. POOR APPETITE OR OVEREATING: NEARLY EVERY DAY
SUM OF ALL RESPONSES TO PHQ QUESTIONS 1-9: 15
SUM OF ALL RESPONSES TO PHQ QUESTIONS 1-9: 14
SUM OF ALL RESPONSES TO PHQ QUESTIONS 1-9: 15
7. TROUBLE CONCENTRATING ON THINGS, SUCH AS READING THE NEWSPAPER OR WATCHING TELEVISION: SEVERAL DAYS
7. TROUBLE CONCENTRATING ON THINGS, SUCH AS READING THE NEWSPAPER OR WATCHING TELEVISION: 1
SUM OF ALL RESPONSES TO PHQ QUESTIONS 1-9: 15
4. FEELING TIRED OR HAVING LITTLE ENERGY: NEARLY EVERY DAY
4. FEELING TIRED OR HAVING LITTLE ENERGY: 3
5. POOR APPETITE OR OVEREATING: 3
SUM OF ALL RESPONSES TO PHQ QUESTIONS 1-9: 15
9. THOUGHTS THAT YOU WOULD BE BETTER OFF DEAD, OR OF HURTING YOURSELF: 1
10. IF YOU CHECKED OFF ANY PROBLEMS, HOW DIFFICULT HAVE THESE PROBLEMS MADE IT FOR YOU TO DO YOUR WORK, TAKE CARE OF THINGS AT HOME, OR GET ALONG WITH OTHER PEOPLE: 2
6. FEELING BAD ABOUT YOURSELF - OR THAT YOU ARE A FAILURE OR HAVE LET YOURSELF OR YOUR FAMILY DOWN: 2
9. THOUGHTS THAT YOU WOULD BE BETTER OFF DEAD, OR OF HURTING YOURSELF: SEVERAL DAYS

## 2024-02-07 ASSESSMENT — COLUMBIA-SUICIDE SEVERITY RATING SCALE - C-SSRS
2. IN THE PAST MONTH, HAVE YOU ACTUALLY HAD ANY THOUGHTS OF KILLING YOURSELF?: NO
6. IN YOUR LIFETIME, HAVE YOU EVER DONE ANYTHING, STARTED TO DO ANYTHING, OR PREPARED TO DO ANYTHING TO END YOUR LIFE?: NO
1. IN THE PAST MONTH, HAVE YOU WISHED YOU WERE DEAD OR WISHED YOU COULD GO TO SLEEP AND NOT WAKE UP?: NO

## 2024-02-08 ENCOUNTER — TELEMEDICINE (OUTPATIENT)
Dept: BEHAVIORAL/MENTAL HEALTH CLINIC | Age: 69
End: 2024-02-08
Payer: MEDICARE

## 2024-02-08 DIAGNOSIS — F33.1 MAJOR DEPRESSIVE DISORDER, RECURRENT EPISODE, MODERATE DEGREE (HCC): Primary | ICD-10-CM

## 2024-02-08 DIAGNOSIS — Z63.6 CAREGIVER STRESS: ICD-10-CM

## 2024-02-08 PROCEDURE — 1123F ACP DISCUSS/DSCN MKR DOCD: CPT | Performed by: PSYCHOLOGIST

## 2024-02-08 PROCEDURE — 90832 PSYTX W PT 30 MINUTES: CPT | Performed by: PSYCHOLOGIST

## 2024-02-08 SDOH — SOCIAL STABILITY - SOCIAL INSECURITY: DEPENDENT RELATIVE NEEDING CARE AT HOME: Z63.6
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
## 2024-02-08 NOTE — PATIENT INSTRUCTIONS
Supporting the goal/behavior change, barriers or ways other people sabotage those changes  Consider how and when you want to communicate your trip  Follow up as scheduled

## 2024-02-14 DIAGNOSIS — Z12.31 ENCOUNTER FOR SCREENING MAMMOGRAM FOR BREAST CANCER: Primary | ICD-10-CM

## 2024-02-28 ASSESSMENT — PATIENT HEALTH QUESTIONNAIRE - PHQ9
SUM OF ALL RESPONSES TO PHQ QUESTIONS 1-9: 13
2. FEELING DOWN, DEPRESSED OR HOPELESS: 1
10. IF YOU CHECKED OFF ANY PROBLEMS, HOW DIFFICULT HAVE THESE PROBLEMS MADE IT FOR YOU TO DO YOUR WORK, TAKE CARE OF THINGS AT HOME, OR GET ALONG WITH OTHER PEOPLE: SOMEWHAT DIFFICULT
7. TROUBLE CONCENTRATING ON THINGS, SUCH AS READING THE NEWSPAPER OR WATCHING TELEVISION: 1
SUM OF ALL RESPONSES TO PHQ QUESTIONS 1-9: 13
1. LITTLE INTEREST OR PLEASURE IN DOING THINGS: 2
10. IF YOU CHECKED OFF ANY PROBLEMS, HOW DIFFICULT HAVE THESE PROBLEMS MADE IT FOR YOU TO DO YOUR WORK, TAKE CARE OF THINGS AT HOME, OR GET ALONG WITH OTHER PEOPLE: 1
6. FEELING BAD ABOUT YOURSELF - OR THAT YOU ARE A FAILURE OR HAVE LET YOURSELF OR YOUR FAMILY DOWN: MORE THAN HALF THE DAYS
8. MOVING OR SPEAKING SO SLOWLY THAT OTHER PEOPLE COULD HAVE NOTICED. OR THE OPPOSITE, BEING SO FIGETY OR RESTLESS THAT YOU HAVE BEEN MOVING AROUND A LOT MORE THAN USUAL: 0
2. FEELING DOWN, DEPRESSED OR HOPELESS: SEVERAL DAYS
8. MOVING OR SPEAKING SO SLOWLY THAT OTHER PEOPLE COULD HAVE NOTICED. OR THE OPPOSITE - BEING SO FIDGETY OR RESTLESS THAT YOU HAVE BEEN MOVING AROUND A LOT MORE THAN USUAL: NOT AT ALL
SUM OF ALL RESPONSES TO PHQ QUESTIONS 1-9: 13
3. TROUBLE FALLING OR STAYING ASLEEP: NEARLY EVERY DAY
5. POOR APPETITE OR OVEREATING: 2
5. POOR APPETITE OR OVEREATING: MORE THAN HALF THE DAYS
9. THOUGHTS THAT YOU WOULD BE BETTER OFF DEAD, OR OF HURTING YOURSELF: 0
7. TROUBLE CONCENTRATING ON THINGS, SUCH AS READING THE NEWSPAPER OR WATCHING TELEVISION: SEVERAL DAYS
3. TROUBLE FALLING OR STAYING ASLEEP: 3
SUM OF ALL RESPONSES TO PHQ QUESTIONS 1-9: 13
SUM OF ALL RESPONSES TO PHQ QUESTIONS 1-9: 13
4. FEELING TIRED OR HAVING LITTLE ENERGY: MORE THAN HALF THE DAYS
6. FEELING BAD ABOUT YOURSELF - OR THAT YOU ARE A FAILURE OR HAVE LET YOURSELF OR YOUR FAMILY DOWN: 2
1. LITTLE INTEREST OR PLEASURE IN DOING THINGS: MORE THAN HALF THE DAYS
9. THOUGHTS THAT YOU WOULD BE BETTER OFF DEAD, OR OF HURTING YOURSELF: NOT AT ALL
SUM OF ALL RESPONSES TO PHQ9 QUESTIONS 1 & 2: 3

## 2024-02-28 ASSESSMENT — ANXIETY QUESTIONNAIRES
5. BEING SO RESTLESS THAT IT IS HARD TO SIT STILL: NOT AT ALL
7. FEELING AFRAID AS IF SOMETHING AWFUL MIGHT HAPPEN: SEVERAL DAYS
6. BECOMING EASILY ANNOYED OR IRRITABLE: SEVERAL DAYS
6. BECOMING EASILY ANNOYED OR IRRITABLE: 1
IF YOU CHECKED OFF ANY PROBLEMS ON THIS QUESTIONNAIRE, HOW DIFFICULT HAVE THESE PROBLEMS MADE IT FOR YOU TO DO YOUR WORK, TAKE CARE OF THINGS AT HOME, OR GET ALONG WITH OTHER PEOPLE: SOMEWHAT DIFFICULT
GAD7 TOTAL SCORE: 6
2. NOT BEING ABLE TO STOP OR CONTROL WORRYING: 1
2. NOT BEING ABLE TO STOP OR CONTROL WORRYING: SEVERAL DAYS
IF YOU CHECKED OFF ANY PROBLEMS ON THIS QUESTIONNAIRE, HOW DIFFICULT HAVE THESE PROBLEMS MADE IT FOR YOU TO DO YOUR WORK, TAKE CARE OF THINGS AT HOME, OR GET ALONG WITH OTHER PEOPLE: SOMEWHAT DIFFICULT
3. WORRYING TOO MUCH ABOUT DIFFERENT THINGS: SEVERAL DAYS
3. WORRYING TOO MUCH ABOUT DIFFERENT THINGS: 1
4. TROUBLE RELAXING: 1
7. FEELING AFRAID AS IF SOMETHING AWFUL MIGHT HAPPEN: 1
1. FEELING NERVOUS, ANXIOUS, OR ON EDGE: 1
1. FEELING NERVOUS, ANXIOUS, OR ON EDGE: SEVERAL DAYS
5. BEING SO RESTLESS THAT IT IS HARD TO SIT STILL: 0
4. TROUBLE RELAXING: SEVERAL DAYS

## 2024-02-29 ENCOUNTER — TELEMEDICINE (OUTPATIENT)
Dept: BEHAVIORAL/MENTAL HEALTH CLINIC | Age: 69
End: 2024-02-29
Payer: MEDICARE

## 2024-02-29 DIAGNOSIS — F33.1 MAJOR DEPRESSIVE DISORDER, RECURRENT EPISODE, MODERATE DEGREE (HCC): Primary | ICD-10-CM

## 2024-02-29 DIAGNOSIS — Z63.6 CAREGIVER STRESS: ICD-10-CM

## 2024-02-29 PROCEDURE — 1123F ACP DISCUSS/DSCN MKR DOCD: CPT | Performed by: PSYCHOLOGIST

## 2024-02-29 PROCEDURE — 90832 PSYTX W PT 30 MINUTES: CPT | Performed by: PSYCHOLOGIST

## 2024-02-29 SDOH — SOCIAL STABILITY - SOCIAL INSECURITY: DEPENDENT RELATIVE NEEDING CARE AT HOME: Z63.6
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
## 2024-02-29 NOTE — PATIENT INSTRUCTIONS
Consider those points about how he is unlikely to quit drinking for good- continue to adjust accordingly!  That defensive approach is likely the best to avoid the risk of this situation  Follow up as scheduled

## 2024-03-20 ENCOUNTER — HOSPITAL ENCOUNTER (OUTPATIENT)
Dept: WOMENS IMAGING | Age: 69
Discharge: HOME | End: 2024-03-22
Attending: SURGERY
Payer: MEDICARE

## 2024-03-20 DIAGNOSIS — Z12.31 ENCOUNTER FOR SCREENING MAMMOGRAM FOR BREAST CANCER: ICD-10-CM

## 2024-03-20 PROCEDURE — 77063 BREAST TOMOSYNTHESIS BI: CPT

## 2024-03-27 ASSESSMENT — PATIENT HEALTH QUESTIONNAIRE - PHQ9
10. IF YOU CHECKED OFF ANY PROBLEMS, HOW DIFFICULT HAVE THESE PROBLEMS MADE IT FOR YOU TO DO YOUR WORK, TAKE CARE OF THINGS AT HOME, OR GET ALONG WITH OTHER PEOPLE: EXTREMELY DIFFICULT
1. LITTLE INTEREST OR PLEASURE IN DOING THINGS: NEARLY EVERY DAY
SUM OF ALL RESPONSES TO PHQ QUESTIONS 1-9: 23
5. POOR APPETITE OR OVEREATING: NEARLY EVERY DAY
SUM OF ALL RESPONSES TO PHQ QUESTIONS 1-9: 23
9. THOUGHTS THAT YOU WOULD BE BETTER OFF DEAD, OR OF HURTING YOURSELF: NOT AT ALL
8. MOVING OR SPEAKING SO SLOWLY THAT OTHER PEOPLE COULD HAVE NOTICED. OR THE OPPOSITE, BEING SO FIGETY OR RESTLESS THAT YOU HAVE BEEN MOVING AROUND A LOT MORE THAN USUAL: NEARLY EVERY DAY
7. TROUBLE CONCENTRATING ON THINGS, SUCH AS READING THE NEWSPAPER OR WATCHING TELEVISION: NEARLY EVERY DAY
10. IF YOU CHECKED OFF ANY PROBLEMS, HOW DIFFICULT HAVE THESE PROBLEMS MADE IT FOR YOU TO DO YOUR WORK, TAKE CARE OF THINGS AT HOME, OR GET ALONG WITH OTHER PEOPLE: EXTREMELY DIFFICULT
3. TROUBLE FALLING OR STAYING ASLEEP: NEARLY EVERY DAY
6. FEELING BAD ABOUT YOURSELF - OR THAT YOU ARE A FAILURE OR HAVE LET YOURSELF OR YOUR FAMILY DOWN: MORE THAN HALF THE DAYS
4. FEELING TIRED OR HAVING LITTLE ENERGY: NEARLY EVERY DAY
2. FEELING DOWN, DEPRESSED OR HOPELESS: NEARLY EVERY DAY
SUM OF ALL RESPONSES TO PHQ QUESTIONS 1-9: 23
SUM OF ALL RESPONSES TO PHQ QUESTIONS 1-9: 23
6. FEELING BAD ABOUT YOURSELF - OR THAT YOU ARE A FAILURE OR HAVE LET YOURSELF OR YOUR FAMILY DOWN: MORE THAN HALF THE DAYS
8. MOVING OR SPEAKING SO SLOWLY THAT OTHER PEOPLE COULD HAVE NOTICED. OR THE OPPOSITE - BEING SO FIDGETY OR RESTLESS THAT YOU HAVE BEEN MOVING AROUND A LOT MORE THAN USUAL: NEARLY EVERY DAY
5. POOR APPETITE OR OVEREATING: NEARLY EVERY DAY
4. FEELING TIRED OR HAVING LITTLE ENERGY: NEARLY EVERY DAY
SUM OF ALL RESPONSES TO PHQ9 QUESTIONS 1 & 2: 6
2. FEELING DOWN, DEPRESSED OR HOPELESS: NEARLY EVERY DAY
9. THOUGHTS THAT YOU WOULD BE BETTER OFF DEAD, OR OF HURTING YOURSELF: NOT AT ALL
1. LITTLE INTEREST OR PLEASURE IN DOING THINGS: NEARLY EVERY DAY
SUM OF ALL RESPONSES TO PHQ QUESTIONS 1-9: 23
3. TROUBLE FALLING OR STAYING ASLEEP: NEARLY EVERY DAY
7. TROUBLE CONCENTRATING ON THINGS, SUCH AS READING THE NEWSPAPER OR WATCHING TELEVISION: NEARLY EVERY DAY

## 2024-03-27 ASSESSMENT — ANXIETY QUESTIONNAIRES
IF YOU CHECKED OFF ANY PROBLEMS ON THIS QUESTIONNAIRE, HOW DIFFICULT HAVE THESE PROBLEMS MADE IT FOR YOU TO DO YOUR WORK, TAKE CARE OF THINGS AT HOME, OR GET ALONG WITH OTHER PEOPLE: VERY DIFFICULT
5. BEING SO RESTLESS THAT IT IS HARD TO SIT STILL: NOT AT ALL
3. WORRYING TOO MUCH ABOUT DIFFERENT THINGS: MORE THAN HALF THE DAYS
6. BECOMING EASILY ANNOYED OR IRRITABLE: SEVERAL DAYS
4. TROUBLE RELAXING: MORE THAN HALF THE DAYS
7. FEELING AFRAID AS IF SOMETHING AWFUL MIGHT HAPPEN: MORE THAN HALF THE DAYS
IF YOU CHECKED OFF ANY PROBLEMS ON THIS QUESTIONNAIRE, HOW DIFFICULT HAVE THESE PROBLEMS MADE IT FOR YOU TO DO YOUR WORK, TAKE CARE OF THINGS AT HOME, OR GET ALONG WITH OTHER PEOPLE: VERY DIFFICULT
3. WORRYING TOO MUCH ABOUT DIFFERENT THINGS: MORE THAN HALF THE DAYS
7. FEELING AFRAID AS IF SOMETHING AWFUL MIGHT HAPPEN: MORE THAN HALF THE DAYS
1. FEELING NERVOUS, ANXIOUS, OR ON EDGE: SEVERAL DAYS
GAD7 TOTAL SCORE: 9
5. BEING SO RESTLESS THAT IT IS HARD TO SIT STILL: NOT AT ALL
4. TROUBLE RELAXING: MORE THAN HALF THE DAYS
2. NOT BEING ABLE TO STOP OR CONTROL WORRYING: SEVERAL DAYS
6. BECOMING EASILY ANNOYED OR IRRITABLE: SEVERAL DAYS
1. FEELING NERVOUS, ANXIOUS, OR ON EDGE: SEVERAL DAYS
2. NOT BEING ABLE TO STOP OR CONTROL WORRYING: SEVERAL DAYS

## 2024-03-27 ASSESSMENT — COLUMBIA-SUICIDE SEVERITY RATING SCALE - C-SSRS
1. IN THE PAST MONTH, HAVE YOU WISHED YOU WERE DEAD OR WISHED YOU COULD GO TO SLEEP AND NOT WAKE UP?: NO
2. IN THE PAST MONTH, HAVE YOU ACTUALLY HAD ANY THOUGHTS OF KILLING YOURSELF?: NO
6. IN YOUR LIFETIME, HAVE YOU EVER DONE ANYTHING, STARTED TO DO ANYTHING, OR PREPARED TO DO ANYTHING TO END YOUR LIFE?: NO

## 2024-03-28 ENCOUNTER — TELEMEDICINE (OUTPATIENT)
Dept: BEHAVIORAL/MENTAL HEALTH CLINIC | Age: 69
End: 2024-03-28
Payer: MEDICARE

## 2024-03-28 DIAGNOSIS — F33.1 MAJOR DEPRESSIVE DISORDER, RECURRENT EPISODE, MODERATE DEGREE (HCC): Primary | ICD-10-CM

## 2024-03-28 DIAGNOSIS — Z63.6 CAREGIVER STRESS: ICD-10-CM

## 2024-03-28 PROCEDURE — 1123F ACP DISCUSS/DSCN MKR DOCD: CPT | Performed by: PSYCHOLOGIST

## 2024-03-28 PROCEDURE — 90832 PSYTX W PT 30 MINUTES: CPT | Performed by: PSYCHOLOGIST

## 2024-03-28 SDOH — SOCIAL STABILITY - SOCIAL INSECURITY: DEPENDENT RELATIVE NEEDING CARE AT HOME: Z63.6

## 2024-03-28 NOTE — PATIENT INSTRUCTIONS
Consider some of that creative problem solving discussed  Enjoy your trip!  Follow up as scheduled

## 2024-03-28 NOTE — PROGRESS NOTES
recognition software  And may cause contain errors related to that system including grammar, punctuation and spelling as well as words and phrases that may seem inappropriate. If there are questions or concerns please feel free to contact me to clarify.    Please be aware, due to [facility] policy and regulatory standards, our practice requires a minimum six-month patient-physician relationship before processing disability or other leave paperwork. This ensures thorough evaluation and adherence to medical documentation standards.

## 2024-05-13 ENCOUNTER — TELEMEDICINE (OUTPATIENT)
Dept: BEHAVIORAL/MENTAL HEALTH CLINIC | Age: 69
End: 2024-05-13
Payer: MEDICARE

## 2024-05-13 DIAGNOSIS — Z63.6 CAREGIVER STRESS: ICD-10-CM

## 2024-05-13 DIAGNOSIS — F33.1 MAJOR DEPRESSIVE DISORDER, RECURRENT EPISODE, MODERATE DEGREE (HCC): Primary | ICD-10-CM

## 2024-05-13 PROCEDURE — 1123F ACP DISCUSS/DSCN MKR DOCD: CPT | Performed by: PSYCHOLOGIST

## 2024-05-13 PROCEDURE — 90832 PSYTX W PT 30 MINUTES: CPT | Performed by: PSYCHOLOGIST

## 2024-05-13 SDOH — SOCIAL STABILITY - SOCIAL INSECURITY: DEPENDENT RELATIVE NEEDING CARE AT HOME: Z63.6

## 2024-05-13 ASSESSMENT — PATIENT HEALTH QUESTIONNAIRE - PHQ9
SUM OF ALL RESPONSES TO PHQ QUESTIONS 1-9: 21
SUM OF ALL RESPONSES TO PHQ QUESTIONS 1-9: 21
1. LITTLE INTEREST OR PLEASURE IN DOING THINGS: NEARLY EVERY DAY
4. FEELING TIRED OR HAVING LITTLE ENERGY: NEARLY EVERY DAY
4. FEELING TIRED OR HAVING LITTLE ENERGY: NEARLY EVERY DAY
SUM OF ALL RESPONSES TO PHQ QUESTIONS 1-9: 21
SUM OF ALL RESPONSES TO PHQ9 QUESTIONS 1 & 2: 6
7. TROUBLE CONCENTRATING ON THINGS, SUCH AS READING THE NEWSPAPER OR WATCHING TELEVISION: NEARLY EVERY DAY
2. FEELING DOWN, DEPRESSED OR HOPELESS: NEARLY EVERY DAY
SUM OF ALL RESPONSES TO PHQ QUESTIONS 1-9: 21
6. FEELING BAD ABOUT YOURSELF - OR THAT YOU ARE A FAILURE OR HAVE LET YOURSELF OR YOUR FAMILY DOWN: NOT AT ALL
7. TROUBLE CONCENTRATING ON THINGS, SUCH AS READING THE NEWSPAPER OR WATCHING TELEVISION: NEARLY EVERY DAY
10. IF YOU CHECKED OFF ANY PROBLEMS, HOW DIFFICULT HAVE THESE PROBLEMS MADE IT FOR YOU TO DO YOUR WORK, TAKE CARE OF THINGS AT HOME, OR GET ALONG WITH OTHER PEOPLE: SOMEWHAT DIFFICULT
1. LITTLE INTEREST OR PLEASURE IN DOING THINGS: NEARLY EVERY DAY
5. POOR APPETITE OR OVEREATING: NEARLY EVERY DAY
8. MOVING OR SPEAKING SO SLOWLY THAT OTHER PEOPLE COULD HAVE NOTICED. OR THE OPPOSITE - BEING SO FIDGETY OR RESTLESS THAT YOU HAVE BEEN MOVING AROUND A LOT MORE THAN USUAL: NEARLY EVERY DAY
9. THOUGHTS THAT YOU WOULD BE BETTER OFF DEAD, OR OF HURTING YOURSELF: NOT AT ALL
9. THOUGHTS THAT YOU WOULD BE BETTER OFF DEAD, OR OF HURTING YOURSELF: NOT AT ALL
2. FEELING DOWN, DEPRESSED OR HOPELESS: NEARLY EVERY DAY
SUM OF ALL RESPONSES TO PHQ QUESTIONS 1-9: 21
8. MOVING OR SPEAKING SO SLOWLY THAT OTHER PEOPLE COULD HAVE NOTICED. OR THE OPPOSITE, BEING SO FIGETY OR RESTLESS THAT YOU HAVE BEEN MOVING AROUND A LOT MORE THAN USUAL: NEARLY EVERY DAY
5. POOR APPETITE OR OVEREATING: NEARLY EVERY DAY
10. IF YOU CHECKED OFF ANY PROBLEMS, HOW DIFFICULT HAVE THESE PROBLEMS MADE IT FOR YOU TO DO YOUR WORK, TAKE CARE OF THINGS AT HOME, OR GET ALONG WITH OTHER PEOPLE: SOMEWHAT DIFFICULT
6. FEELING BAD ABOUT YOURSELF - OR THAT YOU ARE A FAILURE OR HAVE LET YOURSELF OR YOUR FAMILY DOWN: NOT AT ALL
3. TROUBLE FALLING OR STAYING ASLEEP: NEARLY EVERY DAY
3. TROUBLE FALLING OR STAYING ASLEEP: NEARLY EVERY DAY

## 2024-05-13 ASSESSMENT — COLUMBIA-SUICIDE SEVERITY RATING SCALE - C-SSRS
2. IN THE PAST MONTH, HAVE YOU ACTUALLY HAD ANY THOUGHTS OF KILLING YOURSELF?: NO
1. IN THE PAST MONTH, HAVE YOU WISHED YOU WERE DEAD OR WISHED YOU COULD GO TO SLEEP AND NOT WAKE UP?: NO
6. IN YOUR LIFETIME, HAVE YOU EVER DONE ANYTHING, STARTED TO DO ANYTHING, OR PREPARED TO DO ANYTHING TO END YOUR LIFE?: NO

## 2024-05-13 ASSESSMENT — ANXIETY QUESTIONNAIRES
1. FEELING NERVOUS, ANXIOUS, OR ON EDGE: NOT AT ALL
5. BEING SO RESTLESS THAT IT IS HARD TO SIT STILL: NOT AT ALL
3. WORRYING TOO MUCH ABOUT DIFFERENT THINGS: SEVERAL DAYS
2. NOT BEING ABLE TO STOP OR CONTROL WORRYING: SEVERAL DAYS
IF YOU CHECKED OFF ANY PROBLEMS ON THIS QUESTIONNAIRE, HOW DIFFICULT HAVE THESE PROBLEMS MADE IT FOR YOU TO DO YOUR WORK, TAKE CARE OF THINGS AT HOME, OR GET ALONG WITH OTHER PEOPLE: SOMEWHAT DIFFICULT
GAD7 TOTAL SCORE: 3
7. FEELING AFRAID AS IF SOMETHING AWFUL MIGHT HAPPEN: NOT AT ALL
6. BECOMING EASILY ANNOYED OR IRRITABLE: NOT AT ALL
4. TROUBLE RELAXING: SEVERAL DAYS
3. WORRYING TOO MUCH ABOUT DIFFERENT THINGS: SEVERAL DAYS
IF YOU CHECKED OFF ANY PROBLEMS ON THIS QUESTIONNAIRE, HOW DIFFICULT HAVE THESE PROBLEMS MADE IT FOR YOU TO DO YOUR WORK, TAKE CARE OF THINGS AT HOME, OR GET ALONG WITH OTHER PEOPLE: SOMEWHAT DIFFICULT
2. NOT BEING ABLE TO STOP OR CONTROL WORRYING: SEVERAL DAYS
5. BEING SO RESTLESS THAT IT IS HARD TO SIT STILL: NOT AT ALL
1. FEELING NERVOUS, ANXIOUS, OR ON EDGE: NOT AT ALL
7. FEELING AFRAID AS IF SOMETHING AWFUL MIGHT HAPPEN: NOT AT ALL
6. BECOMING EASILY ANNOYED OR IRRITABLE: NOT AT ALL
4. TROUBLE RELAXING: SEVERAL DAYS

## 2024-05-13 NOTE — PROGRESS NOTES
Behavioral Health Consultation  Key Delvalle, Ph.D., [facility]  Psychologist  5/13/24  1:55 PM EDT      Time spent with Patient: 30 minutes  This is patient's  11th   [facility] appointment.    Reason for Consult:  depression, anxiety, and stress  Referring Provider: Angela Mesa, DO    TELEHEALTH EVALUATION -- Audio with Visual     Pt requested a virtual visit through a [employer] Video Visit. Patient reports that they are located at home. Provider Location is at her office in [place].    Patient (and/or legal guardian) also understood that insurance coverage and co-pays are up to their individual insurance plans. Patient (and/or legal guardian) provides verbal consent for this visit to be billed to insurance.     Services were provided through a video synchronous discussion virtually to substitute for in-person clinic visit.    Orquidea BAILON Cleary, was evaluated through a synchronous (real-time) audio-video encounter. The patient (or guardian if applicable) is aware that this is a billable service, which includes applicable co-pays. This Virtual Visit was conducted with patient's (and/or legal guardian's) consent. Patient identification was verified, and a caregiver was present when appropriate.   The patient was located at Home: [address] [facility] [address]  Provider was located at Facility [facility] [address] [place] [address] [place] [address]  Confirm you are appropriately licensed, registered, or certified to deliver care in the state where the patient is located as indicated above. If you are not or unsure, please re-schedule the visit: Yes, I confirm.          --Key Delvalle, PhD on 5/13/2024 at 1:56 PM    An electronic signature was used to authenticate this note.    Feedback given to PCP.    S:    Pt notes impact of feeling \"run down\" due to a head cold. Pt notes her trip went \"very well\", was a blessing to have a room to herself. Pt notes she came back \"thoroughly relaxed\". Pt notes benefit of massage- \"it

## 2024-06-10 ENCOUNTER — OFFICE VISIT (OUTPATIENT)
Dept: RADIATION ONCOLOGY | Age: 69
End: 2024-06-10
Payer: MEDICARE

## 2024-06-10 VITALS
SYSTOLIC BLOOD PRESSURE: 107 MMHG | OXYGEN SATURATION: 98 % | DIASTOLIC BLOOD PRESSURE: 61 MMHG | WEIGHT: 225 LBS | HEIGHT: 70 IN | TEMPERATURE: 97.7 F | BODY MASS INDEX: 32.21 KG/M2 | HEART RATE: 94 BPM | RESPIRATION RATE: 16 BRPM

## 2024-06-10 DIAGNOSIS — Z12.31 BREAST CANCER SCREENING BY MAMMOGRAM: ICD-10-CM

## 2024-06-10 DIAGNOSIS — Z08 ENCOUNTER FOR FOLLOW-UP SURVEILLANCE OF BREAST CANCER: Primary | ICD-10-CM

## 2024-06-10 DIAGNOSIS — Z85.3 ENCOUNTER FOR FOLLOW-UP SURVEILLANCE OF BREAST CANCER: Primary | ICD-10-CM

## 2024-06-10 PROCEDURE — 1036F TOBACCO NON-USER: CPT | Performed by: NURSE PRACTITIONER

## 2024-06-10 PROCEDURE — 99213 OFFICE O/P EST LOW 20 MIN: CPT | Performed by: NURSE PRACTITIONER

## 2024-06-10 PROCEDURE — 3078F DIAST BP <80 MM HG: CPT | Performed by: NURSE PRACTITIONER

## 2024-06-10 PROCEDURE — G8417 CALC BMI ABV UP PARAM F/U: HCPCS | Performed by: NURSE PRACTITIONER

## 2024-06-10 PROCEDURE — 3074F SYST BP LT 130 MM HG: CPT | Performed by: NURSE PRACTITIONER

## 2024-06-10 PROCEDURE — G8427 DOCREV CUR MEDS BY ELIG CLIN: HCPCS | Performed by: NURSE PRACTITIONER

## 2024-06-10 PROCEDURE — 3017F COLORECTAL CA SCREEN DOC REV: CPT | Performed by: NURSE PRACTITIONER

## 2024-06-10 PROCEDURE — 1090F PRES/ABSN URINE INCON ASSESS: CPT | Performed by: NURSE PRACTITIONER

## 2024-06-10 PROCEDURE — G8399 PT W/DXA RESULTS DOCUMENT: HCPCS | Performed by: NURSE PRACTITIONER

## 2024-06-10 PROCEDURE — 1123F ACP DISCUSS/DSCN MKR DOCD: CPT | Performed by: NURSE PRACTITIONER

## 2024-06-10 RX ORDER — DAPAGLIFLOZIN 5 MG/1
5 TABLET, FILM COATED ORAL
COMMUNITY
Start: 2023-02-27

## 2024-06-10 RX ORDER — LIDOCAINE 50 MG/G
1 PATCH TOPICAL DAILY
COMMUNITY
Start: 2023-07-21

## 2024-06-10 RX ORDER — POTASSIUM CITRATE 10 MEQ/1
10 TABLET, EXTENDED RELEASE ORAL 2 TIMES DAILY WITH MEALS
COMMUNITY

## 2024-06-10 RX ORDER — LOSARTAN POTASSIUM 50 MG/1
50 TABLET ORAL 2 TIMES DAILY
COMMUNITY

## 2024-06-10 ASSESSMENT — ENCOUNTER SYMPTOMS
SHORTNESS OF BREATH: 0
ABDOMINAL PAIN: 0
TROUBLE SWALLOWING: 0
FACIAL SWELLING: 0
COUGH: 0

## 2024-06-10 NOTE — PROGRESS NOTES
(VIBRA-TABS) 100 MG tablet, Take 1 tablet by mouth daily as needed (Patient not taking: Reported on 6/10/2024), Disp: , Rfl:      REVIEW OF SYSTEMS:   Review of Systems   Constitutional:  Negative for chills and fever.   HENT:  Negative for facial swelling and trouble swallowing.    Eyes:  Negative for visual disturbance.   Respiratory:  Negative for cough and shortness of breath.    Cardiovascular:  Negative for chest pain and leg swelling.   Gastrointestinal:  Negative for abdominal pain.   Musculoskeletal:  Negative for gait problem.   Skin:  Negative for rash and wound.   Allergic/Immunologic: Positive for immunocompromised state.   Neurological:  Negative for tremors, speech difficulty and headaches.   Psychiatric/Behavioral:  Negative for agitation and confusion.         PHYSICAL EXAM:  /61 (Site: Right Upper Arm, Position: Sitting, Cuff Size: Medium Adult)   Pulse 94   Temp 97.7 °F (36.5 °C)   Resp 16   Ht 1.778 m (5' 10\")   Wt 102.1 kg (225 lb)   SpO2 98%   BMI 32.28 kg/m²   General Appearance: Well developed, well nourished, appears stated age, in no acute distress  Neurological: Alert and oriented to person, place, and time. Normal speech. Normal gait and balance  Psychiatric: Normal mood and affect. Attention span and thought content normal.   HEENT: Normocephalic, atraumatic. Clear conjunctiva and sclera non-icteric  Neck: Supple, symmetrical, trachea midline, no cervical/supraclavicular adenopathy  CV: Regular rate and rhythm  Pulmonary: Normal respiratory rate and effort. No respiratory distress  Musculoskeletal: Normal range of motion in upper and lower extremities  Extremities: No significant peripheral edema  Skin: Skin is warm and dry. No rashes or lesions  GI/Abdomen: Soft, non-tender. No masses or organomegaly  Breasts: Breasts are symmetrical  Right Breast: The skin, nipple, and areola appear normal. There is no skin dimpling with movement of the pectoralis. There is no nipple

## 2024-10-01 ENCOUNTER — OFFICE VISIT (OUTPATIENT)
Dept: URGENT CARE | Age: 69
End: 2024-10-01
Payer: MEDICARE

## 2024-10-01 VITALS
DIASTOLIC BLOOD PRESSURE: 88 MMHG | TEMPERATURE: 97.7 F | BODY MASS INDEX: 31.5 KG/M2 | SYSTOLIC BLOOD PRESSURE: 121 MMHG | HEIGHT: 70 IN | RESPIRATION RATE: 20 BRPM | HEART RATE: 95 BPM | WEIGHT: 220 LBS | OXYGEN SATURATION: 94 %

## 2024-10-01 DIAGNOSIS — S69.91XA INJURY OF FINGER OF RIGHT HAND, INITIAL ENCOUNTER: Primary | ICD-10-CM

## 2024-10-01 RX ORDER — CLINDAMYCIN AND BENZOYL PEROXIDE 10; 50 MG/G; MG/G
GEL TOPICAL
COMMUNITY
Start: 2023-11-01

## 2024-10-01 RX ORDER — INSULIN DEGLUDEC 200 U/ML
100 INJECTION, SOLUTION SUBCUTANEOUS
COMMUNITY
Start: 2023-07-26

## 2024-10-01 RX ORDER — PRAVASTATIN SODIUM 10 MG/1
TABLET ORAL
COMMUNITY
Start: 2024-03-08

## 2024-10-01 RX ORDER — OMEPRAZOLE 20 MG/1
20 CAPSULE, DELAYED RELEASE ORAL
COMMUNITY
Start: 2023-09-05

## 2024-10-01 RX ORDER — ESCITALOPRAM OXALATE 20 MG/1
TABLET ORAL
COMMUNITY
Start: 2024-03-04

## 2024-10-01 RX ORDER — TACROLIMUS 0.5 MG/1
CAPSULE ORAL
COMMUNITY

## 2024-10-01 RX ORDER — AMOXICILLIN AND CLAVULANATE POTASSIUM 875; 125 MG/1; MG/1
875 TABLET, FILM COATED ORAL 2 TIMES DAILY
COMMUNITY
Start: 2024-09-23 | End: 2024-10-03

## 2024-10-01 RX ORDER — INSULIN LISPRO 100 [IU]/ML
INJECTION, SOLUTION INTRAVENOUS; SUBCUTANEOUS
COMMUNITY
Start: 2023-02-22

## 2024-10-01 RX ORDER — POTASSIUM CITRATE 10 MEQ/1
1 TABLET, EXTENDED RELEASE ORAL
COMMUNITY
Start: 2023-12-05 | End: 2024-12-04

## 2024-10-01 RX ORDER — ONDANSETRON HYDROCHLORIDE 8 MG/1
8 TABLET, FILM COATED ORAL EVERY 8 HOURS PRN
COMMUNITY
Start: 2024-09-16

## 2024-10-01 RX ORDER — MYCOPHENOLATE MOFETIL 250 MG/1
750 CAPSULE ORAL 2 TIMES DAILY
COMMUNITY
Start: 2024-03-05

## 2024-10-01 RX ORDER — LOSARTAN POTASSIUM 25 MG/1
25 TABLET ORAL 2 TIMES DAILY
COMMUNITY
Start: 2024-09-23 | End: 2025-09-23

## 2024-10-01 RX ORDER — ACETAMINOPHEN 500 MG
500-1000 TABLET ORAL EVERY 8 HOURS PRN
COMMUNITY
Start: 2024-08-26

## 2024-10-01 RX ORDER — ACETAMINOPHEN 500 MG
1000 TABLET ORAL ONCE
Status: COMPLETED | OUTPATIENT
Start: 2024-10-01 | End: 2024-10-01

## 2024-10-01 ASSESSMENT — ENCOUNTER SYMPTOMS
NEUROLOGICAL NEGATIVE: 1
PALPITATIONS: 0
CONSTITUTIONAL NEGATIVE: 1
EYES NEGATIVE: 1
MUSCULOSKELETAL NEGATIVE: 1
HEMATOLOGIC/LYMPHATIC NEGATIVE: 1
ALLERGIC/IMMUNOLOGIC NEGATIVE: 1
RESPIRATORY NEGATIVE: 1
PSYCHIATRIC NEGATIVE: 1
GASTROINTESTINAL NEGATIVE: 1
ENDOCRINE NEGATIVE: 1

## 2024-10-01 ASSESSMENT — PAIN SCALES - GENERAL: PAINLEVEL: 8

## 2024-10-01 NOTE — PROGRESS NOTES
"Subjective   Patient ID: Stephanie Root is a 69 y.o. female. They present today with a chief complaint of Hand Injury (Fell on 9/29/24. Swelling and bruising on rt hand 4th digit. Pressure/sharp type pain. Prior surg to rt forearm).    History of Present Illness    Hand Injury    Pt presents to urgent care with c/o  R 4th finger pain s/p injury yesterday.  Pt states she tripped and accidentally hit her hand on the stair railing.  States she is concerned something may be broken and would like an xray. She denies numbness, tingling, decreased rom. Pt denies CP, SOB, palpitations, fevers, abd pain, n/v/d, sick contacts, recent travel.        Past Medical History  Allergies as of 10/01/2024 - Reviewed 10/01/2024   Allergen Reaction Noted    Metformin GI Upset 09/10/2012    Nsaids (non-steroidal anti-inflammatory drug) Other 08/24/2017       (Not in a hospital admission)       No past medical history on file.    Past Surgical History:   Procedure Laterality Date    FOREARM FRACTURE SURGERY Right         reports that she quit smoking about 40 years ago. Her smoking use included cigarettes. She does not have any smokeless tobacco history on file.    Review of Systems  Review of Systems   Constitutional: Negative.    HENT: Negative.     Eyes: Negative.    Respiratory: Negative.     Cardiovascular:  Negative for chest pain and palpitations.   Gastrointestinal: Negative.    Endocrine: Negative.    Genitourinary: Negative.    Musculoskeletal: Negative.    Skin: Negative.    Allergic/Immunologic: Negative.    Neurological: Negative.    Hematological: Negative.    Psychiatric/Behavioral: Negative.     All other systems reviewed and are negative.                                 Objective    Vitals:    10/01/24 1208   BP: 121/88   BP Location: Right arm   Patient Position: Sitting   BP Cuff Size: Adult   Pulse: 95   Resp: 20   Temp: 36.5 °C (97.7 °F)   TempSrc: Oral   SpO2: 94%   Weight: 99.8 kg (220 lb)   Height: 1.778 m (5' 10\") "     No LMP recorded. Patient is postmenopausal.    Physical Exam  Vitals and nursing note reviewed.   Constitutional:       General: She is not in acute distress.     Appearance: Normal appearance. She is not ill-appearing or toxic-appearing.   HENT:      Head: Atraumatic.      Right Ear: Tympanic membrane, ear canal and external ear normal.      Left Ear: Tympanic membrane, ear canal and external ear normal.      Nose: Nose normal.      Mouth/Throat:      Mouth: Mucous membranes are moist.      Pharynx: Oropharynx is clear. No oropharyngeal exudate or posterior oropharyngeal erythema.   Eyes:      Extraocular Movements: Extraocular movements intact.      Conjunctiva/sclera: Conjunctivae normal.      Pupils: Pupils are equal, round, and reactive to light.   Cardiovascular:      Rate and Rhythm: Normal rate and regular rhythm.      Pulses: Normal pulses.      Heart sounds: Normal heart sounds.   Pulmonary:      Effort: Pulmonary effort is normal.      Breath sounds: Normal breath sounds.   Abdominal:      General: Abdomen is flat. Bowel sounds are normal.      Palpations: Abdomen is soft.      Tenderness: There is no abdominal tenderness.   Musculoskeletal:         General: Normal range of motion.      Right hand: Tenderness present. Normal range of motion. Normal strength. Normal sensation. There is no disruption of two-point discrimination. Normal capillary refill. Normal pulse.        Hands:       Cervical back: Normal range of motion and neck supple. No tenderness.   Skin:     General: Skin is warm and dry.      Capillary Refill: Capillary refill takes less than 2 seconds.   Neurological:      General: No focal deficit present.      Mental Status: She is alert and oriented to person, place, and time.   Psychiatric:         Mood and Affect: Mood normal.         Behavior: Behavior normal.         Thought Content: Thought content normal.         Procedures    Point of Care Test & Imaging Results from this visit  No  results found for this visit on 10/01/24.   No results found.    Diagnostic study results (if any) were reviewed by [facility].    Assessment/Plan   Allergies, medications, history, and pertinent labs/EKGs/Imaging reviewed by FLORIN Narayan.     Medical Decision Making  Pt presents with R 4th finger injury, pain and bruising.  MSPs intact.  fingers are warm to touch with brisk cap refill.  Intact pulses.  ROM intact.   Pt given PO Tylenol in urgent care today.  Xrays of hand ordered but pt left prior to having xrays completed.     Orders and Diagnoses  Diagnoses and all orders for this visit:  Injury of finger of right hand, initial encounter  -     acetaminophen (Tylenol) tablet 1,000 mg      Medical Admin Record  Administrations This Visit       acetaminophen (Tylenol) tablet 1,000 mg       Admin Date  10/01/2024 Action  Given Dose  1,000 mg Route  oral Documented By  Enedelia Ramos MA                    Patient disposition: Home    Electronically signed by [facility]  12:45 PM

## 2025-03-26 ENCOUNTER — OFFICE VISIT (OUTPATIENT)
Age: 70
End: 2025-03-26
Payer: MEDICARE

## 2025-03-26 VITALS
BODY MASS INDEX: 30.78 KG/M2 | OXYGEN SATURATION: 98 % | WEIGHT: 215 LBS | HEIGHT: 70 IN | HEART RATE: 94 BPM | TEMPERATURE: 97.1 F

## 2025-03-26 DIAGNOSIS — M20.42 HAMMER TOES OF BOTH FEET: ICD-10-CM

## 2025-03-26 DIAGNOSIS — M21.621 TAILOR'S BUNIONETTE, BILATERAL: ICD-10-CM

## 2025-03-26 DIAGNOSIS — M20.41 HAMMER TOES OF BOTH FEET: ICD-10-CM

## 2025-03-26 DIAGNOSIS — M20.11 HALLUX ABDUCTO VALGUS, BILATERAL: ICD-10-CM

## 2025-03-26 DIAGNOSIS — M21.622 TAILOR'S BUNIONETTE, BILATERAL: ICD-10-CM

## 2025-03-26 DIAGNOSIS — M20.12 HALLUX ABDUCTO VALGUS, BILATERAL: ICD-10-CM

## 2025-03-26 DIAGNOSIS — E11.42 DIABETIC POLYNEUROPATHY ASSOCIATED WITH TYPE 2 DIABETES MELLITUS: Primary | ICD-10-CM

## 2025-03-26 PROCEDURE — 1160F RVW MEDS BY RX/DR IN RCRD: CPT | Performed by: PODIATRIST

## 2025-03-26 PROCEDURE — 3046F HEMOGLOBIN A1C LEVEL >9.0%: CPT | Performed by: PODIATRIST

## 2025-03-26 PROCEDURE — G8427 DOCREV CUR MEDS BY ELIG CLIN: HCPCS | Performed by: PODIATRIST

## 2025-03-26 PROCEDURE — 3017F COLORECTAL CA SCREEN DOC REV: CPT | Performed by: PODIATRIST

## 2025-03-26 PROCEDURE — 1123F ACP DISCUSS/DSCN MKR DOCD: CPT | Performed by: PODIATRIST

## 2025-03-26 PROCEDURE — 1125F AMNT PAIN NOTED PAIN PRSNT: CPT | Performed by: PODIATRIST

## 2025-03-26 PROCEDURE — 2022F DILAT RTA XM EVC RTNOPTHY: CPT | Performed by: PODIATRIST

## 2025-03-26 PROCEDURE — 1159F MED LIST DOCD IN RCRD: CPT | Performed by: PODIATRIST

## 2025-03-26 PROCEDURE — 1036F TOBACCO NON-USER: CPT | Performed by: PODIATRIST

## 2025-03-26 PROCEDURE — 99213 OFFICE O/P EST LOW 20 MIN: CPT | Performed by: PODIATRIST

## 2025-03-26 PROCEDURE — 1090F PRES/ABSN URINE INCON ASSESS: CPT | Performed by: PODIATRIST

## 2025-03-26 PROCEDURE — G8399 PT W/DXA RESULTS DOCUMENT: HCPCS | Performed by: PODIATRIST

## 2025-03-26 PROCEDURE — G8417 CALC BMI ABV UP PARAM F/U: HCPCS | Performed by: PODIATRIST

## 2025-03-26 ASSESSMENT — ENCOUNTER SYMPTOMS
VOMITING: 0
NAUSEA: 0
SHORTNESS OF BREATH: 1
BACK PAIN: 1

## 2025-03-26 NOTE — PROGRESS NOTES
Food Insecurity: No Food Insecurity (10/31/2023)    [facility]    Hunger Vital Sign     Worried About Running Out of Food in the Last Year: Never true     Ran Out of Food in the Last Year: Never true   Transportation Needs: No Transportation Needs (10/31/2023)    Received from [facility], [facility], [facility]    PRAPARE - Transportation     Lack of Transportation (Medical): No     Lack of Transportation (Non-Medical): No   Physical Activity: Inactive (10/31/2023)    Received from [facility], [facility], [facility]    Exercise Vital Sign     Days of Exercise per Week: 0 days     Minutes of Exercise per Session: 0 min   Stress: Stress Concern Present (10/31/2023)    Received from [facility], [facility], [facility]    [employer] [place] [employer] - Occupational Stress Questionnaire     Feeling of Stress : Very much   Social Connections: Moderately Integrated (10/31/2023)    Received from [facility], [facility], [facility]    Social Connection and Isolation Panel [NHANES]     Frequency of Communication with Friends and Family: More than three times a week     Frequency of Social Gatherings with Friends and Family: Once a week     Attends Islam Services: Never     Active Member of Clubs or Organizations: Yes     Attends Club or Organization Meetings: More than 4 times per year     Marital Status:    Intimate Partner Violence: Not on file   Housing Stability: Unknown (10/31/2023)    Received from [facility], [facility], [facility]    Housing Stability Vital Sign     Unable to Pay for Housing in the Last Year: No     Number of Places Lived in the Last Year: Not on file     Unstable Housing in the Last Year: No     Family History   Problem Relation Age of Onset    Lymphoma Brother 59        NHL    Breast Cancer Paternal Grandmother 90    Cancer Paternal

## 2025-04-09 ENCOUNTER — HOSPITAL ENCOUNTER (OUTPATIENT)
Dept: WOMENS IMAGING | Age: 70
Discharge: HOME | End: 2025-04-11
Payer: MEDICARE

## 2025-04-09 DIAGNOSIS — Z12.31 BREAST CANCER SCREENING BY MAMMOGRAM: ICD-10-CM

## 2025-04-09 PROCEDURE — 77063 BREAST TOMOSYNTHESIS BI: CPT

## 2025-04-14 ENCOUNTER — RESULTS FOLLOW-UP (OUTPATIENT)
Age: 70
End: 2025-04-14

## 2025-06-16 ENCOUNTER — OFFICE VISIT (OUTPATIENT)
Age: 70
End: 2025-06-16
Payer: MEDICARE

## 2025-06-16 VITALS
HEIGHT: 70 IN | OXYGEN SATURATION: 96 % | TEMPERATURE: 97.1 F | BODY MASS INDEX: 30.18 KG/M2 | HEART RATE: 100 BPM | RESPIRATION RATE: 16 BRPM | WEIGHT: 210.8 LBS | DIASTOLIC BLOOD PRESSURE: 54 MMHG | SYSTOLIC BLOOD PRESSURE: 87 MMHG

## 2025-06-16 DIAGNOSIS — Z12.31 ENCOUNTER FOR SCREENING MAMMOGRAM FOR BREAST CANCER: ICD-10-CM

## 2025-06-16 DIAGNOSIS — N60.11 FIBROCYSTIC BREAST CHANGES, BILATERAL: ICD-10-CM

## 2025-06-16 DIAGNOSIS — N60.12 FIBROCYSTIC BREAST CHANGES, BILATERAL: ICD-10-CM

## 2025-06-16 DIAGNOSIS — Z17.0 CARCINOMA OF UPPER-OUTER QUADRANT OF RIGHT BREAST IN FEMALE, ESTROGEN RECEPTOR POSITIVE (HCC): Primary | ICD-10-CM

## 2025-06-16 DIAGNOSIS — E66.811 OBESITY, CLASS I, BMI 30-34.9: ICD-10-CM

## 2025-06-16 DIAGNOSIS — C50.411 CARCINOMA OF UPPER-OUTER QUADRANT OF RIGHT BREAST IN FEMALE, ESTROGEN RECEPTOR POSITIVE (HCC): Primary | ICD-10-CM

## 2025-06-16 PROCEDURE — 99212 OFFICE O/P EST SF 10 MIN: CPT | Performed by: SURGERY

## 2025-06-16 PROCEDURE — 1123F ACP DISCUSS/DSCN MKR DOCD: CPT | Performed by: SURGERY

## 2025-06-16 PROCEDURE — 1126F AMNT PAIN NOTED NONE PRSNT: CPT | Performed by: SURGERY

## 2025-06-16 PROCEDURE — 1090F PRES/ABSN URINE INCON ASSESS: CPT | Performed by: SURGERY

## 2025-06-16 PROCEDURE — G8399 PT W/DXA RESULTS DOCUMENT: HCPCS | Performed by: SURGERY

## 2025-06-16 PROCEDURE — 3078F DIAST BP <80 MM HG: CPT | Performed by: SURGERY

## 2025-06-16 PROCEDURE — 99213 OFFICE O/P EST LOW 20 MIN: CPT | Performed by: SURGERY

## 2025-06-16 PROCEDURE — G8417 CALC BMI ABV UP PARAM F/U: HCPCS | Performed by: SURGERY

## 2025-06-16 PROCEDURE — 1036F TOBACCO NON-USER: CPT | Performed by: SURGERY

## 2025-06-16 PROCEDURE — 3017F COLORECTAL CA SCREEN DOC REV: CPT | Performed by: SURGERY

## 2025-06-16 PROCEDURE — 1159F MED LIST DOCD IN RCRD: CPT | Performed by: SURGERY

## 2025-06-16 PROCEDURE — 3074F SYST BP LT 130 MM HG: CPT | Performed by: SURGERY

## 2025-06-16 PROCEDURE — G8427 DOCREV CUR MEDS BY ELIG CLIN: HCPCS | Performed by: SURGERY

## 2025-06-16 RX ORDER — LOSARTAN POTASSIUM 25 MG/1
25 TABLET ORAL 2 TIMES DAILY
COMMUNITY
Start: 2025-04-21

## 2025-06-16 RX ORDER — SEMAGLUTIDE 2.68 MG/ML
2 INJECTION, SOLUTION SUBCUTANEOUS
COMMUNITY
Start: 2025-03-19

## 2025-06-16 ASSESSMENT — ENCOUNTER SYMPTOMS
SHORTNESS OF BREATH: 0
NAUSEA: 0
ABDOMINAL PAIN: 0
SORE THROAT: 0
CHEST TIGHTNESS: 0
COUGH: 0
COLOR CHANGE: 0
VOMITING: 0

## 2025-06-16 NOTE — PROGRESS NOTES
PATIENT:    Orquidea Cleary     DATE:      6/16/2025     TIME: 2:00 PM     Subjective:     Orquidea Cleary presents for follow-up of breast cancer.  She is well with no complaints    The breast cancer is Cancer Staging   Breast neoplasm, Tis (DCIS), left  Staging form: Breast, AJCC 8th Edition  - Clinical stage from 3/5/2018: Stage 0 (cTis (DCIS), cN0, cM0, ER+) - Signed by Elizabeth Roque MD on 12/2/2020    Carcinoma of upper-outer quadrant of left breast in female, estrogen receptor positive (HCC)  Staging form: Breast, AJCC 8th Edition  - Clinical stage from 12/14/2020: Stage IA (rcT1, cN0, cM0, G2, ER+, KY+, HER2-) - Signed by Elizabeth Roque MD on 1/4/2021       She does perform self breast exams.  She denies breast pain, masses, skin changes, nipple discharge, nipple retraction, or recent breast trauma bilaterally.      Patient's medications, allergies, past medical, surgical, social and family histories were reviewed and updated as appropriate.    Current Outpatient Medications on File Prior to Visit   Medication Sig Dispense Refill    losartan (COZAAR) 25 MG tablet Take 1 tablet by mouth 2 times daily      OZEMPIC, 2 MG/DOSE, 8 MG/3ML SOPN sc injection Inject 2 mg into the skin every 7 days      dapagliflozin (FARXIGA) 5 MG tablet Take 1 tablet by mouth daily (with breakfast)      lidocaine (LIDODERM) 5 % Place 1 patch onto the skin daily      potassium citrate (UROCIT-K) 10 MEQ (1080 MG) extended release tablet Take 1 tablet by mouth 2 times daily (with meals) with meals      HUMALOG KWIKPEN 100 UNIT/ML SOPN Inject 20 Units into the skin 3 times daily (with meals)      topiramate (TOPAMAX) 25 MG tablet Take 1 tablet by mouth nightly      SUMAtriptan (IMITREX) 50 MG tablet Take 1 tablet by mouth as needed      ondansetron (ZOFRAN) 8 MG tablet Take 1 tablet by mouth every 8 hours as needed      Insulin Degludec (TRESIBA FLEXTOUCH) 200 UNIT/ML SOPN Inject 100 Units into the skin every morning      magnesium oxide

## 2025-06-16 NOTE — PROGRESS NOTES
Reason for today's visit:  6 month follow up left breast cancer    Palpates a breast change? no  Nipple discharge: no  Breast Pain: yes - with pressure, when cat walks on her  left breast  left axilla intermittent pain with pressure  Breast Mass: no  Swelling in either upper extremity? no    Wellness:    Self breast self exams: yes  Regular exercise routine: no  Following Lymphedema awareness/precautions: yes   Managing stress:yes   Stress level on a scale of 1 - 10: 5    Instruction:    Follow up per provider  Imaging per provider  Monthly self breast exams  Healthy diet  Exercise program  Lymphedema precautions/awareness    Other:    Requisitions needed:no  Bras/prosthesis: no  Compression Sleeve/glove: no  Lymphedema Measurements: see flow sheet  Therapy: no  PT/OT/Lymphedema: no  Follow up as advised per Dr Roque            6/16/2025     1:23 PM 6/10/2024     3:08 PM 3/20/2023     3:26 PM 9/12/2022    11:57 AM 3/7/2022    10:14 AM 8/30/2021    11:09 AM   Lymphedema Screening   RIGHT 10 cm (Hand)      19.2   RIGHT 20 cm (Wrist)      19   RIGHT 30 cm (Forearm)  28    24.5   RIGHT 60 cm (Upper Arm)  38    34   LEFT 10 cm (Hand) 19 19 19.5 19 19.5    LEFT 20 cm (Wrist) 18 19  18.5 18.5    LEFT 30 cm (Forearm) 17.5 28 26.75 22 21.5    LEFT 60 cm (Upper Arm) 35 39 42 32 33.5

## (undated) DEVICE — PLASMABLADE PS210-030S 3.0S LOCK: Brand: PLASMABLADE™

## (undated) DEVICE — COUNTER NDL 40 COUNT HLD 70 FOAM BLK ADH W/ MAG

## (undated) DEVICE — TOWEL,OR,DSP,ST,BLUE,STD,4/PK,20PK/CS: Brand: MEDLINE

## (undated) DEVICE — ELECTRODE PT RET AD L9FT HI MOIST COND ADH HYDRGEL CORDED

## (undated) DEVICE — SHEET,DRAPE,53X77,STERILE: Brand: MEDLINE

## (undated) DEVICE — SPONGE,LAP,18"X18",DLX,XR,ST,5/PK,40/PK: Brand: MEDLINE

## (undated) DEVICE — SUTURE VCRL SZ 3-0 L27IN ABSRB UD L26MM SH 1/2 CIR J416H

## (undated) DEVICE — SUTURE MCRYL SZ 4-0 L27IN ABSRB UD L19MM PS-2 1/2 CIR PRIM Y426H

## (undated) DEVICE — APPLICATOR MEDICATED 26 CC SOLUTION HI LT ORNG CHLORAPREP

## (undated) DEVICE — Z INACTIVE USE 2735373 APPLICATOR FBR LAIN COT WOOD TIP ECONOMICAL

## (undated) DEVICE — PACK,LAPAROTOMY,NO GOWNS: Brand: MEDLINE

## (undated) DEVICE — SUTURE PERMAHAND SZ 3-0 L30IN NONABSORBABLE BLK SH L26MM K832H

## (undated) DEVICE — TUBING, SUCTION, 1/4" X 10', STRAIGHT: Brand: MEDLINE

## (undated) DEVICE — 4-PORT MANIFOLD: Brand: NEPTUNE 2

## (undated) DEVICE — COVER US PRB W15XL244CM SURGICAL INTRAOPERATIVE W RUBBERBAND

## (undated) DEVICE — YANKAUER,BULB TIP,W/O VENT,RIGID,STERILE: Brand: MEDLINE

## (undated) DEVICE — GAUZE,SPONGE,FLUFF,6"X6.75",STRL,10/TRAY: Brand: MEDLINE

## (undated) DEVICE — LABEL MED MINI W/ MARKER

## (undated) DEVICE — GEL ULTRASOUND 20 GM STRL

## (undated) DEVICE — GLOVE SURG SZ 7 L12IN FNGR THK94MIL TRNSLUC YEL LTX HYDRGEL

## (undated) DEVICE — FILTER NEEDLE: Brand: MONOJECT

## (undated) DEVICE — SYRINGE MED 10ML LUERLOCK TIP W/O SFTY DISP

## (undated) DEVICE — GOWN,AURORA,NONREINFORCED,LARGE: Brand: MEDLINE

## (undated) DEVICE — GOWN,AURORA,NONRNF,XL,30/CS: Brand: MEDLINE

## (undated) DEVICE — BANDAGE COMPR M W6INXL10YD WHT BGE VELC E MTRX HK AND LOOP

## (undated) DEVICE — SYRINGE IRRIG 60ML SFT PLIABLE BLB EZ TO GRP 1 HND USE W/

## (undated) DEVICE — PAD,NON-ADHERENT,3X8,STERILE,LF,1/PK: Brand: MEDLINE

## (undated) DEVICE — COVER US PRB W4XL15IN GAM CRDLSS FLD

## (undated) DEVICE — MARKER SURG SKIN GENTIAN VLT REG TIP W/ 6IN RUL